# Patient Record
Sex: MALE | Race: WHITE | HISPANIC OR LATINO | Employment: FULL TIME | URBAN - METROPOLITAN AREA
[De-identification: names, ages, dates, MRNs, and addresses within clinical notes are randomized per-mention and may not be internally consistent; named-entity substitution may affect disease eponyms.]

---

## 2017-05-16 ENCOUNTER — HOSPITAL ENCOUNTER (OUTPATIENT)
Dept: LAB | Facility: MEDICAL CENTER | Age: 27
End: 2017-05-16
Attending: PHYSICIAN ASSISTANT
Payer: COMMERCIAL

## 2017-05-16 ENCOUNTER — NON-PROVIDER VISIT (OUTPATIENT)
Dept: CARDIOLOGY | Facility: MEDICAL CENTER | Age: 27
End: 2017-05-16
Payer: COMMERCIAL

## 2017-05-16 ENCOUNTER — OFFICE VISIT (OUTPATIENT)
Dept: MEDICAL GROUP | Facility: MEDICAL CENTER | Age: 27
End: 2017-05-16
Payer: COMMERCIAL

## 2017-05-16 VITALS
TEMPERATURE: 97.9 F | BODY MASS INDEX: 28.08 KG/M2 | SYSTOLIC BLOOD PRESSURE: 156 MMHG | WEIGHT: 189.6 LBS | DIASTOLIC BLOOD PRESSURE: 98 MMHG | HEART RATE: 78 BPM | RESPIRATION RATE: 16 BRPM | OXYGEN SATURATION: 98 % | HEIGHT: 69 IN

## 2017-05-16 DIAGNOSIS — R00.1 SINUS BRADYCARDIA: ICD-10-CM

## 2017-05-16 DIAGNOSIS — I10 ESSENTIAL HYPERTENSION: ICD-10-CM

## 2017-05-16 DIAGNOSIS — I49.3 PVC (PREMATURE VENTRICULAR CONTRACTION): ICD-10-CM

## 2017-05-16 DIAGNOSIS — Z13.6 SCREENING FOR CARDIOVASCULAR CONDITION: ICD-10-CM

## 2017-05-16 DIAGNOSIS — R00.2 PALPITATIONS: ICD-10-CM

## 2017-05-16 DIAGNOSIS — R00.0 SINUS TACHYCARDIA: ICD-10-CM

## 2017-05-16 DIAGNOSIS — F41.9 ANXIETY: Chronic | ICD-10-CM

## 2017-05-16 DIAGNOSIS — R00.2 PALPITATIONS: Primary | ICD-10-CM

## 2017-05-16 DIAGNOSIS — I49.1 PREMATURE ATRIAL COMPLEX: ICD-10-CM

## 2017-05-16 LAB
ALBUMIN SERPL BCP-MCNC: 4.8 G/DL (ref 3.2–4.9)
ALBUMIN/GLOB SERPL: 1.3 G/DL
ALP SERPL-CCNC: 85 U/L (ref 30–99)
ALT SERPL-CCNC: 53 U/L (ref 2–50)
AMORPH CRY #/AREA URNS HPF: PRESENT /HPF
ANION GAP SERPL CALC-SCNC: 8 MMOL/L (ref 0–11.9)
APPEARANCE UR: ABNORMAL
AST SERPL-CCNC: 28 U/L (ref 12–45)
BACTERIA #/AREA URNS HPF: ABNORMAL /HPF
BILIRUB SERPL-MCNC: 0.8 MG/DL (ref 0.1–1.5)
BILIRUB UR QL STRIP.AUTO: NEGATIVE
BUN SERPL-MCNC: 14 MG/DL (ref 8–22)
CALCIUM SERPL-MCNC: 10 MG/DL (ref 8.5–10.5)
CHLORIDE SERPL-SCNC: 101 MMOL/L (ref 96–112)
CHOLEST SERPL-MCNC: 229 MG/DL (ref 100–199)
CO2 SERPL-SCNC: 26 MMOL/L (ref 20–33)
COLOR UR: YELLOW
CREAT SERPL-MCNC: 0.88 MG/DL (ref 0.5–1.4)
CULTURE IF INDICATED INDCX: YES UA CULTURE
GFR SERPL CREATININE-BSD FRML MDRD: >60 ML/MIN/1.73 M 2
GLOBULIN SER CALC-MCNC: 3.7 G/DL (ref 1.9–3.5)
GLUCOSE SERPL-MCNC: 88 MG/DL (ref 65–99)
GLUCOSE UR STRIP.AUTO-MCNC: NEGATIVE MG/DL
HDLC SERPL-MCNC: 42 MG/DL
KETONES UR STRIP.AUTO-MCNC: NEGATIVE MG/DL
LDLC SERPL CALC-MCNC: ABNORMAL MG/DL
LEUKOCYTE ESTERASE UR QL STRIP.AUTO: NEGATIVE
MICRO URNS: ABNORMAL
MUCOUS THREADS #/AREA URNS HPF: ABNORMAL /HPF
NITRITE UR QL STRIP.AUTO: NEGATIVE
PH UR STRIP.AUTO: 6 [PH]
POTASSIUM SERPL-SCNC: 4 MMOL/L (ref 3.6–5.5)
PROT SERPL-MCNC: 8.5 G/DL (ref 6–8.2)
PROT UR QL STRIP: NEGATIVE MG/DL
RBC # URNS HPF: ABNORMAL /HPF
RBC UR QL AUTO: NEGATIVE
SODIUM SERPL-SCNC: 135 MMOL/L (ref 135–145)
SP GR UR STRIP.AUTO: 1.02
TRIGL SERPL-MCNC: 410 MG/DL (ref 0–149)
TSH SERPL DL<=0.005 MIU/L-ACNC: 2.29 UIU/ML (ref 0.3–3.7)

## 2017-05-16 PROCEDURE — 93000 ELECTROCARDIOGRAM COMPLETE: CPT | Performed by: PHYSICIAN ASSISTANT

## 2017-05-16 PROCEDURE — 99214 OFFICE O/P EST MOD 30 MIN: CPT | Performed by: PHYSICIAN ASSISTANT

## 2017-05-16 PROCEDURE — 84443 ASSAY THYROID STIM HORMONE: CPT

## 2017-05-16 PROCEDURE — 80061 LIPID PANEL: CPT

## 2017-05-16 PROCEDURE — 80053 COMPREHEN METABOLIC PANEL: CPT

## 2017-05-16 PROCEDURE — 36415 COLL VENOUS BLD VENIPUNCTURE: CPT

## 2017-05-16 PROCEDURE — 87086 URINE CULTURE/COLONY COUNT: CPT

## 2017-05-16 PROCEDURE — 81001 URINALYSIS AUTO W/SCOPE: CPT

## 2017-05-16 ASSESSMENT — PATIENT HEALTH QUESTIONNAIRE - PHQ9: CLINICAL INTERPRETATION OF PHQ2 SCORE: 0

## 2017-05-16 NOTE — ASSESSMENT & PLAN NOTE
Chronic in nature. Patient has been told over the years positive hypertension elevated blood pressure. Currently asymptomatic. Patient admits that he is not taking the meds as he was supposed to.

## 2017-05-16 NOTE — PROGRESS NOTES
Chief Complaint   Patient presents with   • New Patient       HISTORY OF PRESENT [id number]: Patient is a 26 y.o. male established patient who presents today to establish care and discuss palpitation    Palpitations  Chronic in nature. Patient states that he's had episodes over the years. Patient states that he recently had an episode of palpitations patient states that 2 days ago while trying to go to sleep patient states he exhibited a symptom and a run of palpitations. Patient states it lasted approximately 30 seconds to one minute in duration. Patient states that he is very worried about this. Patient states that he did call 911. EMS did arrive on scene. Patient states he was feeling better and therefore declined transport to the emergency room. Patient presents to clinic today for further evaluation. Patient has had issues in the past. He was given propanolol 80 mg once a day but admits he has not taken that medication currently. Patient states that he did have one episode of vomiting during this occurrence as well as sensation of passing out but admits no loss of consciousness.    Essential hypertension  Chronic in nature. Patient has been told over the years positive hypertension elevated blood pressure. Currently asymptomatic. Patient admits that he is not taking the meds as he was supposed to.    Anxiety  Chronic in nature. Patient has been told in the past positive anxiety. Patient feels that he is on edge on the time. He has been given meds in the past for this, but admits noncompliance       Patient Active Problem List    Diagnosis Date Noted   • Inappropriate sinus tachycardia (HCC) 08/08/2014     Priority: Medium   • Anxiety 08/08/2014     Priority: Low   • Palpitations 05/16/2017   • Diffuse abdominal pain 08/10/2016   • Diarrhea 08/10/2016   • Essential hypertension 08/05/2016   • Dyspepsia 08/05/2016       Allergies:Review of patient's allergies indicates no known allergies.    Current Outpatient  "Prescriptions   Medication Sig Dispense Refill   • omeprazole (PRILOSEC) 20 MG delayed-release capsule Take 1 Cap by mouth every day. 30 Cap 2     No current facility-administered medications for this visit.       Social History   Substance Use Topics   • Smoking status: Never Smoker    • Smokeless tobacco: Never Used   • Alcohol Use: 6.0 oz/week     10 Cans of beer per week       Family Status   Relation Status Death Age   • Mother Alive    • Father Alive      Family History   Problem Relation Age of Onset   • Hypertension Mother        Review of Systems:   Constitutional: Negative for fever, chills, weight loss and malaise/fatigue.   HENT: Negative for ear pain, nosebleeds, congestion, sore throat and neck pain.    Eyes: Negative for blurred vision.   Respiratory: Negative for cough, sputum production, shortness of breath and wheezing.    Cardiovascular: Negative for chest pain. Positive palpitations. Negative orthopnea and leg swelling.   Gastrointestinal: Negative for heartburn, nausea, vomiting and abdominal pain.   Genitourinary: Negative for dysuria, urgency and frequency.   Musculoskeletal: Negative for myalgias, back pain and joint pain.   Skin: Negative for rash and itching.   Neurological: Negative for dizziness, tingling, tremors, sensory change, focal weakness and headaches.   Endo/Heme/Allergies: Does not bruise/bleed easily.   Psychiatric/Behavioral: Negative for depression, suicidal ideas and memory loss.  The patient is nervous/anxious and does not have insomnia.    All other systems reviewed and are negative except as in HPI.    Exam:  Blood pressure 156/98, pulse 78, temperature 36.6 °C (97.9 °F), resp. rate 16, height 1.753 m (5' 9.02\"), weight 86 kg (189 lb 9.5 oz), SpO2 98 %.  General:  Well nourished, well developed male in NAD  Head: is grossly normal.  Neck: Supple without JVD or bruit. Thyroid is not enlarged.  Pulmonary: Clear to ausculation. Normal effort. No rales, ronchi, or " wheezing.  Cardiovascular: Regular rate and rhythm without murmur. Carotid and radial pulses are intact and equal bilaterally.  Extremities: no clubbing, cyanosis, or edema.  Psychiatric/behavioral: Positive anxiety. No signs of homicidal or suicidal ideations. No auditory, visual or tactile disease.    EKG Interpretation   Interpreted by me   Rhythm: normal sinus   Rate: normal 82 bpm  Conduction: normal   ST Segments: no acute change   T Waves: no acute change   Q Waves: none   Clinical Impression: no acute changes and normal EKG    Medical decision-making and discussion: 26-year-old male presents to clinic to establish care with us his primary care. Patient states positive history of palpitations with having a pretty severe episode recently. Did not go to ER. Labs haven't ordered to check overall status including thyroid, etc. EKG performed in office today please see above. Holter monitor also ordered to check long-term if patient is having any runs of cardiac abnormalities that may be causing patient's palpitation-like symptoms. Also discussed with patient possibility of anxiety as the underlying cause. Discussed also the patient does have hypertension. I have evaluated October 16, 2015, August 5, 2016 and today May 16, 2017 office visits in which blood pressure is 142/76, 178/110 and 156/98 mmHg respectively. Labs have been ordered to evaluate electrolyte imbalances, thyroid, etc. Reevaluation 2 weeks in which we will go over labs, and initiate hypertensive medication for patient. Also discussed possibility of starting anxiety medication which may secondarily decrease patient's high blood pressure and may decrease patient's symptomology of palpitations once therapeutic.    Please note that this dictation was created using voice recognition software. I have made every reasonable attempt to correct obvious errors, but I expect that there are errors of grammar and possibly content that I did not discover before  finalizing the note.    Assessment/Plan:  1. Palpitations  EKG    HOLTER MONITOR STUDY   2. Essential hypertension  COMP METABOLIC PANEL    TSH    URINALYSIS,CULTURE IF INDICATED    HOLTER MONITOR STUDY    Reevaluation 2 weeks, and medication to be written   3. Anxiety  COMP METABOLIC PANEL    TSH    URINALYSIS,CULTURE IF INDICATED   4. Screening for cardiovascular condition  LIPID PROFILE

## 2017-05-16 NOTE — ASSESSMENT & PLAN NOTE
Chronic in nature. Patient states that he's had episodes over the years. Patient states that he recently had an episode of palpitations patient states that 2 days ago while trying to go to sleep patient states he exhibited a symptom and a run of palpitations. Patient states it lasted approximately 30 seconds to one minute in duration. Patient states that he is very worried about this. Patient states that he did call 911. EMS did arrive on scene. Patient states he was feeling better and therefore declined transport to the emergency room. Patient presents to clinic today for further evaluation. Patient has had issues in the past. He was given propanolol 80 mg once a day but admits he has not taken that medication currently. Patient states that he did have one episode of vomiting during this occurrence as well as sensation of passing out but admits no loss of consciousness.

## 2017-05-16 NOTE — ASSESSMENT & PLAN NOTE
Chronic in nature. Patient has been told in the past positive anxiety. Patient feels that he is on edge on the time. He has been given meds in the past for this, but admits noncompliance

## 2017-05-18 ENCOUNTER — TELEPHONE (OUTPATIENT)
Dept: MEDICAL GROUP | Facility: MEDICAL CENTER | Age: 27
End: 2017-05-18

## 2017-05-18 LAB
BACTERIA UR CULT: NORMAL
SIGNIFICANT IND 70042: NORMAL
SOURCE SOURCE: NORMAL

## 2017-05-18 NOTE — TELEPHONE ENCOUNTER
Phone Number Called: [phone] (home)     Message: Left msg for patient that he needs to make f/v to discuss labs.    Left Message for patient to call back: N\A

## 2017-05-18 NOTE — TELEPHONE ENCOUNTER
----- Message from Genaro Fernández PA-C sent at 5/18/2017 11:15 AM PDT -----  Review of labs: Please have patient schedule follow-up to discuss

## 2017-05-19 NOTE — TELEPHONE ENCOUNTER
Phone Number Called: [phone] (home)     Message: left msg for pt to call back.     Left Message for patient to call back: N\A

## 2017-05-22 DIAGNOSIS — R00.2 PALPITATIONS: ICD-10-CM

## 2017-05-23 ENCOUNTER — OFFICE VISIT (OUTPATIENT)
Dept: MEDICAL GROUP | Facility: MEDICAL CENTER | Age: 27
End: 2017-05-23
Payer: COMMERCIAL

## 2017-05-23 VITALS
WEIGHT: 189 LBS | TEMPERATURE: 98.2 F | HEART RATE: 85 BPM | BODY MASS INDEX: 27.99 KG/M2 | HEIGHT: 69 IN | DIASTOLIC BLOOD PRESSURE: 90 MMHG | OXYGEN SATURATION: 96 % | RESPIRATION RATE: 16 BRPM | SYSTOLIC BLOOD PRESSURE: 144 MMHG

## 2017-05-23 DIAGNOSIS — E78.3 HYPERCHYLOMICRONEMIA: ICD-10-CM

## 2017-05-23 PROCEDURE — 99214 OFFICE O/P EST MOD 30 MIN: CPT | Performed by: PHYSICIAN ASSISTANT

## 2017-05-23 RX ORDER — FENOFIBRATE 145 MG/1
145 TABLET, COATED ORAL DAILY
Qty: 90 TAB | Refills: 1 | Status: SHIPPED | OUTPATIENT
Start: 2017-05-23 | End: 2017-06-12

## 2017-05-23 NOTE — PATIENT INSTRUCTIONS
"Food Choices to Lower Your Triglycerides  Triglycerides are a type of fat in your blood. High levels of triglycerides can increase the risk of heart disease and stroke. If your triglyceride levels are high, the foods you eat and your eating habits are very important. Choosing the right foods can help lower your triglycerides.   WHAT GENERAL GUIDELINES DO I NEED TO FOLLOW?  · Lose weight if you are overweight.    · Limit or avoid alcohol.    · Fill one half of your plate with vegetables and green salads.    · Limit fruit to two servings a day. Choose fruit instead of juice.    · Make one fourth of your plate whole grains. Look for the word \"whole\" as the first word in the ingredient list.  · Fill one fourth of your plate with lean protein foods.  · Enjoy fatty fish (such as salmon, mackerel, sardines, and tuna) three times a week.    · Choose healthy fats.    · Limit foods high in starch and sugar.  · Eat more home-cooked food and less restaurant, buffet, and fast food.  · Limit fried foods.  · Cook foods using methods other than frying.  · Limit saturated fats.  · Check ingredient lists to avoid foods with partially hydrogenated oils (trans fats) in them.  WHAT FOODS CAN I EAT?   Grains  Whole grains, such as whole wheat or whole grain breads, crackers, cereals, and pasta. Unsweetened oatmeal, bulgur, barley, quinoa, or brown rice. Corn or whole wheat flour tortillas.   Vegetables  Fresh or frozen vegetables (raw, steamed, roasted, or grilled). Green salads.  Fruits  All fresh, canned (in natural juice), or frozen fruits.  Meat and Other Protein Products  Ground beef (85% or leaner), grass-fed beef, or beef trimmed of fat. Skinless chicken or turkey. Ground chicken or turkey. Pork trimmed of fat. All fish and seafood. Eggs. Dried beans, peas, or lentils. Unsalted nuts or seeds. Unsalted canned or dry beans.  Dairy  Low-fat dairy products, such as skim or 1% milk, 2% or reduced-fat cheeses, low-fat ricotta or cottage " cheese, or plain low-fat yogurt.  Fats and Oils  Tub margarines without trans fats. Light or reduced-fat mayonnaise and salad dressings. Avocado. Safflower, olive, or canola oils. Natural peanut or almond butter.  The items listed above may not be a complete list of recommended foods or beverages. Contact your dietitian for more options.  WHAT FOODS ARE NOT RECOMMENDED?   Grains  White bread. White pasta. White rice. Cornbread. Bagels, pastries, and croissants. Crackers that contain trans fat.  Vegetables  White potatoes. Corn. Creamed or fried vegetables. Vegetables in a cheese sauce.  Fruits  Dried fruits. Canned fruit in light or heavy syrup. Fruit juice.  Meat and Other Protein Products  Fatty cuts of meat. Ribs, chicken wings, agosto, sausage, bologna, salami, chitterlings, fatback, hot dogs, bratwurst, and packaged luncheon meats.  Dairy  Whole or 2% milk, cream, half-and-half, and cream cheese. Whole-fat or sweetened yogurt. Full-fat cheeses. Nondairy creamers and whipped toppings. Processed cheese, cheese spreads, or cheese curds.  Sweets and Desserts  Corn syrup, sugars, honey, and molasses. Candy. Jam and jelly. Syrup. Sweetened cereals. Cookies, pies, cakes, donuts, muffins, and ice cream.  Fats and Oils  Butter, stick margarine, lard, shortening, ghee, or agosto fat. Coconut, palm kernel, or palm oils.  Beverages  Alcohol. Sweetened drinks (such as sodas, lemonade, and fruit drinks or punches).  The items listed above may not be a complete list of foods and beverages to avoid. Contact your dietitian for more information.     This information is not intended to replace advice given to you by your health care provider. Make sure you discuss any questions you have with your health care provider.     Document Released: 10/05/2005 Document Revised: 01/08/2016 Document Reviewed: 10/22/2014  [employer] Interactive Patient Education ©2016 [employer]

## 2017-05-23 NOTE — MR AVS SNAPSHOT
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]         Vital Signs     Blood Pressure Pulse Temperature Respirations Height Weight    144/90 mmHg 85 36.8 °C (98.2 °F) 16 1.753 m (5' 9.02\") 85.73 kg (189 lb)    Body Mass Index Oxygen Saturation Smoking Status             27.90 kg/m2 96% Never Smoker          Basic Information     Date Of Birth Sex Race Ethnicity Preferred Language    1990 Male White  Origin (Burmese,Portuguese,Cymraes,Malian, etc) English      Problem List              ICD-10-CM Priority Class Noted - Resolved    Inappropriate sinus tachycardia (HCC) (Chronic) R00.0 Medium  2014 - Present    Anxiety (Chronic) F41.9 Low  2014 - Present    Essential hypertension I10   2016 - Present    Dyspepsia R10.13   2016 - Present    Diffuse abdominal pain R10.84   8/10/2016 - Present    Diarrhea R19.7   8/10/2016 - Present    Palpitations R00.2   2017 - Present      Health Maintenance        Date Due Completion Dates    IMM DTaP/Tdap/Td Vaccine (5 - Tdap) 2004 2/3/2004, 1996, 1992, 1992, 1992    IMM HPV VACCINE (2 of 3 - Male 3 Dose Series) 2015 3/30/2015 (Declined)    Override on 3/30/2015: Patient Declined            Current Immunizations     Dtap Vaccine 1996, 1992, 1992, 1992    Hepatitis A Vaccine, Ped/Adol 2/3/2004, 2003    Hepatitis B Vaccine Non-Recombivax (Ped/Adol) 2003, 2003, 2002    TD Vaccine 2/3/2004    Varicella Vaccine Live 2007, 2003      Below and/or attached are the medications your provider expects you to take. Review all of your home medications and " newly ordered medications with your provider and/or pharmacist. Follow medication instructions as directed by your provider and/or pharmacist. Please keep your medication list with you and share with your provider. Update the information when medications are discontinued, doses are changed, or new medications (including over-the-counter products) are added; and carry medication information at all times in the event of emergency situations     Allergies:  No Known Allergies          Medications  Valid as of: May 23, 2017 -  9:55 AM    Generic Name Brand Name Tablet Size Instructions for use    Fenofibrate (Tab) TRICOR 145 MG Take 1 Tab by mouth every day.        Omeprazole (CAPSULE DELAYED RELEASE) PRILOSEC 20 MG Take 1 Cap by mouth every day.        .                 Medicines prescribed today were sent to:     [id number] - [place], [place] - [address]    [facility] [place] [address]    Phone: [phone] Fax: [phone]    Open 24 Hours?: No      Medication refill instructions:       If your prescription bottle indicates you have medication refills left, it is not necessary to call your provider’s office. Please contact your pharmacy and they will refill your medication.    If your prescription bottle indicates you do not have any refills left, you may request refills at any time through one of the following ways: The online [employer] system (except Urgent Care), by calling your provider’s office, or by asking your pharmacy to contact your provider’s office with a refill request. Medication refills are processed only during regular business hours and may not be available until the next business day. Your provider may request additional information or to have a follow-up visit with you prior to refilling your medication.   *Please Note: Medication refills are assigned a new Rx number when refilled electronically. Your pharmacy may indicate that no refills were authorized even though a new prescription for the  "same medication is available at the pharmacy. Please request the medicine by name with the pharmacy before contacting your provider for a refill.        Instructions    Food Choices to Lower Your Triglycerides  Triglycerides are a type of fat in your blood. High levels of triglycerides can increase the risk of heart disease and stroke. If your triglyceride levels are high, the foods you eat and your eating habits are very important. Choosing the right foods can help lower your triglycerides.   WHAT GENERAL GUIDELINES DO I NEED TO FOLLOW?  · Lose weight if you are overweight.    · Limit or avoid alcohol.    · Fill one half of your plate with vegetables and green salads.    · Limit fruit to two servings a day. Choose fruit instead of juice.    · Make one fourth of your plate whole grains. Look for the word \"whole\" as the first word in the ingredient list.  · Fill one fourth of your plate with lean protein foods.  · Enjoy fatty fish (such as salmon, mackerel, sardines, and tuna) three times a week.    · Choose healthy fats.    · Limit foods high in starch and sugar.  · Eat more home-cooked food and less restaurant, buffet, and fast food.  · Limit fried foods.  · Cook foods using methods other than frying.  · Limit saturated fats.  · Check ingredient lists to avoid foods with partially hydrogenated oils (trans fats) in them.  WHAT FOODS CAN I EAT?   Grains  Whole grains, such as whole wheat or whole grain breads, crackers, cereals, and pasta. Unsweetened oatmeal, bulgur, barley, quinoa, or brown rice. Corn or whole wheat flour tortillas.   Vegetables  Fresh or frozen vegetables (raw, steamed, roasted, or grilled). Green salads.  Fruits  All fresh, canned (in natural juice), or frozen fruits.  Meat and Other Protein Products  Ground beef (85% or leaner), grass-fed beef, or beef trimmed of fat. Skinless chicken or turkey. Ground chicken or turkey. Pork trimmed of fat. All fish and seafood. Eggs. Dried beans, peas, or " lentils. Unsalted nuts or seeds. Unsalted canned or dry beans.  Dairy  Low-fat dairy products, such as skim or 1% milk, 2% or reduced-fat cheeses, low-fat ricotta or cottage cheese, or plain low-fat yogurt.  Fats and Oils  Tub margarines without trans fats. Light or reduced-fat mayonnaise and salad dressings. Avocado. Safflower, olive, or canola oils. Natural peanut or almond butter.  The items listed above may not be a complete list of recommended foods or beverages. Contact your dietitian for more options.  WHAT FOODS ARE NOT RECOMMENDED?   Grains  White bread. White pasta. White rice. Cornbread. Bagels, pastries, and croissants. Crackers that contain trans fat.  Vegetables  White potatoes. Corn. Creamed or fried vegetables. Vegetables in a cheese sauce.  Fruits  Dried fruits. Canned fruit in light or heavy syrup. Fruit juice.  Meat and Other Protein Products  Fatty cuts of meat. Ribs, chicken wings, agosto, sausage, bologna, salami, chitterlings, fatback, hot dogs, bratwurst, and packaged luncheon meats.  Dairy  Whole or 2% milk, cream, half-and-half, and cream cheese. Whole-fat or sweetened yogurt. Full-fat cheeses. Nondairy creamers and whipped toppings. Processed cheese, cheese spreads, or cheese curds.  Sweets and Desserts  Corn syrup, sugars, honey, and molasses. Candy. Jam and jelly. Syrup. Sweetened cereals. Cookies, pies, cakes, donuts, muffins, and ice cream.  Fats and Oils  Butter, stick margarine, lard, shortening, ghee, or agosto fat. Coconut, palm kernel, or palm oils.  Beverages  Alcohol. Sweetened drinks (such as sodas, lemonade, and fruit drinks or punches).  The items listed above may not be a complete list of foods and beverages to avoid. Contact your dietitian for more information.     This information is not intended to replace advice given to you by your health care provider. Make sure you discuss any questions you have with your health care provider.     Document Released: 10/05/2005 Document  Revised: 01/08/2016 Document Reviewed: 10/22/2014  [employer] Interactive Patient Education ©2016 [employer]            MyChart Access Code: Activation code not generated  Current MyChart Status: Active

## 2017-05-23 NOTE — PROGRESS NOTES
Chief Complaint   Patient presents with   • Follow-Up       HISTORY OF PRESENT ILLNESS: Patient is a 26 y.o. male established patient who presents today to follow up.    Patient presented to clinic as a new patient on May 16, 2017. Labs were ordered and performed on that same day. Patient presents to clinic today to follow up on those labs. Patient is orally reviewed labs through MyChart is aware of the abnormalities. In discussion with patient he does state that diet could be better, lifestyle could be better. Patient states there are a lot of cultural barriers that prevent patient from eating that healthiest diet.    No problem-specific assessment & plan notes found for this encounter.     Patient Active Problem List    Diagnosis Date Noted   • Inappropriate sinus tachycardia (HCC) 08/08/2014     Priority: Medium   • Anxiety 08/08/2014     Priority: Low   • Palpitations 05/16/2017   • Diffuse abdominal pain 08/10/2016   • Diarrhea 08/10/2016   • Essential hypertension 08/05/2016   • Dyspepsia 08/05/2016     Allergies:Review of patient's allergies indicates no known allergies.    Current Outpatient Prescriptions   Medication Sig Dispense Refill   • omeprazole (PRILOSEC) 20 MG delayed-release capsule Take 1 Cap by mouth every day. 30 Cap 2     No current facility-administered medications for this visit.       Social History   Substance Use Topics   • Smoking status: Never Smoker    • Smokeless tobacco: Never Used   • Alcohol Use: 6.0 oz/week     10 Cans of beer per week     Family Status   Relation Status Death Age   • Mother Alive    • Father Alive      Family History   Problem Relation Age of Onset   • Hypertension Mother      Review of Systems:   Constitutional: Negative for fever, chills, weight loss and malaise/fatigue.   HENT: Negative for ear pain, nosebleeds, congestion, sore throat and neck pain.    Eyes: Negative for blurred vision.   Respiratory: Negative for cough, sputum production, shortness of breath  "and wheezing.    Cardiovascular: Negative for chest pain, palpitations, orthopnea and leg swelling.   Gastrointestinal: Negative for heartburn, nausea, vomiting and abdominal pain.   Genitourinary: Negative for dysuria, urgency and frequency.   Musculoskeletal: Negative for myalgias, back pain and joint pain.   Skin: Negative for rash and itching.   Neurological: Negative for dizziness, tingling, tremors, sensory change, focal weakness and headaches.   Endo/Heme/Allergies: Does not bruise/bleed easily.   Psychiatric/Behavioral: Negative for depression, suicidal ideas and memory loss.  The patient is not nervous/anxious and does not have insomnia.    All other systems reviewed and are negative except as in HPI.    Exam:  Blood pressure 144/90, pulse 85, temperature 36.8 °C (98.2 °F), resp. rate 16, height 1.753 m (5' 9.02\"), weight 85.73 kg (189 lb), SpO2 96 %.  General:  Well nourished, well developed male in NAD  Head: is grossly normal.  Neck: Supple without JVD or bruit. Thyroid is not enlarged.  Pulmonary: Clear to ausculation. Normal effort. No rales, ronchi, or wheezing.  Cardiovascular: Regular rate and rhythm without murmur. Carotid and radial pulses are intact and equal bilaterally.  Extremities: no clubbing, cyanosis, or edema.    LABS: 05/16/17: Results reviewed and discussed with the patient, questions answered., Negative blood. GFR greater than 60. TSH 2.290. Lipid panel: Total cholesterol 229, triglycerides 410, HDL 42    Medical decision-making and discussion: A 26-year-old male presents to clinic today to follow up on labs. Abnormalities include significantly elevated triglycerides with elevated total cholesterol. Discussed with patient various dietary choices, lysed all modification. Also discussed alcohol cessation as it may be contributing, all of them may be contributing to elevated triglycerides and subsequently elevated total cholesterol. In the interim I'll patient is attempting to go through " lifestyle changes, alcohol stopping, etc. we are going to place patient on triglyceride lowering medication. Recheck cholesterol in 3 months.    Please note that this dictation was created using voice recognition software. I have made every reasonable attempt to correct obvious errors, but I expect that there are errors of grammar and possibly content that I did not discover before finalizing the note.    Assessment/Plan:  No diagnosis found.

## 2017-05-25 DIAGNOSIS — R00.2 PALPITATIONS: ICD-10-CM

## 2017-05-25 DIAGNOSIS — I10 ESSENTIAL HYPERTENSION: ICD-10-CM

## 2017-05-31 LAB — EKG IMPRESSION: NORMAL

## 2017-05-31 PROCEDURE — 93224 XTRNL ECG REC UP TO 48 HRS: CPT | Performed by: INTERNAL MEDICINE

## 2017-06-01 ENCOUNTER — TELEPHONE (OUTPATIENT)
Dept: MEDICAL GROUP | Facility: MEDICAL CENTER | Age: 27
End: 2017-06-01

## 2017-06-01 NOTE — TELEPHONE ENCOUNTER
----- Message from Genaro Fernández PA-C sent at 6/1/2017  9:32 AM PDT -----  Review of Holter monitor shows no heart block or any abnormalities. On rare occasion there is an abnormal heart beat, nothing that cardiology seems to be concerned about

## 2017-06-01 NOTE — TELEPHONE ENCOUNTER
Phone Number Called: [phone] (home)     Message: Left message for patient to call back.    Left Message for patient to call back: Yes

## 2017-06-02 NOTE — TELEPHONE ENCOUNTER
Phone Number Called: [phone] (home)     Message: Patient informed of results.     Left Message for patient to call back: N\A

## 2017-06-12 ENCOUNTER — OFFICE VISIT (OUTPATIENT)
Dept: MEDICAL GROUP | Facility: MEDICAL CENTER | Age: 27
End: 2017-06-12
Payer: COMMERCIAL

## 2017-06-12 VITALS
HEIGHT: 69 IN | DIASTOLIC BLOOD PRESSURE: 88 MMHG | TEMPERATURE: 98.8 F | BODY MASS INDEX: 27.1 KG/M2 | HEART RATE: 76 BPM | RESPIRATION RATE: 16 BRPM | SYSTOLIC BLOOD PRESSURE: 120 MMHG | WEIGHT: 182.98 LBS | OXYGEN SATURATION: 96 %

## 2017-06-12 DIAGNOSIS — F41.9 ANXIETY: Chronic | ICD-10-CM

## 2017-06-12 DIAGNOSIS — R00.2 PALPITATIONS: ICD-10-CM

## 2017-06-12 DIAGNOSIS — E78.2 MIXED HYPERLIPIDEMIA: ICD-10-CM

## 2017-06-12 PROCEDURE — 99214 OFFICE O/P EST MOD 30 MIN: CPT | Performed by: PHYSICIAN ASSISTANT

## 2017-06-12 RX ORDER — ESCITALOPRAM OXALATE 5 MG/1
5 TABLET ORAL DAILY
Qty: 30 TAB | Refills: 1 | Status: SHIPPED | OUTPATIENT
Start: 2017-06-12 | End: 2017-06-19 | Stop reason: SINTOL

## 2017-06-12 NOTE — ASSESSMENT & PLAN NOTE
Noted on previous labs positive elevated triglyceride and total cholesterol. Patient was started on fenofibrate 145 mg. Due to cost and insurance we had discussed with pharmacy placing patient on a lower dose at their request. Patient admits that he has been contacting pharmacy and is having difficulty getting this medication. Patient states since being seen in less than one month ago he has tried diet and lifestyle modification. Patient states he is losing weight. In discussion with patient we are going to hold off medicinal management and taking pills for the next 3-6 months while patient continues to eat healthier, exercise, lose weight. We will recheck labs in 3-6 months, and it still elevated we will then consider low-dose medication to help with cholesterol.

## 2017-06-12 NOTE — MR AVS SNAPSHOT
"        Geremias Dg   2017 9:00 AM   Office Visit   MRN: [id number]    Department:  [place] [address]   Dept Phone:  [phone]    Description:  Male : 1990   Provider:  Genaro Fernández PA-C           Reason for Visit     Follow-Up           Allergies as of 2017     No Known Allergies      You were diagnosed with     Mixed hyperlipidemia   [272.2.ICD-9-CM]       Palpitations   [785.1.ICD-9-CM]       Anxiety   [703145]         Vital Signs     Blood Pressure Pulse Temperature Respirations Height Weight    120/88 mmHg 76 37.1 °C (98.8 °F) 16 1.753 m (5' 9.02\") 83 kg (182 lb 15.7 oz)    Body Mass Index Oxygen Saturation Smoking Status             27.01 kg/m2 96% Never Smoker          Basic Information     Date Of Birth Sex Race Ethnicity Preferred Language    1990 Male White  Origin (French,[place]ayan,[place]ian,[place]ymra[place], etc) English      Your appointments     2017  9:00 AM   Established Patient with Genaro Fernández PA-C   [facility]    [address]  [address]  [place] [id number] [phone]           You will be receiving a confirmation call a few days before your appointment from our automated call confirmation system.              Problem List              ICD-10-CM Priority Class Noted - Resolved    Inappropriate sinus tachycardia (HCC) (Chronic) R00.0 Medium  2014 - Present    Anxiety (Chronic) F41.9 Low  2014 - Present    Essential hypertension I10   2016 - Present    Dyspepsia R10.13   2016 - Present    Diffuse abdominal pain R10.84   8/10/2016 - Present    Diarrhea R19.7   8/10/2016 - Present    Palpitations R00.2   2017 - Present    Mixed hyperlipidemia E78.2   2017 - Present      Health Maintenance        Date Due Completion Dates    IMM DTaP/Tdap/Td Vaccine (5 - Tdap) 2004 2/3/2004, 1996, 1992, 1992, 1992    IMM HPV VACCINE (2 of 3 - Male 3 Dose Series) " 5/25/2015 3/30/2015 (Declined)    Override on 3/30/2015: Patient Declined            Current Immunizations     Dtap Vaccine 2/20/1996, 6/17/1992, 4/17/1992, 2/19/1992    Hepatitis A Vaccine, Ped/Adol 2/3/2004, 8/1/2003    Hepatitis B Vaccine Non-Recombivax (Ped/Adol) 8/1/2003, 5/29/2003, 1/26/2002    TD Vaccine 2/3/2004    Varicella Vaccine Live 1/29/2007, 5/29/2003      Below and/or attached are the medications your provider expects you to take. Review all of your home medications and newly ordered medications with your provider and/or pharmacist. Follow medication instructions as directed by your provider and/or pharmacist. Please keep your medication list with you and share with your provider. Update the information when medications are discontinued, doses are changed, or new medications (including over-the-counter products) are added; and carry medication information at all times in the event of emergency situations     Allergies:  No Known Allergies          Medications  Valid as of: June 12, 2017 -  9:59 AM    Generic Name Brand Name Tablet Size Instructions for use    Escitalopram Oxalate (Tab) LEXAPRO 5 MG Take 1 Tab by mouth every day.        Omeprazole (CAPSULE DELAYED RELEASE) PRILOSEC 20 MG Take 1 Cap by mouth every day.        .                 Medicines prescribed today were sent to:     [facility] [address] [facility], [place] - [address] [address]    Phone: [phone] Fax: [phone]    Open 24 Hours?: No      Medication refill instructions:       If your prescription bottle indicates you have medication refills left, it is not necessary to call your provider’s office. Please contact your pharmacy and they will refill your medication.    If your prescription bottle indicates you do not have any refills left, you may request refills at any time through one of the following ways: The online PharmAssistant system (except Urgent Care), by calling your provider’s office, or by asking your  pharmacy to contact your provider’s office with a refill request. Medication refills are processed only during regular business hours and may not be available until the next business day. Your provider may request additional information or to have a follow-up visit with you prior to refilling your medication.   *Please Note: Medication refills are assigned a new Rx number when refilled electronically. Your pharmacy may indicate that no refills were authorized even though a new prescription for the same medication is available at the pharmacy. Please request the medicine by name with the pharmacy before contacting your provider for a refill.           ImageTaghart Access Code: Activation code not generated  Current [employer] Status: Active

## 2017-06-12 NOTE — PROGRESS NOTES
Chief Complaint   Patient presents with   • Follow-Up       HISTORY OF PRESENT ILLNESS: Patient is a 26 y.o. male established patient who presents today to discuss anxiety and cholesterol    Mixed hyperlipidemia  Noted on previous labs positive elevated triglyceride and total cholesterol. Patient was started on fenofibrate 145 mg. Due to cost and insurance we had discussed with pharmacy placing patient on a lower dose at their request. Patient admits that he has been contacting pharmacy and is having difficulty getting this medication. Patient states since being seen in less than one month ago he has tried diet and lifestyle modification. Patient states he is losing weight. In discussion with patient we are going to hold off medicinal management and taking pills for the next 3-6 months while patient continues to eat healthier, exercise, lose weight. We will recheck labs in 3-6 months, and it still elevated we will then consider low-dose medication to help with cholesterol.    Palpitations  Chronic in nature. Patient has had episodes for years. Patient states that he's had palpitations. We did order echocardiogram for patient. He recently had performed did not show any abnormalities that was concerning to cardiology. Patient states he still has issues. Patient states this possibly may be anxiety derived. Patient has been worked up for this in the past. He was also given prescription for propranolol 80 mg. In discussion with patient previously had discussed possible medicinal intervention. After long consideration patient states he is now amendable to medicinal management and would like to be placed on medication for anxiety       Patient Active Problem List    Diagnosis Date Noted   • Inappropriate sinus tachycardia (HCC) 08/08/2014     Priority: Medium   • Anxiety 08/08/2014     Priority: Low   • Mixed hyperlipidemia 06/12/2017   • Palpitations 05/16/2017   • Diffuse abdominal pain 08/10/2016   • Diarrhea 08/10/2016  "  • Essential hypertension 08/05/2016   • Dyspepsia 08/05/2016       Allergies:Review of patient's allergies indicates no known allergies.    Current Outpatient Prescriptions   Medication Sig Dispense Refill   • escitalopram (LEXAPRO) 5 MG tablet Take 1 Tab by mouth every day. 30 Tab 1   • omeprazole (PRILOSEC) 20 MG delayed-release capsule Take 1 Cap by mouth every day. 30 Cap 2     No current facility-administered medications for this visit.       Social History   Substance Use Topics   • Smoking status: Never Smoker    • Smokeless tobacco: Never Used   • Alcohol Use: 6.0 oz/week     10 Cans of beer per week      Comment: weekends       Family Status   Relation Status Death Age   • Mother Alive    • Father Alive      Family History   Problem Relation Age of Onset   • Hypertension Mother        Review of Systems:   Constitutional: Negative for fever, chills, weight loss and malaise/fatigue.   HENT: Negative for ear pain, nosebleeds, congestion, sore throat and neck pain.    Eyes: Negative for blurred vision.   Respiratory: Negative for cough, sputum production, shortness of breath and wheezing.    Cardiovascular: Negative for chest pain, palpitations, orthopnea and leg swelling.   Gastrointestinal: Negative for heartburn, nausea, vomiting and abdominal pain.   Genitourinary: Negative for dysuria, urgency and frequency.   Musculoskeletal: Negative for myalgias, back pain and joint pain.   Skin: Negative for rash and itching.   Neurological: Negative for dizziness, tingling, tremors, sensory change, focal weakness and headaches.   Endo/Heme/Allergies: Does not bruise/bleed easily.   Psychiatric/Behavioral: Negative for depression, suicidal ideas and memory loss.  The patient is nervous/anxious and does not have insomnia.    All other systems reviewed and are negative except as in HPI.    Exam:  Blood pressure 120/88, pulse 76, temperature 37.1 °C (98.8 °F), resp. rate 16, height 1.753 m (5' 9.02\"), weight 83 kg (182 " lb 15.7 oz), SpO2 96 %.  General:  Well nourished, well developed male in NAD  Head: is grossly normal.  Neck: Supple without JVD or bruit. Thyroid is not enlarged.  Pulmonary: Clear to ausculation. Normal effort. No rales, ronchi, or wheezing.  Cardiovascular: Regular rate and rhythm without murmur. Carotid and radial pulses are intact and equal bilaterally.  Extremities: no clubbing, cyanosis, or edema.    Medical decision-making and discussion: 26-year-old male presents to clinic today to follow-up. We have discussed cholesterol patient will try eating healthier, exercising, losing weight. We also did discuss reducing the amount of alcohol patient is consuming as it may be contributed to patient's elevated triglyceride and total cholesterol levels. With regards to palpitations and anxiety we are going to try Lexapro 5 mg once a day for the next 2 weeks with reevaluation at such time. ER precautions have been given to patient. Discussed risks and benefits, side effects, etc. about medication.    Please note that this dictation was created using voice recognition software. I have made every reasonable attempt to correct obvious errors, but I expect that there are errors of grammar and possibly content that I did not discover before finalizing the note.    Assessment/Plan:  1. Mixed hyperlipidemia     2. Palpitations  escitalopram (LEXAPRO) 5 MG tablet   3. Anxiety  escitalopram (LEXAPRO) 5 MG tablet

## 2017-06-12 NOTE — ASSESSMENT & PLAN NOTE
Chronic in nature. Patient has had episodes for years. Patient states that he's had palpitations. We did order echocardiogram for patient. He recently had performed did not show any abnormalities that was concerning to cardiology. Patient states he still has issues. Patient states this possibly may be anxiety derived. Patient has been worked up for this in the past. He was also given prescription for propranolol 80 mg. In discussion with patient previously had discussed possible medicinal intervention. After long consideration patient states he is now amendable to medicinal management and would like to be placed on medication for anxiety

## 2017-06-19 ENCOUNTER — HOSPITAL ENCOUNTER (EMERGENCY)
Facility: MEDICAL CENTER | Age: 27
End: 2017-06-19
Attending: EMERGENCY MEDICINE
Payer: COMMERCIAL

## 2017-06-19 ENCOUNTER — OFFICE VISIT (OUTPATIENT)
Dept: MEDICAL GROUP | Facility: MEDICAL CENTER | Age: 27
End: 2017-06-19
Payer: COMMERCIAL

## 2017-06-19 ENCOUNTER — APPOINTMENT (OUTPATIENT)
Dept: RADIOLOGY | Facility: MEDICAL CENTER | Age: 27
End: 2017-06-19
Attending: EMERGENCY MEDICINE
Payer: COMMERCIAL

## 2017-06-19 VITALS
HEART RATE: 88 BPM | BODY MASS INDEX: 26.63 KG/M2 | TEMPERATURE: 99 F | RESPIRATION RATE: 16 BRPM | WEIGHT: 179.8 LBS | HEIGHT: 69 IN | OXYGEN SATURATION: 98 % | DIASTOLIC BLOOD PRESSURE: 88 MMHG | SYSTOLIC BLOOD PRESSURE: 122 MMHG

## 2017-06-19 VITALS
WEIGHT: 180.78 LBS | OXYGEN SATURATION: 98 % | HEART RATE: 78 BPM | DIASTOLIC BLOOD PRESSURE: 99 MMHG | SYSTOLIC BLOOD PRESSURE: 151 MMHG | RESPIRATION RATE: 14 BRPM | TEMPERATURE: 97.5 F | BODY MASS INDEX: 26.68 KG/M2

## 2017-06-19 DIAGNOSIS — F41.9 ANXIETY: ICD-10-CM

## 2017-06-19 DIAGNOSIS — R00.2 PALPITATIONS: ICD-10-CM

## 2017-06-19 DIAGNOSIS — F41.9 ANXIETY: Chronic | ICD-10-CM

## 2017-06-19 LAB
ALBUMIN SERPL BCP-MCNC: 4.6 G/DL (ref 3.2–4.9)
ALBUMIN/GLOB SERPL: 1.4 G/DL
ALP SERPL-CCNC: 83 U/L (ref 30–99)
ALT SERPL-CCNC: 26 U/L (ref 2–50)
ANION GAP SERPL CALC-SCNC: 9 MMOL/L (ref 0–11.9)
AST SERPL-CCNC: 17 U/L (ref 12–45)
BASOPHILS # BLD AUTO: 0.5 % (ref 0–1.8)
BASOPHILS # BLD: 0.04 K/UL (ref 0–0.12)
BILIRUB SERPL-MCNC: 0.6 MG/DL (ref 0.1–1.5)
BUN SERPL-MCNC: 17 MG/DL (ref 8–22)
CALCIUM SERPL-MCNC: 9.7 MG/DL (ref 8.5–10.5)
CHLORIDE SERPL-SCNC: 108 MMOL/L (ref 96–112)
CO2 SERPL-SCNC: 21 MMOL/L (ref 20–33)
CREAT SERPL-MCNC: 0.88 MG/DL (ref 0.5–1.4)
EKG IMPRESSION: NORMAL
EOSINOPHIL # BLD AUTO: 0.19 K/UL (ref 0–0.51)
EOSINOPHIL NFR BLD: 2.5 % (ref 0–6.9)
ERYTHROCYTE [DISTWIDTH] IN BLOOD BY AUTOMATED COUNT: 37.2 FL (ref 35.9–50)
GFR SERPL CREATININE-BSD FRML MDRD: >60 ML/MIN/1.73 M 2
GLOBULIN SER CALC-MCNC: 3.4 G/DL (ref 1.9–3.5)
GLUCOSE SERPL-MCNC: 109 MG/DL (ref 65–99)
HCT VFR BLD AUTO: 45.5 % (ref 42–52)
HGB BLD-MCNC: 16.2 G/DL (ref 14–18)
IMM GRANULOCYTES # BLD AUTO: 0.03 K/UL (ref 0–0.11)
IMM GRANULOCYTES NFR BLD AUTO: 0.4 % (ref 0–0.9)
LIPASE SERPL-CCNC: 18 U/L (ref 11–82)
LYMPHOCYTES # BLD AUTO: 1.74 K/UL (ref 1–4.8)
LYMPHOCYTES NFR BLD: 23.2 % (ref 22–41)
MCH RBC QN AUTO: 31.2 PG (ref 27–33)
MCHC RBC AUTO-ENTMCNC: 35.6 G/DL (ref 33.7–35.3)
MCV RBC AUTO: 87.7 FL (ref 81.4–97.8)
MONOCYTES # BLD AUTO: 0.72 K/UL (ref 0–0.85)
MONOCYTES NFR BLD AUTO: 9.6 % (ref 0–13.4)
NEUTROPHILS # BLD AUTO: 4.78 K/UL (ref 1.82–7.42)
NEUTROPHILS NFR BLD: 63.8 % (ref 44–72)
NRBC # BLD AUTO: 0 K/UL
NRBC BLD AUTO-RTO: 0 /100 WBC
PLATELET # BLD AUTO: 247 K/UL (ref 164–446)
PMV BLD AUTO: 10.3 FL (ref 9–12.9)
POTASSIUM SERPL-SCNC: 3.6 MMOL/L (ref 3.6–5.5)
PROT SERPL-MCNC: 8 G/DL (ref 6–8.2)
RBC # BLD AUTO: 5.19 M/UL (ref 4.7–6.1)
SODIUM SERPL-SCNC: 138 MMOL/L (ref 135–145)
TROPONIN I SERPL-MCNC: <0.01 NG/ML (ref 0–0.04)
WBC # BLD AUTO: 7.5 K/UL (ref 4.8–10.8)

## 2017-06-19 PROCEDURE — 84484 ASSAY OF TROPONIN QUANT: CPT

## 2017-06-19 PROCEDURE — 80053 COMPREHEN METABOLIC PANEL: CPT

## 2017-06-19 PROCEDURE — 700111 HCHG RX REV CODE 636 W/ 250 OVERRIDE (IP): Performed by: EMERGENCY MEDICINE

## 2017-06-19 PROCEDURE — 71010 DX-CHEST-PORTABLE (1 VIEW): CPT

## 2017-06-19 PROCEDURE — 96374 THER/PROPH/DIAG INJ IV PUSH: CPT

## 2017-06-19 PROCEDURE — 99285 EMERGENCY DEPT VISIT HI MDM: CPT

## 2017-06-19 PROCEDURE — 93005 ELECTROCARDIOGRAM TRACING: CPT | Performed by: EMERGENCY MEDICINE

## 2017-06-19 PROCEDURE — 83690 ASSAY OF LIPASE: CPT

## 2017-06-19 PROCEDURE — 85025 COMPLETE CBC W/AUTO DIFF WBC: CPT

## 2017-06-19 PROCEDURE — 99214 OFFICE O/P EST MOD 30 MIN: CPT | Performed by: PHYSICIAN ASSISTANT

## 2017-06-19 RX ORDER — LORAZEPAM 0.5 MG/1
0.5 TABLET ORAL EVERY 4 HOURS PRN
Qty: 10 TAB | Refills: 0 | Status: SHIPPED | OUTPATIENT
Start: 2017-06-19 | End: 2017-06-23

## 2017-06-19 RX ORDER — LORAZEPAM 2 MG/ML
0.5 INJECTION INTRAMUSCULAR ONCE
Status: COMPLETED | OUTPATIENT
Start: 2017-06-19 | End: 2017-06-19

## 2017-06-19 RX ORDER — CITALOPRAM HYDROBROMIDE 10 MG/1
10 TABLET ORAL DAILY
Qty: 30 TAB | Refills: 1 | Status: SHIPPED | OUTPATIENT
Start: 2017-06-19 | End: 2017-07-31

## 2017-06-19 RX ADMIN — LORAZEPAM 0.5 MG: 2 INJECTION INTRAMUSCULAR; INTRAVENOUS at 04:42

## 2017-06-19 ASSESSMENT — PAIN SCALES - GENERAL: PAINLEVEL_OUTOF10: 5

## 2017-06-19 NOTE — ED AVS SNAPSHOT
Home Care Instructions                                                                                                                Geremias Conte   MRN: [id number]    Department:  [facility], Emergency Dept   Date of Visit:  6/19/2017            [facility], Emergency Dept    [address]    [facility] [address]    Phone:  [phone]      You were seen by     Marky Medeiros M.D.      Your Diagnosis Was     Anxiety     F41.9       Follow-up Information     1. Schedule an appointment as soon as possible for a visit with Genaro Fernández PA-C.    Specialty:  Family Medicine    Contact information    [address]  [address]  [facility] [address] [phone]        Medication Information     Review all of your home medications and newly ordered medications with your primary doctor and/or pharmacist as soon as possible. Follow medication instructions as directed by your doctor and/or pharmacist.     Please keep your complete medication list with you and share with your physician. Update the information when medications are discontinued, doses are changed, or new medications (including over-the-counter products) are added; and carry medication information at all times in the event of emergency situations.               Medication List      START taking these medications        Instructions    Morning Afternoon Evening Bedtime    lorazepam 0.5 MG Tabs   Commonly known as:  ATIVAN        Take 1 Tab by mouth every four hours as needed for Anxiety.   Dose:  0.5 mg                          ASK your doctor about these medications        Instructions    Morning Afternoon Evening Bedtime    escitalopram 5 MG tablet   Commonly known as:  LEXAPRO        Take 1 Tab by mouth every day.   Dose:  5 mg                        omeprazole 20 MG delayed-release capsule   Commonly known as:  PRILOSEC        Take 1 Cap by mouth every day.   Dose:  20 mg                             Where to Get  Your Medications      You can get these medications from any pharmacy     Bring a paper prescription for each of these medications    - lorazepam 0.5 MG Tabs            Procedures and tests performed during your visit     CBC w/ Differential    Cardiac Monitoring    Complete Metabolic Panel (CMP)    DX-CHEST-PORTABLE (1 VIEW)    EKG (NOW)    ESTIMATED GFR    IV Saline Lock    Lipase    Pulse Ox    Troponin STAT        Discharge Instructions       Panic Attacks  Panic attacks are sudden, short feelings of great fear or discomfort. You may have them for no reason when you are relaxed, when you are uneasy (anxious), or when you are sleeping.   HOME CARE  · Take all your medicines as told.  · Check with your doctor before starting new medicines.  · Keep all doctor visits.  GET HELP IF:  · You are not able to take your medicines as told.  · Your symptoms do not get better.  · Your symptoms get worse.  GET HELP RIGHT AWAY IF:  · Your attacks seem different than your normal attacks.  · You have thoughts about hurting yourself or others.  · You take panic attack medicine and you have a side effect.  MAKE SURE YOU:  · Understand these instructions.  · Will watch your condition.  · Will get help right away if you are not doing well or get worse.     This information is not intended to replace advice given to you by your health care provider. Make sure you discuss any questions you have with your health care provider.     Document Released: 01/20/2012 Document Revised: 10/08/2014 Document Reviewed: 08/01/2014  Elsevier Interactive Patient Education ©2016 [employer]            Patient Information     Patient Information    Following emergency treatment: all patient requiring follow-up care must return either to a private physician or a clinic if your condition worsens before you are able to obtain further medical attention, please return to the emergency room.     Billing Information    At [facility], we work to make the  billing process streamlined for our patients.  Our Representatives are here to answer any questions you may have regarding your hospital bill.  If you have insurance coverage and have supplied your insurance information to us, we will submit a claim to your insurer on your behalf.  Should you have any questions regarding your bill, we can be reached online or by phone as follows:  Online: You are able pay your bills online or live chat with our representatives about any billing questions you may have. We are here to help Monday - Friday from 8:00am to 7:30pm and 9:00am - 12:00pm on Saturdays.  Please visit [email or web address]  for more information.   Phone:  [phone] or [phone]    Please note that your emergency physician, surgeon, pathologist, radiologist, anesthesiologist, and other specialists are not employed by [facility] and will therefore bill separately for their services.  Please contact them directly for any questions concerning their bills at the numbers below:     Emergency Physician Services:  [phone]  [facility]:  [phone]  [facility]:  [phone]  [facility]:  [phone]    1. Your final bill may vary from the amount quoted upon discharge if all procedures are not complete at that time, or if your doctor has additional procedures of which we are not aware. You will receive an additional bill if you return to the Emergency Department at [facility] for suture removal regardless of the facility of which the sutures were placed.     2. Please arrange for settlement of this account at the emergency registration.    3. All self-pay accounts are due in full at the time of treatment.  If you are unable to meet this obligation then payment is expected within 4-5 days.     4. If you have had radiology studies (CT, X-ray, Ultrasound, MRI), you have received a preliminary result during your emergency  department visit. Please contact the radiology department [phone] to receive a copy of your final result. Please discuss the Final result with your primary physician or with the follow up physician provided.     Crisis Hotline:  [place] Crisis Hotline:  5-539-FMSNNFV or 1-531.100.5350  [place] Crisis Hotline:    1-853.797.5803 or 078-560-9121         ED Discharge Follow Up Questions    1. In order to provide you with very good care, we would like to follow up with a phone call in the next few days.  May we have your permission to contact you?     YES /  NO    2. What is the best phone number to call you? (       )_____-__________    3. What is the best time to call you?      Morning  /  Afternoon  /  Evening                   Patient Signature:  ____________________________________________________________    Date:  ____________________________________________________________      Your appointments     Jun 19, 2017 10:20 AM   Established Patient with Genaro Fernández PA-C   [facility]    [address] [facility]  [address]  [facility] [address] [phone]           You will be receiving a confirmation call a few days before your appointment from our automated call confirmation system.   Please bring to your appointment; a photo ID, copies of your insurance card, all medication bottles and any co-pay that you are responsible for.  Please allow 45-60 minutes to complete your scheduled appointment.            Jun 26, 2017  9:00 AM   Established Patient with Genaro Fernández PA-C   [facility])    [address]  [address]  [facility] [address] [phone]           You will be receiving a confirmation call a few days before your appointment from our automated call confirmation system.

## 2017-06-19 NOTE — ED NOTES
"Pt states starting new anxiety medication on Tuesday, last dose yesterday d/t \"chest pain.\"  Pt states continued chest pain today with hx of tachycardia and anxiety.    "

## 2017-06-19 NOTE — ED NOTES
Pt ride verified in room at time of DC.  Pt provided discharge instructions.  In such instructions, the patient made aware of medical diagnosis, treatment team, follow up recommendations for continuity of care, how to access [facility] health information online, information on medical prescriptions (how to take, when to take/not to take, side effects and adverse effects), and other health information pertinent to patient's diagnosis.  Patient verbalized understanding of discharge information.

## 2017-06-19 NOTE — ASSESSMENT & PLAN NOTE
Patient with past medical history of anxiety. Upon evaluation with primary care provider we have done Holter monitor as well as initiated patient on SSRI. Patient states he had been taking the Lexapro for less than a week. Patient states he was noticing on the Lexapro increased anxiety. Patient states that he was noticing some centralized chest pain yesterday and therefore went to the emergency department. Patient's EKG, labs were ordered. Patient states discharge stable condition. Patient was given prescription for Ativan. States he has not picked it up yet. Presents to clinic today for further evaluation. Patient has not taken today's dose of Lexapro. Patient states he is feeling better. Patient states he does have baseline anxiety on no medicine. Patient states he is open to trying a different medication as he would like to get underlying anxiety control.

## 2017-06-19 NOTE — PROGRESS NOTES
Chief Complaint   Patient presents with   • Medication Reaction     since starting medication he is having more anxitey        HISTORY OF PRESENT ILLNESS: Patient is a 26 y.o. male established patient who presents today to discuss anxiety    Anxiety  Patient with past medical history of anxiety. Upon evaluation with primary care provider we have done Holter monitor as well as initiated patient on SSRI. Patient states he had been taking the Lexapro for less than a week. Patient states he was noticing on the Lexapro increased anxiety. Patient states that he was noticing some centralized chest pain yesterday and therefore went to the emergency department. Patient's EKG, labs were ordered. Patient states discharge stable condition. Patient was given prescription for Ativan. States he has not picked it up yet. Presents to clinic today for further evaluation. Patient has not taken today's dose of Lexapro. Patient states he is feeling better. Patient states he does have baseline anxiety on no medicine. Patient states he is open to trying a different medication as he would like to get underlying anxiety control.       Patient Active Problem List    Diagnosis Date Noted   • Inappropriate sinus tachycardia (HCC) 08/08/2014     Priority: Medium   • Anxiety 08/08/2014     Priority: Low   • Mixed hyperlipidemia 06/12/2017   • Palpitations 05/16/2017   • Diffuse abdominal pain 08/10/2016   • Diarrhea 08/10/2016   • Essential hypertension 08/05/2016   • Dyspepsia 08/05/2016       Allergies:Lexapro    Current Outpatient Prescriptions   Medication Sig Dispense Refill   • citalopram (CELEXA) 10 MG tablet Take 1 Tab by mouth every day. 30 Tab 01   • lorazepam (ATIVAN) 0.5 MG Tab Take 1 Tab by mouth every four hours as needed for Anxiety. 10 Tab 0   • omeprazole (PRILOSEC) 20 MG delayed-release capsule Take 1 Cap by mouth every day. 30 Cap 2     No current facility-administered medications for this visit.       Social History  "  Substance Use Topics   • Smoking status: Never Smoker    • Smokeless tobacco: Never Used   • Alcohol Use: 6.0 oz/week     10 Cans of beer per week      Comment: weekends       Family Status   Relation Status Death Age   • Mother Alive    • Father Alive      Family History   Problem Relation Age of Onset   • Hypertension Mother        Review of Systems:   Constitutional: Negative for fever, chills, weight loss and malaise/fatigue.   HENT: Negative for ear pain, nosebleeds, congestion, sore throat and neck pain.    Eyes: Negative for blurred vision.   Respiratory: Negative for cough, sputum production, shortness of breath and wheezing.    Cardiovascular: Negative for chest pain, palpitations, orthopnea and leg swelling.   Gastrointestinal: Negative for heartburn, nausea, vomiting and abdominal pain.   Genitourinary: Negative for dysuria, urgency and frequency.   Musculoskeletal: Negative for myalgias, back pain and joint pain.   Skin: Negative for rash and itching.   Neurological: Negative for dizziness, tingling, tremors, sensory change, focal weakness and headaches.   Endo/Heme/Allergies: Does not bruise/bleed easily.   Psychiatric/Behavioral: Negative for depression, suicidal ideas and memory loss.  The patient is nervous/anxious and does not have insomnia.    All other systems reviewed and are negative except as in HPI.    Exam:  Blood pressure 122/88, pulse 88, temperature 37.2 °C (99 °F), resp. rate 16, height 1.753 m (5' 9.02\"), weight 81.557 kg (179 lb 12.8 oz), SpO2 98 %.  General:  Well nourished, well developed male in NAD  Head: is grossly normal.  Neck: Supple   Pulmonary: Respiratory distress.  Extremities: no clubbing, cyanosis, or edema.    Medical decision-making and discussion: In discussion with patient Lexapro has caused increased symptoms. We discontinue medication due to side effects. We will start patient on citalopram 10 mg. Discussed with patient given allergy panel with Lexapro I would like " him to start on one half tab by mouth daily for one week, then increase to one full tablet by mouth daily for one week. Patient will then return to clinic in 2 weeks on 10 mg to see how he is doing. If no increasing, worsening of symptoms we will increase to 20 mg as indicated or necessary. Patient is advised to continue taking lorazepam was given him by the emergency room this morning    Please note that this dictation was created using voice recognition software. I have made every reasonable attempt to correct obvious errors, but I expect that there are errors of grammar and possibly content that I did not discover before finalizing the note.    Assessment/Plan:  1. Anxiety  citalopram (CELEXA) 10 MG tablet

## 2017-06-19 NOTE — ED PROVIDER NOTES
ED Provider Note    CHIEF COMPLAINT  Chief Complaint   Patient presents with   • Chest Pain     pt states started taking new anxiety medication on thursday and since then started feeling bad, woke up this am with chest pains and n/v, resp are even and unlabored, denies sob or dizziness       HPI  Geremias Conte is a 26 y.o. male who presents to the emergency department with palpitations and anxiety. Patient has a history of anxiety and panic attack. He has had to take sedatives in the past. He was seen by primary provider and started on Lexapro 3 days ago. He states that since taking this medication he's just felt off. He can't really describe what is going on he just feels that something is wrong. Says this morning he woke up felt nauseated and had some indigestion chest discomfort and palpitations. He denies associated shortness of breath, syncope, dizziness or lightheadedness. He didn't actually vomit. He has not had diarrhea and denies abdominal pain. He never had any pleuritic pain, cough or hemoptysis. No leg swelling. No tearing pain or radiation of the back. No exertional symptoms or diaphoresis.    REVIEW OF SYSTEMS  As per HPI, otherwise a 10 point review of systems is negative    PAST MEDICAL HISTORY  Past Medical History   Diagnosis Date   • Inappropriate sinus tachycardia (HCC) 8/8/2014   • Anxiety 8/8/2014       SOCIAL HISTORY  Social History   Substance Use Topics   • Smoking status: Never Smoker    • Smokeless tobacco: Never Used   • Alcohol Use: 6.0 oz/week     10 Cans of beer per week      Comment: weekends       SURGICAL HISTORY  No past surgical history on file.    CURRENT MEDICATIONS  Home Medications     **Home medications have not yet been reviewed for this encounter**          ALLERGIES  Allergies   Allergen Reactions   • Lexapro Unspecified     Made patient more anxious       PHYSICAL EXAM  VITAL SIGNS: /99 mmHg  Pulse 78  Temp(Src) 36.4 °C (97.5 °F)  Resp 14  Wt 82 kg (180 lb 12.4  oz)  SpO2 98%   Constitutional: Awake and alert  HENT:  Atraumatic, Normocephalic.Oropharynx moist mucus membranes, Nose normal inspection.   Eyes: Normal inspection  Neck: Supple  Cardiovascular: Normal heart rate, Normal rhythm.  Symmetric peripheral pulses.   Thorax & Lungs: No respiratory distress, No wheezing, No rales, No rhonchi, No chest tenderness.   Abdomen: Bowel sounds normal, soft, non-distended, nontender, no mass  Skin: Warm, Dry, No rash.   Back: No tenderness, No CVA tenderness.   Extremities: No clubbing, cyanosis, edema, no Homans or cords   Neurologic: Grossly normal   Psychiatric: Anxious appearing, no SI or HI. No hallucinations.    RADIOLOGY/PROCEDURES  DX-CHEST-PORTABLE (1 VIEW)   Final Result      No acute cardiopulmonary disease evident.         Imaging is interpreted by radiologist    Labs:  Results for orders placed or performed during the hospital encounter of 06/19/17   CBC w/ Differential   Result Value Ref Range    WBC 7.5 4.8 - 10.8 K/uL    RBC 5.19 4.70 - 6.10 M/uL    Hemoglobin 16.2 14.0 - 18.0 g/dL    Hematocrit 45.5 42.0 - 52.0 %    MCV 87.7 81.4 - 97.8 fL    MCH 31.2 27.0 - 33.0 pg    MCHC 35.6 (H) 33.7 - 35.3 g/dL    RDW 37.2 35.9 - 50.0 fL    Platelet Count 247 164 - 446 K/uL    MPV 10.3 9.0 - 12.9 fL    Neutrophils-Polys 63.80 44.00 - 72.00 %    Lymphocytes 23.20 22.00 - 41.00 %    Monocytes 9.60 0.00 - 13.40 %    Eosinophils 2.50 0.00 - 6.90 %    Basophils 0.50 0.00 - 1.80 %    Immature Granulocytes 0.40 0.00 - 0.90 %    Nucleated RBC 0.00 /100 WBC    Neutrophils (Absolute) 4.78 1.82 - 7.42 K/uL    Lymphs (Absolute) 1.74 1.00 - 4.80 K/uL    Monos (Absolute) 0.72 0.00 - 0.85 K/uL    Eos (Absolute) 0.19 0.00 - 0.51 K/uL    Baso (Absolute) 0.04 0.00 - 0.12 K/uL    Immature Granulocytes (abs) 0.03 0.00 - 0.11 K/uL    NRBC (Absolute) 0.00 K/uL   Complete Metabolic Panel (CMP)   Result Value Ref Range    Sodium 138 135 - 145 mmol/L    Potassium 3.6 3.6 - 5.5 mmol/L    Chloride  108 96 - 112 mmol/L    Co2 21 20 - 33 mmol/L    Anion Gap 9.0 0.0 - 11.9    Glucose 109 (H) 65 - 99 mg/dL    Bun 17 8 - 22 mg/dL    Creatinine 0.88 0.50 - 1.40 mg/dL    Calcium 9.7 8.5 - 10.5 mg/dL    AST(SGOT) 17 12 - 45 U/L    ALT(SGPT) 26 2 - 50 U/L    Alkaline Phosphatase 83 30 - 99 U/L    Total Bilirubin 0.6 0.1 - 1.5 mg/dL    Albumin 4.6 3.2 - 4.9 g/dL    Total Protein 8.0 6.0 - 8.2 g/dL    Globulin 3.4 1.9 - 3.5 g/dL    A-G Ratio 1.4 g/dL   Troponin STAT   Result Value Ref Range    Troponin I <0.01 0.00 - 0.04 ng/mL   Lipase   Result Value Ref Range    Lipase 18 11 - 82 U/L   ESTIMATED GFR   Result Value Ref Range    GFR If African American >60 >60 mL/min/1.73 m 2    GFR If Non African American >60 >60 mL/min/1.73 m 2   EKG (NOW)   Result Value Ref Range    Report       [facility] Emergency Dept.    Test Date:  2017  Pt Name:    TOMASZ ELI                  Department: ER  MRN:        [id number]                      Room:       [facility]  Gender:     M                            Technician: [id number]  :        1990                   Requested By:SANA DONALDSON  Order #:    [id number]                    Reading MD: AUSTIN FRAZIER MD    Measurements  Intervals                                Axis  Rate:       84                           P:          44  FL:         152                          QRS:        85  QRSD:       86                           T:          16  QT:         332  QTc:        393    Interpretive Statements  SINUS RHYTHM  Compared to ECG 2014 11:11:28  T-wave abnormality no longer present    Electronically Signed On 2017 4:27:52 PDT by AUSTIN FRAZIER MD         COURSE & MEDICAL DECISION MAKING  Patient presents describing palpitations and not feeling well. Likely this is an adjustment period related to initiation of Lexapro. He did complain of some chest discomfort. His EKG does not show any ischemia. He doesn't have any evidence of acute  abnormalities. Screening data was reassuring. His chest x-ray was negative. I do not see suggestion of dissection or pulmonary embolism. No suggestion of acute coronary syndrome. At this point I have given him a dose of Ativan in the ER. He actually has a appointment with his primary doctor in about 5 hours. I have encouraged him to keep this appointment where he can discuss plan of action. He likely simply requires additional time to adjust to his new medication. He may benefit from a short acting antianxiety medication on an outpatient basis. I precautioned the patient to return to the ER for difficulty breathing, recurrent chest pain or concerning symptoms.    FINAL IMPRESSION  1. Chest pain  2. Palpitations  3. Anxiety      This dictation was created using voice recognition software. The accuracy of the dictation is limited to the abilities of the software.  The nursing notes were reviewed and certain aspects of this information were incorporated into this note.      Electronically signed by: Marky Medeiros, 6/19/2017 11:54 AM

## 2017-06-19 NOTE — ED AVS SNAPSHOT
Rithmio Access Code: Activation code not generated  Current Rithmio Status: Active    [employer]  A secure, online tool to manage your health information     [employer]’s Rithmio® is a secure, online tool that connects you to your personalized health information from the privacy of your home -- day or night - making it very easy for you to manage your healthcare. Once the activation process is completed, you can even access your medical information using the Rithmio sukhjinder, which is available for free in the Apple Sukhjinder store or Google Play store.     Rithmio provides the following levels of access (as shown below):   My Chart Features   [facility] Primary Care Doctor [facility]  Specialists [facility]  Urgent  Care Non-[facility]  Primary Care  Doctor   Email your healthcare team securely and privately 24/7 X X X X   Manage appointments: schedule your next appointment; view details of past/upcoming appointments X      Request prescription refills. X      View recent personal medical records, including lab and immunizations X X X X   View health record, including health history, allergies, medications X X X X   Read reports about your outpatient visits, procedures, consult and ER notes X X X X   See your discharge summary, which is a recap of your hospital and/or ER visit that includes your diagnosis, lab results, and care plan. X X       How to register for Rithmio:  1. Go to  [email or web address].  2. Click on the Sign Up Now box, which takes you to the New Member Sign Up page. You will need to provide the following information:  a. Enter your Rithmio Access Code exactly as it appears at the top of this page. (You will not need to use this code after you’ve completed the sign-up process. If you do not sign up before the expiration date, you must request a new code.)   b. Enter your date of birth.   c. Enter your home email address.   d. Click Submit, and follow the next screen’s instructions.  3. Create a Rithmio ID. This will  be your [employer] login ID and cannot be changed, so think of one that is secure and easy to remember.  4. Create a [employer] password. You can change your password at any time.  5. Enter your Password Reset Question and Answer. This can be used at a later time if you forget your password.   6. Enter your e-mail address. This allows you to receive e-mail notifications when new information is available in [employer].  7. Click Sign Up. You can now view your health information.    For assistance activating your [employer] account, call [phone]

## 2017-06-19 NOTE — ED AVS SNAPSHOT
6/19/2017    Geremias Conte  [address] [place] [address]    Dear Geremias:    [facility] wants to ensure your discharge home is safe and you or your loved ones have had all of your questions answered regarding your care after you leave the hospital.    Below is a list of resources and contact information should you have any questions regarding your hospital stay, follow-up instructions, or active medical symptoms.    Questions or Concerns Regarding… Contact   Medical Questions Related to Your Discharge  (7 days a week, 8am-5pm) Contact a Nurse Care Coordinator   [phone]   Medical Questions Not Related to Your Discharge  (24 hours a day / 7 days a week)  Contact the Nurse Health Line   [phone]    Medications or Discharge Instructions Refer to your discharge packet   or contact your [facility] Primary Care Provider   [phone]   Follow-up Appointment(s) Schedule your appointment via [employer]   or contact Scheduling [phone]   Billing Review your statement via [employer]  or contact Billing [phone]   Medical Records Review your records via [employer]   or contact Medical Records [phone]     You may receive a telephone call within two days of discharge. This call is to make certain you understand your discharge instructions and have the opportunity to have any questions answered. You can also easily access your medical information, test results and upcoming appointments via the [employer] free online health management tool. You can learn more and sign up at [employer]. For assistance setting up your [employer] account, please call [phone].    Once again, we want to ensure your discharge home is safe and that you have a clear understanding of any next steps in your care. If you have any questions or concerns, please do not hesitate to contact us, we are here for you. Thank you for choosing [facility] for your healthcare needs.    Sincerely,    Your [facility] Healthcare Team

## 2017-06-19 NOTE — ED NOTES
.  Chief Complaint   Patient presents with   • Chest Pain     pt states started taking new anxiety medication on thursday and since then started feeling bad, woke up this am with chest pains and n/v, resp are even and unlabored, denies sob or dizziness     .Pt Informed regarding triage process and verbalized understanding to inform triage tech or RN for any changes in condition.  Placed in lobby.

## 2017-06-23 ENCOUNTER — OFFICE VISIT (OUTPATIENT)
Dept: MEDICAL GROUP | Facility: MEDICAL CENTER | Age: 27
End: 2017-06-23
Payer: COMMERCIAL

## 2017-06-23 VITALS
OXYGEN SATURATION: 98 % | BODY MASS INDEX: 25.92 KG/M2 | SYSTOLIC BLOOD PRESSURE: 132 MMHG | HEART RATE: 105 BPM | DIASTOLIC BLOOD PRESSURE: 90 MMHG | TEMPERATURE: 98.8 F | WEIGHT: 175 LBS | HEIGHT: 69 IN

## 2017-06-23 DIAGNOSIS — F41.1 GAD (GENERALIZED ANXIETY DISORDER): ICD-10-CM

## 2017-06-23 PROCEDURE — 99214 OFFICE O/P EST MOD 30 MIN: CPT | Performed by: FAMILY MEDICINE

## 2017-06-23 RX ORDER — ALPRAZOLAM 0.5 MG/1
0.5 TABLET ORAL 3 TIMES DAILY PRN
Qty: 20 TAB | Refills: 0 | Status: SHIPPED | OUTPATIENT
Start: 2017-06-23 | End: 2017-07-07 | Stop reason: SDUPTHER

## 2017-06-23 NOTE — MR AVS SNAPSHOT
"        Geremias Dg   2017 2:40 PM   Office Visit   MRN: [id number]    Department:  [facility]   Dept Phone:  [phone]    Description:  Male : 1990   Provider:  Candace Miranda M.D.           Reason for Visit     Anxiety           Allergies as of 2017     Allergen Noted Reactions    Lexapro 2017   Unspecified    Made patient more anxious      You were diagnosed with     OPAL (generalized anxiety disorder)   [037676]         Vital Signs     Blood Pressure Pulse Temperature Height Weight Body Mass Index    132/90 mmHg 105 37.1 °C (98.8 °F) 1.753 m (5' 9\") 79.379 kg (175 lb) 25.83 kg/m2    Oxygen Saturation Smoking Status                98% Never Smoker           Basic Information     Date Of Birth Sex Race Ethnicity Preferred Language    1990 Male White  Origin (Cymraes,Malawian,Malawian,Italian, etc) English      Problem List              ICD-10-CM Priority Class Noted - Resolved    Inappropriate sinus tachycardia (HCC) (Chronic) R00.0 Medium  2014 - Present    Anxiety (Chronic) F41.9 Low  2014 - Present    Essential hypertension I10   2016 - Present    Dyspepsia R10.13   2016 - Present    Diffuse abdominal pain R10.84   8/10/2016 - Present    Diarrhea R19.7   8/10/2016 - Present    Palpitations R00.2   2017 - Present    Mixed hyperlipidemia E78.2   2017 - Present    OPAL (generalized anxiety disorder) F41.1   2017 - Present      Health Maintenance        Date Due Completion Dates    IMM DTaP/Tdap/Td Vaccine (5 - Tdap) 2004 2/3/2004, 1996, 1992, 1992, 1992    IMM HPV VACCINE (2 of 3 - Male 3 Dose Series) 2015 3/30/2015 (Declined)    Override on 3/30/2015: Patient Declined            Current Immunizations     Dtap Vaccine 1996, 1992, 1992, 1992    Hepatitis A Vaccine, Ped/Adol 2/3/2004, 2003    Hepatitis B Vaccine Non-Recombivax (Ped/Adol) 2003, 2003, 2002    TD Vaccine " 2/3/2004    Varicella Vaccine Live 1/29/2007, 5/29/2003      Below and/or attached are the medications your provider expects you to take. Review all of your home medications and newly ordered medications with your provider and/or pharmacist. Follow medication instructions as directed by your provider and/or pharmacist. Please keep your medication list with you and share with your provider. Update the information when medications are discontinued, doses are changed, or new medications (including over-the-counter products) are added; and carry medication information at all times in the event of emergency situations     Allergies:  LEXAPRO - Unspecified               Medications  Valid as of: June 23, 2017 -  3:26 PM    Generic Name Brand Name Tablet Size Instructions for use    ALPRAZolam (Tab) XANAX 0.5 MG Take 1 Tab by mouth 3 times a day as needed for Sleep or Anxiety.        Citalopram Hydrobromide (Tab) CELEXA 10 MG Take 1 Tab by mouth every day.        Omeprazole (CAPSULE DELAYED RELEASE) PRILOSEC 20 MG Take 1 Cap by mouth every day.        .                 Medicines prescribed today were sent to:     [facility]/PHARMACY #[id number] - [place], [place] - [address]    [address] [address]    Phone: [phone] Fax: [phone]    Open 24 Hours?: No      Medication refill instructions:       If your prescription bottle indicates you have medication refills left, it is not necessary to call your provider’s office. Please contact your pharmacy and they will refill your medication.    If your prescription bottle indicates you do not have any refills left, you may request refills at any time through one of the following ways: The online [employer] system (except Urgent Care), by calling your provider’s office, or by asking your pharmacy to contact your provider’s office with a refill request. Medication refills are processed only during regular business hours and may not be available until the next business day. Your  provider may request additional information or to have a follow-up visit with you prior to refilling your medication.   *Please Note: Medication refills are assigned a new Rx number when refilled electronically. Your pharmacy may indicate that no refills were authorized even though a new prescription for the same medication is available at the pharmacy. Please request the medicine by name with the pharmacy before contacting your provider for a refill.        Referral     A referral request has been sent to our patient care coordination department. Please allow 3-5 business days for us to process this request and contact you either by phone or mail. If you do not hear from us by the 5th business day, please call us at [phone].           [employer] Access Code: Activation code not generated  Current [employer] Status: Active

## 2017-06-23 NOTE — Clinical Note
June 23of , 2017         Patient: Geremias Cotne   YOB: 1990   Date of Visit: 6/23/2017           To Whom it May Concern:    Geremias Conte was seen in my clinic on 6/23/2017. He may return to work on 6/25/17.    If you have any questions or concerns, please don't hesitate to call.        Sincerely,           Candace Miranda M.D.  Electronically Signed

## 2017-06-23 NOTE — ASSESSMENT & PLAN NOTE
Patient is here today for follow-up of his anxiety. He normally sees Genaro Fernández.  Genaro had prescribed Celexa for him to start over a week ago but patient never started it. He tried Prozac about 3 weeks ago and didn't like how he felt on it so was afraid to start this medication. He's been taking the lorazepam that was prescribed but that doesn't seem to be helping. He was seen in the emergency room earlier today for this. He also had another emergency room visit this week where he had to be transported by ambulance. He is afraid something's wrong with his heart. He has had multiple negative EKGs and cardiac lab testing. He had a normal Holter monitor. He has had anxiety since he was a child. He reports that he has been on Xanax in the past and that worked better as well as diazepam. He denies any suicidal thoughts or plans. He reports that the anxiety is causing him to miss work. Patient had normal thyroid lab testing in May.

## 2017-07-07 DIAGNOSIS — F41.1 GAD (GENERALIZED ANXIETY DISORDER): ICD-10-CM

## 2017-07-07 RX ORDER — ALPRAZOLAM 0.5 MG/1
0.5 TABLET ORAL 3 TIMES DAILY PRN
Qty: 20 TAB | Refills: 0 | OUTPATIENT
Start: 2017-07-07

## 2017-07-07 RX ORDER — ALPRAZOLAM 0.5 MG/1
0.5 TABLET ORAL 3 TIMES DAILY PRN
Qty: 20 TAB | Refills: 0 | Status: CANCELLED | OUTPATIENT
Start: 2017-07-07

## 2017-07-07 NOTE — TELEPHONE ENCOUNTER
Was the patient seen in the last year in this department? Yes     Does patient have an active prescription for medications requested? No     Received Request Via: Pharmacy     Last seen: 06/23/2017 [place]

## 2017-07-07 NOTE — TELEPHONE ENCOUNTER
From: Geremias Conte  To: Candace Miranda M.D.  Sent: 7/7/2017 12:53 PM PDT  Subject: Medication Renewal Request    Original authorizing provider: TITI Reich would like a refill of the following medications:  alprazolam (XANAX) 0.5 MG Tab [Candace Miranda M.D.]    Preferred pharmacy: [facility]/PHARMACY #[id number] - [place], [place] - [id number]    Comment:

## 2017-07-07 NOTE — TELEPHONE ENCOUNTER
From: Geremias Conte  To: Candace Miranda M.D.  Sent: 7/7/2017 4:03 PM PDT  Subject: Medication Renewal Request    Original authorizing provider: TITI Reich would like a refill of the following medications:  alprazolam (XANAX) 0.5 MG Tab [Candace Miranda M.D.]    Preferred pharmacy: [facility]/PHARMACY #[id number] - [place], [place] - [id number]    Comment:

## 2017-07-31 ENCOUNTER — OFFICE VISIT (OUTPATIENT)
Dept: MEDICAL GROUP | Facility: MEDICAL CENTER | Age: 27
End: 2017-07-31
Payer: COMMERCIAL

## 2017-07-31 VITALS
DIASTOLIC BLOOD PRESSURE: 80 MMHG | BODY MASS INDEX: 27.92 KG/M2 | TEMPERATURE: 98.2 F | SYSTOLIC BLOOD PRESSURE: 110 MMHG | HEART RATE: 91 BPM | HEIGHT: 69 IN | OXYGEN SATURATION: 96 % | RESPIRATION RATE: 16 BRPM | WEIGHT: 188.49 LBS

## 2017-07-31 DIAGNOSIS — F41.9 ANXIETY: Chronic | ICD-10-CM

## 2017-07-31 PROCEDURE — 99214 OFFICE O/P EST MOD 30 MIN: CPT | Performed by: PHYSICIAN ASSISTANT

## 2017-07-31 RX ORDER — ALPRAZOLAM 0.5 MG/1
TABLET ORAL
Qty: 90 TAB | Refills: 0 | Status: SHIPPED | OUTPATIENT
Start: 2017-07-31 | End: 2017-08-21 | Stop reason: SDUPTHER

## 2017-07-31 RX ORDER — CITALOPRAM 20 MG/1
20 TABLET ORAL DAILY
Qty: 30 TAB | Refills: 2 | Status: SHIPPED | OUTPATIENT
Start: 2017-07-31 | End: 2017-09-05

## 2017-07-31 NOTE — PROGRESS NOTES
Chief Complaint   Patient presents with   • Follow-Up       HISTORY OF PRESENT ILLNESS: Patient is a 26 y.o. male established patient who presents today to follow-up on anxiety.    Patient presents to clinic today to follow up on anxiety. During previous encounters we did start patient on citalopram 10 mg. Patient admits that he doing significantly better. Patient's he feels,. Patient states that his heart feels like its calming down. Patient states that the chest pain sensations the patient has had in the past seems to be doing better. Patient states no homicidal or suicidal ideations. Denies auditory, visual or tactile hallucinations. Patient states after discussion feels icon occasion he still has episodes of palpitations or anxiety on occasion. After discussing with patient patient wishes increased dose.    No problem-specific assessment & plan notes found for this encounter.       Patient Active Problem List    Diagnosis Date Noted   • Inappropriate sinus tachycardia (HCC) 08/08/2014     Priority: Medium   • Anxiety 08/08/2014     Priority: Low   • OPAL (generalized anxiety disorder) 06/23/2017   • Mixed hyperlipidemia 06/12/2017   • Palpitations 05/16/2017   • Diffuse abdominal pain 08/10/2016   • Diarrhea 08/10/2016   • Essential hypertension 08/05/2016   • Dyspepsia 08/05/2016       Allergies:Lexapro    Current Outpatient Prescriptions   Medication Sig Dispense Refill   • alprazolam (XANAX) 0.5 MG Tab TAKE 1 TABLET BY MOUTH 3 TIMES DAILY AS NEEDED FOR SLEEP OR ANXIETY 90 Tab 0   • citalopram (CELEXA) 20 MG Tab Take 1 Tab by mouth every day. 30 Tab 2   • omeprazole (PRILOSEC) 20 MG delayed-release capsule Take 1 Cap by mouth every day. 30 Cap 2     No current facility-administered medications for this visit.       Social History   Substance Use Topics   • Smoking status: Never Smoker    • Smokeless tobacco: Never Used   • Alcohol Use: 6.0 oz/week     10 Cans of beer per week      Comment: weekends       Family  "Status   Relation Status Death Age   • Mother Alive    • Father Alive      Family History   Problem Relation Age of Onset   • Hypertension Mother        Review of Systems:   Constitutional: Negative for fever, chills, weight loss and malaise/fatigue.   HENT: Negative for ear pain, nosebleeds, congestion, sore throat and neck pain.    Eyes: Negative for blurred vision.   Respiratory: Negative for cough, sputum production, shortness of breath and wheezing.    Cardiovascular: Negative for chest pain, palpitations, orthopnea and leg swelling.   Gastrointestinal: Negative for heartburn, nausea, vomiting and abdominal pain.   Genitourinary: Negative for dysuria, urgency and frequency.   Musculoskeletal: Negative for myalgias, back pain and joint pain.   Skin: Negative for rash and itching.   Neurological: Negative for dizziness, tingling, tremors, sensory change, focal weakness and headaches.   Endo/Heme/Allergies: Does not bruise/bleed easily.   Psychiatric/Behavioral: Negative for depression, suicidal ideas and memory loss.  The patient is not nervous/anxious and does not have insomnia.    All other systems reviewed and are negative except as in HPI.    Exam:  Blood pressure 110/80, pulse 91, temperature 36.8 °C (98.2 °F), resp. rate 16, height 1.753 m (5' 9.02\"), weight 85.5 kg (188 lb 7.9 oz), SpO2 96 %.  General:  Well nourished, well developed male in NAD  Head: is grossly normal.  Neck: Supple without JVD or bruit. Thyroid is not enlarged.  Pulmonary: Clear to ausculation. Normal effort. No rales, ronchi, or wheezing.  Cardiovascular: Regular rate and rhythm without murmur. Carotid and radial pulses are intact and equal bilaterally.  Extremities: no clubbing, cyanosis, or edema.    Medical decision-making and discussion: Increased dose to 20 mg. Refilled Celexa. Reevaluation 30 days. ER precautions given to patient    Please note that this dictation was created using voice recognition software. I have made every " reasonable attempt to correct obvious errors, but I expect that there are errors of grammar and possibly content that I did not discover before finalizing the note.    Assessment/Plan:  1. Anxiety  alprazolam (XANAX) 0.5 MG Tab    citalopram (CELEXA) 20 MG Tab

## 2017-08-16 DIAGNOSIS — F41.9 ANXIETY: Chronic | ICD-10-CM

## 2017-08-16 NOTE — TELEPHONE ENCOUNTER
Was the patient seen in the last year in this department? Yes     Does patient have an active prescription for medications requested? Yes     Received Request Via: Pharmacy   Last office visit: 07/31/2017  Last labs: 06/19/2017

## 2017-08-17 RX ORDER — ALPRAZOLAM 0.5 MG/1
TABLET ORAL
Qty: 90 TAB | Refills: 0 | OUTPATIENT
Start: 2017-08-17

## 2017-08-21 ENCOUNTER — OFFICE VISIT (OUTPATIENT)
Dept: MEDICAL GROUP | Facility: MEDICAL CENTER | Age: 27
End: 2017-08-21
Payer: COMMERCIAL

## 2017-08-21 ENCOUNTER — HOSPITAL ENCOUNTER (OUTPATIENT)
Dept: LAB | Facility: MEDICAL CENTER | Age: 27
End: 2017-08-21
Attending: FAMILY MEDICINE
Payer: COMMERCIAL

## 2017-08-21 VITALS
HEIGHT: 69 IN | OXYGEN SATURATION: 97 % | TEMPERATURE: 98.6 F | SYSTOLIC BLOOD PRESSURE: 134 MMHG | WEIGHT: 183 LBS | BODY MASS INDEX: 27.11 KG/M2 | DIASTOLIC BLOOD PRESSURE: 82 MMHG | HEART RATE: 67 BPM

## 2017-08-21 DIAGNOSIS — E78.2 MIXED HYPERLIPIDEMIA: ICD-10-CM

## 2017-08-21 DIAGNOSIS — F41.1 GAD (GENERALIZED ANXIETY DISORDER): ICD-10-CM

## 2017-08-21 LAB
CHOLEST SERPL-MCNC: 192 MG/DL (ref 100–199)
HDLC SERPL-MCNC: 41 MG/DL
LDLC SERPL CALC-MCNC: 118 MG/DL
TRIGL SERPL-MCNC: 165 MG/DL (ref 0–149)

## 2017-08-21 PROCEDURE — 80061 LIPID PANEL: CPT

## 2017-08-21 PROCEDURE — 99214 OFFICE O/P EST MOD 30 MIN: CPT | Performed by: FAMILY MEDICINE

## 2017-08-21 PROCEDURE — 36415 COLL VENOUS BLD VENIPUNCTURE: CPT

## 2017-08-21 RX ORDER — ALPRAZOLAM 0.5 MG/1
TABLET ORAL
Qty: 90 TAB | Refills: 0 | Status: SHIPPED | OUTPATIENT
Start: 2017-08-21 | End: 2017-09-05

## 2017-08-21 NOTE — PROGRESS NOTES
Subjective:     Chief Complaint   Patient presents with   • Establish Care   • Medication Refill     xanax       History of Present Illness:  Geremias Conte is a 26 y.o. male established patient who presents today to establish primary care with me, and to discuss management of generalized anxiety disorder as well as mixed hyperlipidemia:    OPAL (generalized anxiety disorder)  Patient is concerned for palpitations that he is experiencing on a daily basis. Patient states that this is only well-controlled taking Xanax 0.5 mg by mouth 3 times a day. Patient states that, overall, symptoms have improved after starting Celexa.     Patient has been on Celexa at 10 mg for approximately 2 months. Patient states that he previously tried to increase his dose of Celexa 20 mg. However, he believes that his anxiety increase that day. Patient is unsure of if increase in anxiety was due to the medication dosage change or another factor.    Patient has had problems with anxiety with similar presentations to ER for palpitations versus chest pain since approximately 15 years old.  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    ROS is POSITIVE for palpitations, dyspnea, tachypnea, is otherwise NEGATIVE for generalized weakness/fatigue, dizziness, vision/hearing changes, chest pain/pressure, intense feelings of dread, insomnia, decreased appetite, persistent nausea.    Mixed hyperlipidemia  Patient states he was formally diagnosed hypertriglyceridemia along with high total cholesterol. Patient had discussed starting medications with his former PCP, Genaro Fernández, however they decided that he should first pursue dietary changes. Patient has made some changes to his diet over the last 3 months, believes he lost a  "significant amount of weight, however strength regained a few pounds. Patient would like to know if he has made significant interval improvement.    Apart from anxiety related chest pain/palpitations, patient does not have chest pain and pressure on exertion or at rest.    ROS is NEGATIVE for dizziness, generalized weakness/fatigue, vision/hearing changes, jaw pain/paresthesias, BUE pain/paresthesias/numbness/weakness, chest pain/pressure, palpitations, dyspnea, RUQ abdominal pain, oliguria/anuria, BLE edema.      Patient Active Problem List    Diagnosis Date Noted   • Inappropriate sinus tachycardia (HCC) 08/08/2014     Priority: Medium   • OPAL (generalized anxiety disorder) 06/23/2017   • Mixed hyperlipidemia 06/12/2017   • Palpitations 05/16/2017   • Essential hypertension 08/05/2016   • Dyspepsia 08/05/2016       Additional History:   Allergies:    Lexapro     Current Medications:     Current Outpatient Prescriptions   Medication Sig Dispense Refill   • alprazolam (XANAX) 0.5 MG Tab TAKE 1 TABLET BY MOUTH 3 TIMES DAILY AS NEEDED FOR SLEEP OR ANXIETY 90 Tab 0   • citalopram (CELEXA) 20 MG Tab Take 1 Tab by mouth every day. 30 Tab 2   • omeprazole (PRILOSEC) 20 MG delayed-release capsule Take 1 Cap by mouth every day. 30 Cap 2     No current facility-administered medications for this visit.        Social History:     Social History   Substance Use Topics   • Smoking status: Never Smoker    • Smokeless tobacco: Never Used   • Alcohol Use: 6.0 oz/week     10 Cans of beer per week      Comment: weekends        ROS:     - NOTE: All other systems reviewed and are negative, except as in HPI.     Objective:   Physical Exam:    Vitals: Blood pressure 134/82, pulse 67, temperature 37 °C (98.6 °F), height 1.753 m (5' 9\"), weight 83.008 kg (183 lb), SpO2 97 %.   BMI: Body mass index is 27.01 kg/(m^2).   General/Constitutional: Vitals as above, Well nourished, well developed male in no acute distress   Head/Eyes: Head is " grossly normal & atraumatic, bilateral conjunctivae clear and not injected, bilateral EOMI, bilateral PERRL   ENT: Bilateral external ears grossly normal in appearance, Hearing grossly intact, External nares normal in appearance and without discharge/bleeding   Respiratory: No respiratory distress, bilateral lungs are clear to ausculation in all lung fields (anterior/lateral/posterior), no wheezing/rhonchi/rales   Cardiovascular: Regular rate and rhythm without murmur/gallops/rubs, distal pulses are intact and equal bilaterally (radial, posterior tibial), no bilateral lower extremity edema   MSK: Gait grossly normal & not antalgic   Integumentary: No apparent rashes   Psych: Judgment grossly appropriate, apparent mild anxiety    Imaging/Labs:      - Review of previous labs as ordered by Genaro Fernández show the patient has hypertriglyceridemia to the 400s    Assessment and Plan:   1. OPAL (generalized anxiety disorder)  Uncontrolled. Patient instructed to increase Celexa 20 mg by mouth daily. Patient to notify me by my chart if he is having increase of symptoms so (particularly nausea, emesis, loose stools, erectile dysfunction, but also including anxiety as he previously believed to be secondary to Celexa). Additionally, patient can take Xanax, although was cautioned to try to decrease this over time.  Patient verbalized understanding.   - alprazolam (XANAX) 0.5 MG Tab; TAKE 1 TABLET BY MOUTH 3 TIMES DAILY AS NEEDED FOR SLEEP OR ANXIETY  Dispense: 90 Tab; Refill: 0    2. Mixed hyperlipidemia  Unknown control. Lipid profile is below to evaluate.   - LIPID PROFILE; Future      RTC: in 2 weeks for labs review + anxiety f/v.    PLEASE NOTE: This dictation was created using voice recognition software. I have made every reasonable attempt to correct obvious errors, but I expect that there are errors of grammar and possibly content that I did not discover before finalizing the note.

## 2017-08-21 NOTE — ASSESSMENT & PLAN NOTE
Patient is concerned for palpitations that he is experiencing on a daily basis. Patient states that this is only well-controlled taking Xanax 0.5 mg by mouth 3 times a day. Patient states that, overall, symptoms have improved after starting Celexa.     Patient has been on Celexa at 10 mg for approximately 2 months. Patient states that he previously tried to increase his dose of Celexa 20 mg. However, he believes that his anxiety increase that day. Patient is unsure of if increase in anxiety was due to the medication dosage change or another factor.    Patient has had problems with anxiety with similar presentations to ER for palpitations versus chest pain since approximately 15 years old.  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    ROS is POSITIVE for palpitations, dyspnea, tachypnea, is otherwise NEGATIVE for generalized weakness/fatigue, dizziness, vision/hearing changes, chest pain/pressure, intense feelings of dread, insomnia, decreased appetite, persistent nausea.

## 2017-08-21 NOTE — ASSESSMENT & PLAN NOTE
Patient states he was formally diagnosed hypertriglyceridemia along with high total cholesterol. Patient had discussed starting medications with his former PCP, Genaro Fernández, however they decided that he should first pursue dietary changes. Patient has made some changes to his diet over the last 3 months, believes he lost a significant amount of weight, however strength regained a few pounds. Patient would like to know if he has made significant interval improvement.    Apart from anxiety related chest pain/palpitations, patient does not have chest pain and pressure on exertion or at rest.    ROS is NEGATIVE for dizziness, generalized weakness/fatigue, vision/hearing changes, jaw pain/paresthesias, BUE pain/paresthesias/numbness/weakness, chest pain/pressure, palpitations, dyspnea, RUQ abdominal pain, oliguria/anuria, BLE edema.

## 2017-08-21 NOTE — MR AVS SNAPSHOT
"        Geremias Dg   2017 8:00 AM   Office Visit   MRN: [id number]    Department:  [place] [address]   Dept Phone:  [phone]    Description:  Male : 1990   Provider:  Bro Mcdaniel M.D.           Reason for Visit     Establish Care     Medication Refill xanax      Allergies as of 2017     Allergen Noted Reactions    Lexapro 2017   Unspecified    Made patient more anxious      You were diagnosed with     OPAL (generalized anxiety disorder)   [405612]       Mixed hyperlipidemia   [272.2.ICD-9-CM]         Vital Signs     Blood Pressure Pulse Temperature Height Weight Body Mass Index    134/82 mmHg 67 37 °C (98.6 °F) 1.753 m (5' 9\") 83.008 kg (183 lb) 27.01 kg/m2    Oxygen Saturation Smoking Status                97% Never Smoker           Basic Information     Date Of Birth Sex Race Ethnicity Preferred Language    1990 Male White  Origin (Macedonian,Danish,Micronesian,Shmuel, etc) English      Your appointments     Sep 05, 2017  9:00 AM   Established Patient with Bro Mcdaniel M.D.   [facility]    [address]  [address]  [place] [id number] [phone]           You will be receiving a confirmation call a few days before your appointment from our automated call confirmation system.              Problem List              ICD-10-CM Priority Class Noted - Resolved    Inappropriate sinus tachycardia (HCC) (Chronic) R00.0 Medium  2014 - Present    Essential hypertension I10   2016 - Present    Dyspepsia R10.13   2016 - Present    Palpitations R00.2   2017 - Present    Mixed hyperlipidemia E78.2   2017 - Present    OPAL (generalized anxiety disorder) F41.1   2017 - Present      Health Maintenance        Date Due Completion Dates    IMM DTaP/Tdap/Td Vaccine (5 - Tdap) 2004 2/3/2004, 1996, 1992, 1992, 1992    IMM HPV VACCINE (2 of 3 - Male 3 Dose Series) 2015 3/30/2015 " (Declined)    Override on 3/30/2015: Patient Declined    IMM INFLUENZA (1) 9/1/2017 ---            Current Immunizations     Dtap Vaccine 2/20/1996, 6/17/1992, 4/17/1992, 2/19/1992    Hepatitis A Vaccine, Ped/Adol 2/3/2004, 8/1/2003    Hepatitis B Vaccine Non-Recombivax (Ped/Adol) 8/1/2003, 5/29/2003, 1/26/2002    TD Vaccine 2/3/2004    Varicella Vaccine Live 1/29/2007, 5/29/2003      Below and/or attached are the medications your provider expects you to take. Review all of your home medications and newly ordered medications with your provider and/or pharmacist. Follow medication instructions as directed by your provider and/or pharmacist. Please keep your medication list with you and share with your provider. Update the information when medications are discontinued, doses are changed, or new medications (including over-the-counter products) are added; and carry medication information at all times in the event of emergency situations     Allergies:  LEXAPRO - Unspecified               Medications  Valid as of: August 21, 2017 -  8:36 AM    Generic Name Brand Name Tablet Size Instructions for use    ALPRAZolam (Tab) XANAX 0.5 MG TAKE 1 TABLET BY MOUTH 3 TIMES DAILY AS NEEDED FOR SLEEP OR ANXIETY        Citalopram Hydrobromide (Tab) CELEXA 20 MG Take 1 Tab by mouth every day.        Omeprazole (CAPSULE DELAYED RELEASE) PRILOSEC 20 MG Take 1 Cap by mouth every day.        .                 Medicines prescribed today were sent to:     [facility] #[id number] - [place] - [address] [facility]    [address] [place] [address]    Phone: [phone] Fax: [phone]    Open 24 Hours?: No      Medication refill instructions:       If your prescription bottle indicates you have medication refills left, it is not necessary to call your provider’s office. Please contact your pharmacy and they will refill your medication.    If your prescription bottle indicates you do not have any refills left, you may request refills at any time  through one of the following ways: The online PT PAL system (except Urgent Care), by calling your provider’s office, or by asking your pharmacy to contact your provider’s office with a refill request. Medication refills are processed only during regular business hours and may not be available until the next business day. Your provider may request additional information or to have a follow-up visit with you prior to refilling your medication.   *Please Note: Medication refills are assigned a new Rx number when refilled electronically. Your pharmacy may indicate that no refills were authorized even though a new prescription for the same medication is available at the pharmacy. Please request the medicine by name with the pharmacy before contacting your provider for a refill.        Your To Do List     Future Labs/Procedures Complete By Expires    LIPID PROFILE  As directed 8/21/2018    Comments:    Fasting at least 8hours         PT PAL Access Code: Activation code not generated  Current PT PAL Status: Active

## 2017-09-05 ENCOUNTER — OFFICE VISIT (OUTPATIENT)
Dept: MEDICAL GROUP | Facility: MEDICAL CENTER | Age: 27
End: 2017-09-05
Payer: COMMERCIAL

## 2017-09-05 VITALS
HEART RATE: 80 BPM | SYSTOLIC BLOOD PRESSURE: 120 MMHG | BODY MASS INDEX: 27.3 KG/M2 | RESPIRATION RATE: 16 BRPM | TEMPERATURE: 99 F | OXYGEN SATURATION: 96 % | WEIGHT: 184.3 LBS | HEIGHT: 69 IN | DIASTOLIC BLOOD PRESSURE: 90 MMHG

## 2017-09-05 DIAGNOSIS — F41.1 GAD (GENERALIZED ANXIETY DISORDER): ICD-10-CM

## 2017-09-05 DIAGNOSIS — Z23 NEED FOR INFLUENZA VACCINATION: ICD-10-CM

## 2017-09-05 DIAGNOSIS — E78.2 MIXED HYPERLIPIDEMIA: ICD-10-CM

## 2017-09-05 PROCEDURE — 99214 OFFICE O/P EST MOD 30 MIN: CPT | Performed by: FAMILY MEDICINE

## 2017-09-05 RX ORDER — CITALOPRAM 40 MG/1
40 TABLET ORAL DAILY
Qty: 90 TAB | Refills: 1 | Status: SHIPPED | OUTPATIENT
Start: 2017-09-05 | End: 2017-10-24 | Stop reason: SDUPTHER

## 2017-09-05 RX ORDER — DIAZEPAM 2 MG/1
2 TABLET ORAL EVERY 12 HOURS PRN
Qty: 60 TAB | Refills: 0 | Status: SHIPPED | OUTPATIENT
Start: 2017-09-05 | End: 2017-10-03 | Stop reason: SDUPTHER

## 2017-09-05 NOTE — ASSESSMENT & PLAN NOTE
Uncontrolled, patient is taking his Celexa at 20 mg by mouth daily. Patient states he has been on this dose for possibly 2 weeks. However, patient states his anxiety is still uncontrolled, having to take Xanax 0.5 mg by mouth 4 times a day when necessary, some days taking only 2-3 times.    Patient admits to increasing his alcohol intake to approximately one 12 pack per weekend, can drink up to 6-7 beers within a few hours time.    Patient states that he may have intermittent palpitations and mild intensities of chest pain/pressure. However, these typically resolve within a minute.    Patient I also reviewed how his ASCVD risk could be increased, please see notes from same date of service 09/05/2017 Re: Mixed hyperlipidemia.    ROS is NEGATIVE for dizziness, generalized weakness/fatigue, vision/hearing changes, jaw pain/paresthesias, BUE pain/paresthesias/numbness/weakness, dyspnea, RUQ abdominal pain, oliguria/anuria, BLE edema.

## 2017-09-05 NOTE — ASSESSMENT & PLAN NOTE
Patient and I discussed his recent labs, that his temperature is 30 minutes improved greatly from a level CDX previously to watch his physical 5 currently. Patient has mixed dyslipidemia with also an LDL elevation. Patient I then discussed dietary recommendations which could help address these cholesterol imbalances.    ROS is NEGATIVE for dizziness, generalized weakness/fatigue, vision/hearing changes, jaw pain/paresthesias, BUE pain/paresthesias/numbness/weakness, dyspnea, RUQ abdominal pain, oliguria/anuria, BLE edema

## 2017-09-12 ENCOUNTER — PATIENT MESSAGE (OUTPATIENT)
Dept: MEDICAL GROUP | Facility: MEDICAL CENTER | Age: 27
End: 2017-09-12

## 2017-09-12 NOTE — TELEPHONE ENCOUNTER
From: Geremias Conte  To: Bro Mcdaniel M.D.  Sent: 9/12/2017 1:42 PM PDT  Subject: Prescription Question    Dr. Mcdaniel,    I have been taking the diazepam and it is not working it is giving me more anxiety and I'm having trouble sleeping. I'm almost out of the alprazolam can you please refill it? Or have any other suggestions.    Thank you.

## 2017-10-04 DIAGNOSIS — F41.1 GAD (GENERALIZED ANXIETY DISORDER): ICD-10-CM

## 2017-10-05 DIAGNOSIS — F41.1 GAD (GENERALIZED ANXIETY DISORDER): ICD-10-CM

## 2017-10-05 RX ORDER — DIAZEPAM 2 MG/1
2 TABLET ORAL EVERY 12 HOURS PRN
Qty: 60 TAB | Refills: 0 | Status: SHIPPED | OUTPATIENT
Start: 2017-10-05 | End: 2017-10-24 | Stop reason: SDUPTHER

## 2017-10-06 NOTE — TELEPHONE ENCOUNTER
Patient needs to schedule a clinic visit with me ASAP to follow-up on his Anxiety.  This is the last refill until he sees me in clinic.

## 2017-10-06 NOTE — TELEPHONE ENCOUNTER
Was the patient seen in the last year in this department? Yes     Does patient have an active prescription for medications requested? Yes     Received Request Via: Patient     Last Visit: 9/5/17    Last Labs: 8/21/17

## 2017-10-12 RX ORDER — DIAZEPAM 2 MG/1
2 TABLET ORAL EVERY 12 HOURS PRN
Qty: 60 TAB | Refills: 0
Start: 2017-10-12

## 2017-10-24 ENCOUNTER — OFFICE VISIT (OUTPATIENT)
Dept: MEDICAL GROUP | Facility: MEDICAL CENTER | Age: 27
End: 2017-10-24
Payer: COMMERCIAL

## 2017-10-24 VITALS
OXYGEN SATURATION: 95 % | SYSTOLIC BLOOD PRESSURE: 130 MMHG | HEIGHT: 69 IN | HEART RATE: 78 BPM | TEMPERATURE: 98.2 F | BODY MASS INDEX: 27.25 KG/M2 | DIASTOLIC BLOOD PRESSURE: 99 MMHG | RESPIRATION RATE: 14 BRPM | WEIGHT: 184 LBS

## 2017-10-24 DIAGNOSIS — Z23 NEED FOR INFLUENZA VACCINATION: ICD-10-CM

## 2017-10-24 DIAGNOSIS — R00.2 PALPITATIONS: ICD-10-CM

## 2017-10-24 DIAGNOSIS — F41.1 GAD (GENERALIZED ANXIETY DISORDER): ICD-10-CM

## 2017-10-24 PROCEDURE — 99214 OFFICE O/P EST MOD 30 MIN: CPT | Performed by: FAMILY MEDICINE

## 2017-10-24 RX ORDER — DIAZEPAM 2 MG/1
2 TABLET ORAL EVERY 12 HOURS PRN
Qty: 60 TAB | Refills: 0 | Status: SHIPPED | OUTPATIENT
Start: 2018-01-04 | End: 2018-01-24

## 2017-10-24 RX ORDER — DIAZEPAM 2 MG/1
2 TABLET ORAL EVERY 12 HOURS PRN
Qty: 60 TAB | Refills: 0 | Status: SHIPPED | OUTPATIENT
Start: 2017-12-05 | End: 2017-10-24 | Stop reason: SDUPTHER

## 2017-10-24 RX ORDER — DIAZEPAM 2 MG/1
2 TABLET ORAL EVERY 12 HOURS PRN
Qty: 60 TAB | Refills: 0 | Status: SHIPPED | OUTPATIENT
Start: 2017-11-05 | End: 2017-10-24 | Stop reason: SDUPTHER

## 2017-10-24 RX ORDER — CITALOPRAM 40 MG/1
40 TABLET ORAL DAILY
Qty: 90 TAB | Refills: 3 | Status: SHIPPED | OUTPATIENT
Start: 2017-10-24 | End: 2018-02-27

## 2017-10-24 NOTE — PATIENT INSTRUCTIONS
[facility]al [facility] -- [place] --> CALL FOR APPOINTMENT FOR PSYCHOLOGY/THERAPY  Address: [address] [place] [address] [address], [place] [address]  Phone: [phone]

## 2017-10-24 NOTE — ASSESSMENT & PLAN NOTE
Patient states that symptoms are generally well controlled on the combination of Celexa 40 mg by mouth daily as well as Valium 2 milligrams by mouth twice a day. Patient is taking this Valium twice daily everyday, states that he generally is experiencing improved control over his anxiety, having less frequent anxiety attacks. However, he still continues to have palpitations on a daily basis.     Of note, review of records reveals a patient both had an echocardiogram in 2014 which was within normal limits, as well as an EKG in the office and the three-day Holter monitor within the last calendar year (both in the May to June timeline). In office EKG as well as three-day Holter monitor did not reveal anything significant, although ultra monitor did reveal one episode of PVC, and 3 episodes of PACs.    Patient and I discussed that solitary episodes of PACs or PVCs are on concerning, however if he has multiple episodes in a row as he has in the past, and if this happens more regularly, then he should present for reevaluation for possible ventricular tachycardia or atrial fibrillation.    ROS is NEGATIVE for dizziness, generalized weakness/fatigue, cold sweats, dizziness,  vision/hearing changes, jaw pain/paresthesias, BUE pain/paresthesias/numbness/weakness, chest pain/pressure, palpitations, dyspnea, nausea, RUQ abdominal pain, oliguria/anuria, BLE edema.

## 2017-11-15 NOTE — PROGRESS NOTES
Subjective:   Chief Complaint/History of Present Illness:  Geremias Conte is a 26 y.o. male established patient who presents today to discuss management of the following medical conditions:    OPAL (generalized anxiety disorder)  Patient states that symptoms are generally well controlled on the combination of Celexa 40 mg by mouth daily as well as Valium 2 milligrams by mouth twice a day. Patient is taking this Valium twice daily everyday, states that he generally is experiencing improved control over his anxiety, having less frequent anxiety attacks. However, he still continues to have palpitations on a daily basis.     Of note, review of records reveals a patient both had an echocardiogram in 2014 which was within normal limits, as well as an EKG in the office and the three-day Holter monitor within the last calendar year (both in the May to June timeline). In office EKG as well as three-day Holter monitor did not reveal anything significant, although ultra monitor did reveal one episode of PVC, and 3 episodes of PACs.    Patient and I discussed that solitary episodes of PACs or PVCs are on concerning, however if he has multiple episodes in a row as he has in the past, and if this happens more regularly, then he should present for reevaluation for possible ventricular tachycardia or atrial fibrillation.    ROS is NEGATIVE for dizziness, generalized weakness/fatigue, cold sweats, dizziness,  vision/hearing changes, jaw pain/paresthesias, BUE pain/paresthesias/numbness/weakness, chest pain/pressure, palpitations, dyspnea, nausea, RUQ abdominal pain, oliguria/anuria, BLE edema.    Palpitations  Please see notes from same date of service 10/24/2017 Re: Generalized anxiety disorder.      Patient Active Problem List    Diagnosis Date Noted   • Inappropriate sinus tachycardia 08/08/2014     Priority: Medium   • OPAL (generalized anxiety disorder) 06/23/2017   • Mixed hyperlipidemia 06/12/2017   • Palpitations 05/16/2017   •  "Dyspepsia 08/05/2016       Additional History:   Allergies:    Lexapro     Current Medications:     Current Outpatient Prescriptions   Medication Sig Dispense Refill   • citalopram (CELEXA) 40 MG Tab Take 1 Tab by mouth every day. 90 Tab 3   • [START ON 1/4/2018] diazepam (VALIUM) 2 MG Tab Take 1 Tab by mouth every 12 hours as needed for up to 30 days. FOR ANXIETY 60 Tab 0   • omeprazole (PRILOSEC) 20 MG delayed-release capsule Take 1 Cap by mouth every day. 30 Cap 2     No current facility-administered medications for this visit.         Social History:     Social History   Substance Use Topics   • Smoking status: Never Smoker   • Smokeless tobacco: Never Used   • Alcohol use 7.2 oz/week     12 Cans of beer per week      Comment: weekends        ROS:     - NOTE: All other systems reviewed and are negative, except as in HPI.     Objective:   Physical Exam:    Vitals: Blood pressure 130/99, pulse 78, temperature 36.8 °C (98.2 °F), resp. rate 14, height 1.753 m (5' 9\"), weight 83.5 kg (184 lb), SpO2 95 %.   BMI: Body mass index is 27.17 kg/m².   General/Constitutional: Vitals as above, Well nourished, well developed male in no acute distress   Head/Eyes: Head is grossly normal & atraumatic, bilateral conjunctivae clear and not injected, bilateral EOMI, bilateral PERRL   ENT: Bilateral external ears grossly normal in appearance, Hearing grossly intact, External nares normal in appearance and without discharge/bleeding   Respiratory: No respiratory distress, bilateral lungs are clear to ausculation in all lung fields (anterior/lateral/posterior), no wheezing/rhonchi/rales   Cardiovascular: Regular rate and rhythm without murmur/gallops/rubs, distal pulses are intact and equal bilaterally (radial, posterior tibial), no bilateral lower extremity edema   MSK: Gait grossly normal & not antalgic   Integumentary: No apparent rashes   Psych: Judgment grossly appropriate, mild apparent anxiety    Health Maintenance:     - " Declines flu vaccination    Assessment and Plan:   1. OPAL (generalized anxiety disorder)  2. Palpitations  Stable, well-controlled.  Continue Celexa with instructions to decrease usage of Valium over time. Patient to schedule appointment with [facility].   - citalopram (CELEXA) 40 MG Tab; Take 1 Tab by mouth every day.  Dispense: 90 Tab; Refill: 3   - diazepam (VALIUM) 2 MG Tab; Take 1 Tab by mouth every 12 hours as needed for up to 30 days. FOR ANXIETY  Dispense: 60 Tab; Refill: 0    3. Need for influenza vaccination  Uncontrolled. However patient declines vaccination present time. We discussed good hand hygiene practices as well as general infection control practices that patient can perform to decrease risk of influenza infection.    RTC: in 3months for anxiety & palpitations follow-up.    PLEASE NOTE: This dictation was created using voice recognition software. I have made every reasonable attempt to correct obvious errors, but I expect that there are errors of grammar and possibly content that I did not discover before finalizing the note.

## 2018-01-24 ENCOUNTER — OFFICE VISIT (OUTPATIENT)
Dept: MEDICAL GROUP | Facility: MEDICAL CENTER | Age: 28
End: 2018-01-24
Payer: COMMERCIAL

## 2018-01-24 VITALS
HEART RATE: 68 BPM | OXYGEN SATURATION: 98 % | DIASTOLIC BLOOD PRESSURE: 98 MMHG | WEIGHT: 200.2 LBS | HEIGHT: 69 IN | RESPIRATION RATE: 14 BRPM | SYSTOLIC BLOOD PRESSURE: 130 MMHG | BODY MASS INDEX: 29.65 KG/M2 | TEMPERATURE: 98.2 F

## 2018-01-24 DIAGNOSIS — I47.11 INAPPROPRIATE SINUS TACHYCARDIA: Chronic | ICD-10-CM

## 2018-01-24 DIAGNOSIS — F41.1 GAD (GENERALIZED ANXIETY DISORDER): ICD-10-CM

## 2018-01-24 PROCEDURE — 99214 OFFICE O/P EST MOD 30 MIN: CPT | Performed by: FAMILY MEDICINE

## 2018-01-24 RX ORDER — ALPRAZOLAM 0.5 MG/1
0.5 TABLET ORAL NIGHTLY PRN
Qty: 30 TAB | Refills: 0 | Status: SHIPPED | OUTPATIENT
Start: 2018-01-24 | End: 2018-02-22 | Stop reason: SDUPTHER

## 2018-01-24 NOTE — ASSESSMENT & PLAN NOTE
Chronic, uncontrolled, patient states that overall he is taking less volume, however feels that he sleeps to volume approximately once per day, however 4 times a week. He states typically occur on weekends, when he is at work, he does not have to take volume of his days off on the weekends.    Of note, patient did formerly have palpitations, with evaluation with Holter monitor, Holter monitor evaluation did not reveal any persistent abnormal arrhythmia. However, patient did have sinus tachycardia for approximately 9 hours, as well as sinus bradycardia for approximately 4 hours.    Panic attacks described as tachycardia    ROS is NEGATIVE for generalized weakness/fatigue, dizziness, vision/hearing changes, chest pain/pressure, palpitations, dyspnea, tachypnea, intense feelings of dread, insomnia, decreased appetite, persistent nausea.

## 2018-01-25 ENCOUNTER — OFFICE VISIT (OUTPATIENT)
Dept: URGENT CARE | Facility: CLINIC | Age: 28
End: 2018-01-25

## 2018-01-25 ENCOUNTER — NON-PROVIDER VISIT (OUTPATIENT)
Dept: URGENT CARE | Facility: CLINIC | Age: 28
End: 2018-01-25

## 2018-01-25 DIAGNOSIS — Z01.89 ENCOUNTER FOR RESPIRATORY CLEARANCE EXAMINATION: ICD-10-CM

## 2018-01-25 DIAGNOSIS — Z01.89 RESPIRATORY CLEARANCE EXAMINATION, ENCOUNTER FOR: Primary | ICD-10-CM

## 2018-01-25 PROCEDURE — 94375 RESPIRATORY FLOW VOLUME LOOP: CPT | Performed by: PHYSICIAN ASSISTANT

## 2018-01-25 PROCEDURE — 8916 PR CLEARANCE ONLY: Performed by: PHYSICIAN ASSISTANT

## 2018-01-25 NOTE — ASSESSMENT & PLAN NOTE
Acute exacerbation of chronic condition, uncontrolled.  This may be triggered by his anxiety.  Patient states that this isn't being controlled by current medications, occurs more often when he is acutely stressed, that although valium helps a little bit, it makes him feel very drowsy afterwards and he still has the palpitations.    ROS is NEGATIVE for dizziness, generalized weakness/fatigue, cold sweats, dizziness,  vision/hearing changes, jaw pain/paresthesias, BUE pain/paresthesias/numbness/weakness, chest pain/pressure, palpitations, dyspnea, nausea, RUQ abdominal pain, oliguria/anuria, BLE edema.

## 2018-01-25 NOTE — PROGRESS NOTES
Subjective:   Chief Complaint/History of Present Illness:  Geremias Conte is a 27 y.o. male established patient who presents today to discuss the following medical conditions:    OPAL (generalized anxiety disorder)  Chronic, uncontrolled, patient states that overall he is taking less volume, however feels that he sleeps to volume approximately once per day, however 4 times a week. He states typically occur on weekends, when he is at work, he does not have to take volume of his days off on the weekends.    Of note, patient did formerly have palpitations, with evaluation with Holter monitor, Holter monitor evaluation did not reveal any persistent abnormal arrhythmia. However, patient did have sinus tachycardia for approximately 9 hours, as well as sinus bradycardia for approximately 4 hours.    Panic attacks described as tachycardia    ROS is NEGATIVE for generalized weakness/fatigue, dizziness, vision/hearing changes, chest pain/pressure, palpitations, dyspnea, tachypnea, intense feelings of dread, insomnia, decreased appetite, persistent nausea.    Inappropriate sinus tachycardia  Acute exacerbation of chronic condition, uncontrolled.  This may be triggered by his anxiety.  Patient states that this isn't being controlled by current medications, occurs more often when he is acutely stressed, that although valium helps a little bit, it makes him feel very drowsy afterwards and he still has the palpitations.    ROS is NEGATIVE for dizziness, generalized weakness/fatigue, cold sweats, dizziness,  vision/hearing changes, jaw pain/paresthesias, BUE pain/paresthesias/numbness/weakness, chest pain/pressure, palpitations, dyspnea, nausea, RUQ abdominal pain, oliguria/anuria, BLE edema.      Patient Active Problem List    Diagnosis Date Noted   • Inappropriate sinus tachycardia 08/08/2014     Priority: Medium   • OPAL (generalized anxiety disorder) 06/23/2017   • Mixed hyperlipidemia 06/12/2017   • Palpitations 05/16/2017   •  "Dyspepsia 08/05/2016       Additional History:   Allergies:    Lexapro     Current Medications:     Current Outpatient Prescriptions   Medication Sig Dispense Refill   • alprazolam (XANAX) 0.5 MG Tab Take 1 Tab by mouth at bedtime as needed for Sleep for up to 30 days. 30 Tab 0   • citalopram (CELEXA) 40 MG Tab Take 1 Tab by mouth every day. 90 Tab 3   • omeprazole (PRILOSEC) 20 MG delayed-release capsule Take 1 Cap by mouth every day. 30 Cap 2     No current facility-administered medications for this visit.         Social History:     Social History   Substance Use Topics   • Smoking status: Never Smoker   • Smokeless tobacco: Never Used   • Alcohol use 7.2 oz/week     12 Cans of beer per week      Comment: weekends        ROS:     - NOTE: All other systems reviewed and are negative, except as in HPI.     Objective:   Physical Exam:    Vitals: Blood pressure 130/98, pulse 68, temperature 36.8 °C (98.2 °F), resp. rate 14, height 1.753 m (5' 9\"), weight 90.8 kg (200 lb 3.2 oz), SpO2 98 %.   BMI: Body mass index is 29.56 kg/m².   General/Constitutional: Vitals as above, Well nourished, well developed male in no acute distress   Head/Eyes: Head is grossly normal & atraumatic, bilateral conjunctivae clear and not injected, bilateral EOMI, bilateral PERRL   ENT: Bilateral external ears grossly normal in appearance, Hearing grossly intact, External nares normal in appearance and without discharge/bleeding   Respiratory: No respiratory distress, bilateral lungs are clear to ausculation in all lung fields (anterior/lateral/posterior), no wheezing/rhonchi/rales   Cardiovascular: Regular rate and rhythm without murmur/gallops/rubs, distal pulses are intact and equal bilaterally (radial, posterior tibial), no bilateral lower extremity edema   MSK: Gait grossly normal & not antalgic   Integumentary: No apparent rashes   Psych: Judgment grossly appropriate, mild apparent anxiety    Health Maintenance:     - Not addressed at this " time    Imaging/Labs:     - Recent holter monitor evaluation reviewed with patient (05/2017), that this was not positive for significant arrhythmias, but did have mild abnormalities.    Assessment and Plan:   1. OPAL (generalized anxiety disorder)  Chronic, uncontrolled but improving.  Continue use of Celexa, but change benzo from Valium to Xanax.  Consider changing Celexa to Effexor or other alternative in the future if Xanax proves beneficial towards controlling exacerbations of stress/anxiety.  Cannot increase Celexa d/t risk of causing QT prolongation and/or other arrhythmias.   - alprazolam (XANAX) 0.5 MG Tab; Take 1 Tab by mouth at bedtime as needed for Sleep for up to 30 days.  Dispense: 30 Tab; Refill: 0    2. Inappropriate sinus tachycardia  Acute exacerbation of chronic condition.  Likely brought on by exacerbations of anxiety.  Change benzo as above.  Review of [employer] shows that patient is filling this appropriately.   - alprazolam (XANAX) 0.5 MG Tab; Take 1 Tab by mouth at bedtime as needed for Sleep for up to 30 days.  Dispense: 30 Tab; Refill: 0        RTC: in 1month for followup .    PLEASE NOTE: This dictation was created using voice recognition software. I have made every reasonable attempt to correct obvious errors, but I expect that there are errors of grammar and possibly content that I did not discover before finalizing the note.

## 2018-02-22 DIAGNOSIS — I47.11 INAPPROPRIATE SINUS TACHYCARDIA: Chronic | ICD-10-CM

## 2018-02-22 DIAGNOSIS — F41.1 GAD (GENERALIZED ANXIETY DISORDER): ICD-10-CM

## 2018-02-22 NOTE — TELEPHONE ENCOUNTER
From: Geremias Conte  Sent: 2/22/2018 1:38 PM PST  Subject: Medication Renewal Request    Geremias Conte would like a refill of the following medications:     alprazolam (XANAX) 0.5 MG Tab [Bro Mcdaniel M.D.]    Preferred pharmacy: [facility] #[id number] - [place] - [address]    Comment:

## 2018-02-23 NOTE — TELEPHONE ENCOUNTER
Radha reveals appropriate filling, patient has clinic visit with me on 02/27.  As I am out of office, I will forward this request to my partners, in clinic, for 1month refill only.

## 2018-02-25 RX ORDER — ALPRAZOLAM 0.5 MG/1
0.5 TABLET ORAL NIGHTLY PRN
Qty: 30 TAB | Refills: 0 | Status: SHIPPED | OUTPATIENT
Start: 2018-02-25 | End: 2018-02-27 | Stop reason: SDUPTHER

## 2018-02-27 ENCOUNTER — OFFICE VISIT (OUTPATIENT)
Dept: MEDICAL GROUP | Facility: MEDICAL CENTER | Age: 28
End: 2018-02-27
Payer: COMMERCIAL

## 2018-02-27 VITALS
TEMPERATURE: 99.1 F | DIASTOLIC BLOOD PRESSURE: 78 MMHG | OXYGEN SATURATION: 98 % | SYSTOLIC BLOOD PRESSURE: 120 MMHG | HEIGHT: 69 IN | HEART RATE: 72 BPM | BODY MASS INDEX: 29.62 KG/M2 | WEIGHT: 200 LBS

## 2018-02-27 DIAGNOSIS — I47.11 INAPPROPRIATE SINUS TACHYCARDIA: Chronic | ICD-10-CM

## 2018-02-27 DIAGNOSIS — E66.3 OVERWEIGHT (BMI 25.0-29.9): ICD-10-CM

## 2018-02-27 DIAGNOSIS — F41.1 GAD (GENERALIZED ANXIETY DISORDER): ICD-10-CM

## 2018-02-27 PROCEDURE — 99214 OFFICE O/P EST MOD 30 MIN: CPT | Performed by: FAMILY MEDICINE

## 2018-02-27 RX ORDER — ALPRAZOLAM 0.5 MG/1
0.5 TABLET ORAL
Qty: 30 TAB | Refills: 0 | Status: SHIPPED | OUTPATIENT
Start: 2018-02-27 | End: 2018-03-26 | Stop reason: SDUPTHER

## 2018-02-27 RX ORDER — VENLAFAXINE HYDROCHLORIDE 75 MG/1
75 CAPSULE, EXTENDED RELEASE ORAL DAILY
Qty: 30 CAP | Refills: 0 | Status: SHIPPED | OUTPATIENT
Start: 2018-02-27 | End: 2018-03-26 | Stop reason: SDUPTHER

## 2018-02-28 NOTE — ASSESSMENT & PLAN NOTE
Chronic, controlled, however improved on citalopram 40 mg by mouth daily as well as Xanax 0.5 mg by mouth daily, which he typically takes during the day, however can take at night as well. Patient never takes the Xanax more than once per day.    While patient has experienced some improvement of his symptoms on citalopram, is not fully controlled by Celexa alone as he continues to take the Xanax.    Overall, patient states that his various stressors have not changed, main stressor being work at [employer]    ROS is NEGATIVE for generalized weakness/fatigue, dizziness, vision/hearing changes, chest pain/pressure, palpitations, dyspnea, tachypnea, intense feelings of dread, insomnia, decreased appetite, persistent nausea.

## 2018-02-28 NOTE — PROGRESS NOTES
Subjective:   Chief Complaint/History of Present Illness:  Geremias Conte is a 27 y.o. male established patient who presents today to discuss management of OPAL & sinus tachycardia, and to discuss overweight:    OPAL (generalized anxiety disorder)  Chronic, controlled, however improved on citalopram 40 mg by mouth daily as well as Xanax 0.5 mg by mouth daily, which he typically takes during the day, however can take at night as well. Patient never takes the Xanax more than once per day.    While patient has experienced some improvement of his symptoms on citalopram, is not fully controlled by Celexa alone as he continues to take the Xanax.    Overall, patient states that his various stressors have not changed, main stressor being work at [employer].    ROS is NEGATIVE for generalized weakness/fatigue, dizziness, vision/hearing changes, chest pain/pressure, palpitations, dyspnea, tachypnea, intense feelings of dread, insomnia, decreased appetite, persistent nausea.    Inappropriate sinus tachycardia  Chronic, uncontrolled, intermittently active and can trigger OPAL at times.  Please see notes from same date of service 2/27/2018 Re: Generalize anxiety disorder.    Overweight (BMI 25.0-29.9)  Chronic, uncontrolled, stable.  Patient is not actively working on losing weight.    ROS is NEGATIVE for: cold or heat intolerance, anxiety/depression, chest pain/pressure, palpitations, hair thickening/coarsening/falling out/thinning, skin changes, diarrhea/constipation, unexpected weight change.    ROS is NEGATIVE for blurred vision, polydipsia, polyuria, diaphoresis, palpitations, fatigue, irritability, flank pain, BLE paresthesias.      Patient Active Problem List    Diagnosis Date Noted   • Inappropriate sinus tachycardia 08/08/2014     Priority: Medium   • Overweight (BMI 25.0-29.9) 02/27/2018   • OPAL (generalized anxiety disorder) 06/23/2017   • Mixed hyperlipidemia 06/12/2017   • Palpitations 05/16/2017   • Dyspepsia 08/05/2016  "      Additional History:   Allergies:    Lexapro     Current Medications:     Current Outpatient Prescriptions   Medication Sig Dispense Refill   • ALPRAZolam (XANAX) 0.5 MG Tab Take 1 Tab by mouth 1 time daily as needed for Anxiety for up to 30 days. 30 Tab 0   • venlafaxine XR (EFFEXOR XR) 75 MG CAPSULE SR 24 HR Take 1 Cap by mouth every day. 30 Cap 0   • omeprazole (PRILOSEC) 20 MG delayed-release capsule Take 1 Cap by mouth every day. 30 Cap 2     No current facility-administered medications for this visit.         Social History:     Social History   Substance Use Topics   • Smoking status: Never Smoker   • Smokeless tobacco: Never Used   • Alcohol use 7.2 oz/week     12 Cans of beer per week      Comment: mostly weekends (5-20beers; 12pack on avg)       ROS:     - NOTE: All other systems reviewed and are negative, except as in HPI.     Objective:   Physical Exam:    Vitals: Blood pressure 120/78, pulse 72, temperature 37.3 °C (99.1 °F), height 1.753 m (5' 9\"), weight 90.7 kg (200 lb), SpO2 98 %.   BMI: Body mass index is 29.53 kg/m².   General/Constitutional: Vitals as above, Well nourished, well developed male in no acute distress   Head/Eyes: Head is grossly normal & atraumatic, bilateral conjunctivae clear and not injected, bilateral EOMI, bilateral PERRL   ENT: Bilateral external ears grossly normal in appearance, Hearing grossly intact, External nares normal in appearance and without discharge/bleeding   Respiratory: No respiratory distress, bilateral lungs are clear to ausculation in all lung fields (anterior/lateral/posterior), no wheezing/rhonchi/rales   Cardiovascular: Regular rate and rhythm without murmur/gallops/rubs, distal pulses are intact and equal bilaterally (radial, posterior tibial), no bilateral lower extremity edema   MSK: Gait grossly normal & not antalgic   Integumentary: No apparent rashes   Psych: Judgment grossly appropriate, mild apparent anxiety    Health Maintenance:     - Not " addressed at this visit    Imaging/Labs:     - Not addressed at this visit    Assessment and Plan:   1. OPAL (generalized anxiety disorder)  Chronic, uncontrolled   A) Medication management: We discussed mechanism of action and pharmacokinetics of venlafaxine, as well as the long-term goal of weaning patient off of Xanax, and potentially getting him off of long-acting agent as well (venlafaxine).     - ALPRAZolam (XANAX) 0.5 MG Tab; Take 1 Tab by mouth 1 time daily as needed for Anxiety for up to 30 days.  Dispense: 30 Tab; Refill: 0    - venlafaxine XR (EFFEXOR XR) 75 MG CAPSULE SR 24 HR; Take 1 Cap by mouth every day.  Dispense: 30 Cap; Refill: 0   B) Alcohol management: Patient to also work on decreasing alcohol intake over time.    2. Inappropriate sinus tachycardia  Chronic, uncontrolled, Xanax for as needed usage.   - ALPRAZolam (XANAX) 0.5 MG Tab; Take 1 Tab by mouth 1 time daily as needed for Anxiety for up to 30 days.  Dispense: 30 Tab; Refill: 0    3. Overweight (BMI 25.0-29.9)  Chronic, uncontrolled, patient to work on lifestyle changes.    RTC: in 4weeks for Anxiety management.    PLEASE NOTE: This dictation was created using voice recognition software. I have made every reasonable attempt to correct obvious errors, but I expect that there are errors of grammar and possibly content that I did not discover before finalizing the note.

## 2018-02-28 NOTE — ASSESSMENT & PLAN NOTE
Chronic, uncontrolled, intermittently active and can trigger OPAL at times.  Please see notes from same date of service 2/27/2018 Re: Generalize anxiety disorder.

## 2018-02-28 NOTE — ASSESSMENT & PLAN NOTE
Chronic, uncontrolled, stable.  Patient is not actively working on losing weight.    ROS is NEGATIVE for: cold or heat intolerance, anxiety/depression, chest pain/pressure, palpitations, hair thickening/coarsening/falling out/thinning, skin changes, diarrhea/constipation, unexpected weight change.    ROS is NEGATIVE for blurred vision, polydipsia, polyuria, diaphoresis, palpitations, fatigue, irritability, flank pain, BLE paresthesias.

## 2018-03-26 DIAGNOSIS — F41.1 GAD (GENERALIZED ANXIETY DISORDER): ICD-10-CM

## 2018-03-26 DIAGNOSIS — I47.11 INAPPROPRIATE SINUS TACHYCARDIA: Chronic | ICD-10-CM

## 2018-03-27 RX ORDER — ALPRAZOLAM 0.5 MG/1
0.5 TABLET ORAL
Qty: 30 TAB | Refills: 0 | Status: SHIPPED | OUTPATIENT
Start: 2018-03-27 | End: 2018-04-02 | Stop reason: SDUPTHER

## 2018-03-27 RX ORDER — VENLAFAXINE HYDROCHLORIDE 75 MG/1
75 CAPSULE, EXTENDED RELEASE ORAL DAILY
Qty: 30 CAP | Refills: 0 | Status: SHIPPED | OUTPATIENT
Start: 2018-03-27 | End: 2018-04-02 | Stop reason: SDUPTHER

## 2018-03-27 NOTE — TELEPHONE ENCOUNTER
From: Geremias Conte  Sent: 3/26/2018 7:04 PM PDT  Subject: Medication Renewal Request    Geremias Conte would like a refill of the following medications:     ALPRAZolam (XANAX) 0.5 MG Tab [Bro Mcdaniel M.D.]     venlafaxine XR (EFFEXOR XR) 75 MG CAPSULE SR 24 HR [Bro Mcdaniel M.D.]    Preferred pharmacy: [id number] - [place], [place] - [address]    Comment:

## 2018-04-02 ENCOUNTER — HOSPITAL ENCOUNTER (OUTPATIENT)
Facility: MEDICAL CENTER | Age: 28
End: 2018-04-02
Attending: FAMILY MEDICINE
Payer: COMMERCIAL

## 2018-04-02 ENCOUNTER — OFFICE VISIT (OUTPATIENT)
Dept: MEDICAL GROUP | Facility: MEDICAL CENTER | Age: 28
End: 2018-04-02
Payer: COMMERCIAL

## 2018-04-02 VITALS
BODY MASS INDEX: 30.07 KG/M2 | DIASTOLIC BLOOD PRESSURE: 88 MMHG | OXYGEN SATURATION: 97 % | HEART RATE: 79 BPM | WEIGHT: 203 LBS | HEIGHT: 69 IN | SYSTOLIC BLOOD PRESSURE: 126 MMHG | TEMPERATURE: 98.8 F

## 2018-04-02 DIAGNOSIS — E66.3 OVERWEIGHT (BMI 25.0-29.9): ICD-10-CM

## 2018-04-02 DIAGNOSIS — R00.2 PALPITATIONS: ICD-10-CM

## 2018-04-02 DIAGNOSIS — F41.1 GAD (GENERALIZED ANXIETY DISORDER): Primary | ICD-10-CM

## 2018-04-02 DIAGNOSIS — F41.1 GAD (GENERALIZED ANXIETY DISORDER): ICD-10-CM

## 2018-04-02 DIAGNOSIS — I47.11 INAPPROPRIATE SINUS TACHYCARDIA: Chronic | ICD-10-CM

## 2018-04-02 LAB
AMBIGUOUS DTTM AMBI4: NORMAL
AMPHET UR QL SCN: NEGATIVE
BARBITURATES UR QL SCN: NEGATIVE
BENZODIAZ UR QL SCN: POSITIVE
BZE UR QL SCN: NEGATIVE
CANNABINOIDS UR QL SCN: NEGATIVE
METHADONE UR QL SCN: NEGATIVE
OPIATES UR QL SCN: NEGATIVE
OXYCODONE UR QL SCN: NEGATIVE
PCP UR QL SCN: NEGATIVE
PROPOXYPH UR QL SCN: NEGATIVE

## 2018-04-02 PROCEDURE — 80307 DRUG TEST PRSMV CHEM ANLYZR: CPT

## 2018-04-02 PROCEDURE — 99214 OFFICE O/P EST MOD 30 MIN: CPT | Performed by: FAMILY MEDICINE

## 2018-04-02 RX ORDER — VENLAFAXINE HYDROCHLORIDE 75 MG/1
150 CAPSULE, EXTENDED RELEASE ORAL DAILY
Qty: 180 CAP | Refills: 0 | Status: SHIPPED | OUTPATIENT
Start: 2018-04-02 | End: 2018-05-30 | Stop reason: SDUPTHER

## 2018-04-02 RX ORDER — ALPRAZOLAM 0.5 MG/1
0.5 TABLET ORAL
Qty: 30 TAB | Refills: 0 | Status: SHIPPED | OUTPATIENT
Start: 2018-05-26 | End: 2018-04-02 | Stop reason: SDUPTHER

## 2018-04-02 RX ORDER — ALPRAZOLAM 0.5 MG/1
0.5 TABLET ORAL
Qty: 30 TAB | Refills: 0 | Status: SHIPPED | OUTPATIENT
Start: 2018-04-26 | End: 2018-04-02 | Stop reason: SDUPTHER

## 2018-04-02 RX ORDER — ALPRAZOLAM 0.5 MG/1
0.5 TABLET ORAL
Qty: 30 TAB | Refills: 0 | Status: SHIPPED | OUTPATIENT
Start: 2018-06-25 | End: 2018-07-25

## 2018-04-02 NOTE — PROGRESS NOTES
Subjective:   Chief Complaint/History of Present Illness:  Geremias Conte is a 27 y.o. male established patient who presents today to discuss medical problems as listed below:    The primary encounter diagnosis was OPAL (generalized anxiety disorder). Diagnoses of Inappropriate sinus tachycardia, Palpitations, and Overweight (BMI 25.0-29.9) were also pertinent to this visit.    OPAL (generalized anxiety disorder)  Chronic, uncontrolled, however improving. Patient is taking Xanax less frequently than previously, currently on average of about once every day or every other day.    Patient is concerned that he is still having palpitations which can trigger his anxiety, at those times, he may be taking Xanax or trying to wait it out. However, there are times when either of these options do not resolve the palpitations.    We reviewed previous cardiac workup, that point of care EKG as well as the 48 hour Holter monitor were both largely negative for pathologic arrhythmias. However, it is still possible that patient may have adventitious arrhythmia that was otherwise undetected on previous examinations.    ROS is NEGATIVE for dizziness, generalized weakness/fatigue, cold sweats, dizziness,  vision/hearing changes, jaw pain/paresthesias, BUE pain/paresthesias/numbness/weakness, chest pain/pressure, palpitations, dyspnea, nausea, RUQ abdominal pain, oliguria/anuria, BLE edema.    Inappropriate sinus tachycardia  Chronic, uncontrolled, this was the diagnosis previously given to patient and discussing her last visit on 2/27/2018. Please see notes from same date of service 04/02/18, as well, regarding anxiety.    Palpitations  Chronic, uncontrolled. Please see notes from same date of service 04/02/18, as well, regarding anxiety.    Overweight (BMI 25.0-29.9)  Chronic, uncontrolled, patient missed that he in this week by any excessive calories, and has not had a regular cardiovascular exercise routine. We discussed how regular  "cardiovascular exercise routine could get his body more attenuated to fluctuations in pulse, and therefore he did get better used to these changes.    ROS is NEGATIVE for: cold or heat intolerance, anxiety/depression, chest pain/pressure, palpitations, hair thickening/coarsening/falling out/thinning, skin changes, diarrhea/constipation, unexpected weight change.     ROS is NEGATIVE for blurred vision, polydipsia, polyuria, diaphoresis, palpitations, fatigue, irritability, flank pain, BLE paresthesias.       Patient Active Problem List    Diagnosis Date Noted   • Inappropriate sinus tachycardia 08/08/2014     Priority: Medium   • Overweight (BMI 25.0-29.9) 02/27/2018   • OPAL (generalized anxiety disorder) 06/23/2017   • Mixed hyperlipidemia 06/12/2017   • Palpitations 05/16/2017   • Dyspepsia 08/05/2016       Additional History:   Allergies:    Lexapro     Current Medications:     Current Outpatient Prescriptions   Medication Sig Dispense Refill   • venlafaxine XR (EFFEXOR XR) 75 MG CAPSULE SR 24 HR Take 2 Caps by mouth every day. 180 Cap 0   • [START ON 6/25/2018] ALPRAZolam (XANAX) 0.5 MG Tab Take 1 Tab by mouth 1 time daily as needed for Anxiety for up to 30 days. 30 Tab 0   • omeprazole (PRILOSEC) 20 MG delayed-release capsule Take 1 Cap by mouth every day. 30 Cap 2     No current facility-administered medications for this visit.         Social History:     Social History   Substance Use Topics   • Smoking status: Never Smoker   • Smokeless tobacco: Never Used   • Alcohol use 7.2 oz/week     12 Cans of beer per week      Comment: mostly weekends (5-20beers; 12pack on avg)       ROS:     - NOTE: All other systems reviewed and are negative, except as in HPI.     Objective:   Physical Exam:    Vitals: Blood pressure 126/88, pulse 79, temperature 37.1 °C (98.8 °F), height 1.753 m (5' 9\"), weight 92.1 kg (203 lb), SpO2 97 %.   BMI: Body mass index is 29.98 kg/m².   General/Constitutional: Vitals as above, Well " nourished, well developed male in no acute distress   Head/Eyes: Head is grossly normal & atraumatic, bilateral conjunctivae clear and not injected, bilateral EOMI, bilateral PERRL   ENT: Bilateral external ears grossly normal in appearance, Hearing grossly intact, External nares normal in appearance and without discharge/bleeding   Respiratory: No respiratory distress, bilateral lungs are clear to ausculation in all lung fields (anterior/lateral/posterior), no wheezing/rhonchi/rales   Cardiovascular: Regular rate and rhythm without murmur/gallops/rubs, distal pulses are intact and equal bilaterally (radial, posterior tibial), no bilateral lower extremity edema   MSK: Gait grossly normal & not antalgic   Integumentary: No apparent rashes   Psych: Judgment grossly appropriate, mild apparent anxiety    Health Maintenance:     - NarxCheck is appropriate    - Urine drug screen will be updated today    Imaging/Labs:     -No new labs to review    Assessment and Plan:   1. OPAL (generalized anxiety disorder)  Chronic, uncontrolled, however improved.    A) Effexor management: Patient to increase his Effexor to 150 mg by mouth daily. Patient informed that should he develop side effects that he thinks may be due to the increased dosage, he can turn back the dosage to 75 mg by mouth daily.    B) Xanax management: Patient to continue Xanax on a when necessary basis. This has been refilled for 3 months.    - venlafaxine XR (EFFEXOR XR) 75 MG CAPSULE SR 24 HR; Take 2 Caps by mouth every day.  Dispense: 180 Cap; Refill: 0    - ALPRAZolam (XANAX) 0.5 MG Tab; Take 1 Tab by mouth 1 time daily as needed for Anxiety for up to 30 days.  Dispense: 30 Tab; Refill: 0   C) Health maintenance: UDS per [facility]'s controlled substance protocols.    - URINE DRUG SCREEN; Future    2. Inappropriate sinus tachycardia  3. Palpitations  Chronic, uncontrolled, had previous workup with point-of-care EKG as well as Holter monitor. Holter monitor did  demonstrate sinus tachycardia. Referral to electrophysiologist for further evaluation and management due to patient's concerns for possible pathologic arrhythmia.   - ALPRAZolam (XANAX) 0.5 MG Tab; Take 1 Tab by mouth 1 time daily as needed for Anxiety for up to 30 days.  Dispense: 30 Tab; Refill: 0   - REFERRAL TO CARDIAC ELECTROPHYSIOLOGY   - URINE DRUG SCREEN; Future    4. Overweight (BMI 25.0-29.9)  Chronic, uncontrolled, Patient and I discussed the importance of lifestyle changes, with particular emphasis on plant-based nutrition (for the purposes of weight loss, general health), as well as cardiovascular exercise, proper sleep, and stress management.  This discussion is briefly summarized in the patient instruction section below.  Patient verbalized understanding.        RTC: in 3 months for anxiety management, review of cardiology management, weight management.    PLEASE NOTE: This dictation was created using voice recognition software. I have made every reasonable attempt to correct obvious errors, but I expect that there are errors of grammar and possibly content that I did not discover before finalizing the note.

## 2018-04-02 NOTE — ASSESSMENT & PLAN NOTE
Chronic, uncontrolled, this was the diagnosis previously given to patient and discussing her last visit on 2/27/2018. Please see notes from same date of service 04/02/18, as well, regarding anxiety.

## 2018-04-02 NOTE — ASSESSMENT & PLAN NOTE
Chronic, uncontrolled, patient missed that he in this week by any excessive calories, and has not had a regular cardiovascular exercise routine. We discussed how regular cardiovascular exercise routine could get his body more attenuated to fluctuations in pulse, and therefore he did get better used to these changes.    ROS is NEGATIVE for: cold or heat intolerance, anxiety/depression, chest pain/pressure, palpitations, hair thickening/coarsening/falling out/thinning, skin changes, diarrhea/constipation, unexpected weight change.     ROS is NEGATIVE for blurred vision, polydipsia, polyuria, diaphoresis, palpitations, fatigue, irritability, flank pain, BLE paresthesias.

## 2018-04-02 NOTE — ASSESSMENT & PLAN NOTE
Chronic, uncontrolled. Please see notes from same date of service 04/02/18, as well, regarding anxiety.

## 2018-04-02 NOTE — ASSESSMENT & PLAN NOTE
Chronic, uncontrolled, however improving. Patient is taking Xanax less frequently than previously, currently on average of about once every day or every other day.    Patient is concerned that he is still having palpitations which can trigger his anxiety, at those times, he may be taking Xanax or trying to wait it out. However, there are times when either of these options do not resolve the palpitations.    We reviewed previous cardiac workup, that point of care EKG as well as the 48 hour Holter monitor were both largely negative for pathologic arrhythmias. However, it is still possible that patient may have adventitious arrhythmia that was otherwise undetected on previous examinations.    ROS is NEGATIVE for dizziness, generalized weakness/fatigue, cold sweats, dizziness,  vision/hearing changes, jaw pain/paresthesias, BUE pain/paresthesias/numbness/weakness, chest pain/pressure, palpitations, dyspnea, nausea, RUQ abdominal pain, oliguria/anuria, BLE edema.

## 2018-04-26 DIAGNOSIS — F41.1 GAD (GENERALIZED ANXIETY DISORDER): ICD-10-CM

## 2018-04-26 RX ORDER — VENLAFAXINE HYDROCHLORIDE 75 MG/1
75 CAPSULE, EXTENDED RELEASE ORAL DAILY
Qty: 30 CAP | Refills: 0 | Status: SHIPPED | OUTPATIENT
Start: 2018-04-26 | End: 2018-08-27

## 2018-05-02 ENCOUNTER — OFFICE VISIT (OUTPATIENT)
Dept: CARDIOLOGY | Facility: MEDICAL CENTER | Age: 28
End: 2018-05-02
Payer: COMMERCIAL

## 2018-05-02 VITALS
OXYGEN SATURATION: 96 % | WEIGHT: 202 LBS | HEART RATE: 88 BPM | DIASTOLIC BLOOD PRESSURE: 90 MMHG | SYSTOLIC BLOOD PRESSURE: 130 MMHG | BODY MASS INDEX: 29.92 KG/M2 | HEIGHT: 69 IN

## 2018-05-02 DIAGNOSIS — F10.10 ALCOHOL ABUSE: ICD-10-CM

## 2018-05-02 DIAGNOSIS — R00.2 PALPITATIONS: ICD-10-CM

## 2018-05-02 DIAGNOSIS — I47.11 INAPPROPRIATE SINUS TACHYCARDIA: Primary | ICD-10-CM

## 2018-05-02 DIAGNOSIS — F41.1 GAD (GENERALIZED ANXIETY DISORDER): ICD-10-CM

## 2018-05-02 LAB — EKG IMPRESSION: NORMAL

## 2018-05-02 PROCEDURE — 99203 OFFICE O/P NEW LOW 30 MIN: CPT | Mod: 25 | Performed by: INTERNAL MEDICINE

## 2018-05-02 PROCEDURE — 93000 ELECTROCARDIOGRAM COMPLETE: CPT | Performed by: INTERNAL MEDICINE

## 2018-05-02 NOTE — PROGRESS NOTES
"Arrhythmia Clinic Note (New patient)     DOS: 5/2/2018    Referring physician: Bro Mcdaniel    Chief complaint/Reason for consult: \"inappropriate sinus tachycardia\"    HPI:  Patient is a 27 male with history of \"inappropriate sinus tachycardia\". He says his main symptoms are palpitations that occur lasting about 1 beat or 2-3 beats, a total of no more than a few seconds. He feels his heart racing and sometimes skipping beats. This he says is a large source of his anxiety. Lately has been happening more often. He has eliminated caffeine. Still binge drinks (usually he says 24-36 beers over a weekend). Otherwise does not exercise or stay active. Symptoms sometimes associated with chest discomfort.    ROS (+ highlighted in red):  Constitutional: Fevers/chills/fatigue/weightloss  HEENT: Blurry vision/eye pain/sore throat/hearing loss  Respiratory: Shortness of breath/cough  Cardiovascular: Chest pain/palpitations/edema/orthopnea/syncope  GI: Nausea/vomitting/diarrhea  MSK: Arthralgias/myagias/muscle weakness  Skin: Rash/sores  Neurological: Numbness/tremors/vertigo  Endocrine: Excessive thirst/polyuria/cold intolerance/heat intolerance  Psych: Depression/anxiety    Past Medical History:   Diagnosis Date   • Anxiety 8/8/2014   • Inappropriate sinus tachycardia 8/8/2014       History reviewed. No pertinent surgical history.    Social History     Social History   • Marital status:      Spouse name: N/A   • Number of children: N/A   • Years of education: N/A     Occupational History   • Not on file.     Social History Main Topics   • Smoking status: Never Smoker   • Smokeless tobacco: Never Used   • Alcohol use 7.2 oz/week     12 Cans of beer per week      Comment: mostly weekends (5-20beers; 12pack on avg)   • Drug use: No   • Sexual activity: Yes     Partners: Female     Other Topics Concern   • Not on file     Social History Narrative   • No narrative on file       Family History   Problem Relation Age of " "Onset   • Hypertension Mother        Allergies   Allergen Reactions   • Lexapro Unspecified     Made patient more anxious       Current Outpatient Prescriptions   Medication Sig Dispense Refill   • metoprolol (LOPRESSOR) 25 MG Tab Take 1 Tab by mouth 2 times a day. 60 Tab 3   • venlafaxine XR (EFFEXOR XR) 75 MG CAPSULE SR 24 HR TAKE 1 CAP BY MOUTH EVERY DAY. 30 Cap 0   • venlafaxine XR (EFFEXOR XR) 75 MG CAPSULE SR 24 HR Take 2 Caps by mouth every day. 180 Cap 0   • [START ON 6/25/2018] ALPRAZolam (XANAX) 0.5 MG Tab Take 1 Tab by mouth 1 time daily as needed for Anxiety for up to 30 days. 30 Tab 0   • omeprazole (PRILOSEC) 20 MG delayed-release capsule Take 1 Cap by mouth every day. (Patient not taking: Reported on 5/2/2018) 30 Cap 2     No current facility-administered medications for this visit.        Physical Exam:  Vitals:    05/02/18 1624   BP: 130/90   Pulse: 88   SpO2: 96%   Weight: 91.6 kg (202 lb)   Height: 1.753 m (5' 9\")     General appearance: NAD, conversant   Eyes: anicteric sclerae, moist conjunctivae; no lid-lag; PERRLA  HENT: Atraumatic; oropharynx clear with moist mucous membranes and no mucosal ulcerations; normal hard and soft palate  Neck: Trachea midline; FROM, supple, no thyromegaly or lymphadenopathy  Lungs: CTA, with normal respiratory effort and no intercostal retractions  CV: RRR, no MRGs, no JVD   Abdomen: Soft, non-tender; no masses or HSM  Extremities: No peripheral edema or extremity lymphadenopathy  Skin: Normal temperature, turgor and texture; no rash, ulcers or subcutaneous nodules  Psych: Appropriate affect, alert and oriented to person, place and time    Data:  Labs reviewed:    Prior echo/stress results reviewed:  Normal stress/echo    EKG interpreted by me:  NSR    Prior echo was largely unremarkable    Impression/Plan:  1. Inappropriate sinus tachycardia  EKG   2. OPAL (generalized anxiety disorder)     3. Palpitations     4. Alcohol abuse       -I do not think judging from his " prior monitoring he has IST, which is persistent sinus tachycardia in absence of physiologic stimulus  -His symptoms are more consistent with symptomatic ectopy, either PACs or PVCs  -I reassured him that these were not dangerous  -We can try a trial of B-blockers for these to see if they might improve his sx  -He is satisfied with this plan  -RTC in 3-4 mo    Mohan Diaz MD

## 2018-05-30 DIAGNOSIS — F41.1 GAD (GENERALIZED ANXIETY DISORDER): ICD-10-CM

## 2018-05-30 RX ORDER — VENLAFAXINE HYDROCHLORIDE 75 MG/1
CAPSULE, EXTENDED RELEASE ORAL
Qty: 30 CAP | Refills: 0 | OUTPATIENT
Start: 2018-05-30

## 2018-05-30 RX ORDER — VENLAFAXINE HYDROCHLORIDE 150 MG/1
150 CAPSULE, EXTENDED RELEASE ORAL DAILY
Qty: 90 CAP | Refills: 0 | Status: SHIPPED | OUTPATIENT
Start: 2018-05-30 | End: 2018-08-26 | Stop reason: SDUPTHER

## 2018-06-04 DIAGNOSIS — I10 ESSENTIAL HYPERTENSION: Primary | ICD-10-CM

## 2018-07-30 ENCOUNTER — OFFICE VISIT (OUTPATIENT)
Dept: CARDIOLOGY | Facility: MEDICAL CENTER | Age: 28
End: 2018-07-30
Payer: COMMERCIAL

## 2018-07-30 VITALS
BODY MASS INDEX: 30.51 KG/M2 | DIASTOLIC BLOOD PRESSURE: 74 MMHG | SYSTOLIC BLOOD PRESSURE: 118 MMHG | OXYGEN SATURATION: 97 % | WEIGHT: 206 LBS | HEIGHT: 69 IN | HEART RATE: 86 BPM

## 2018-07-30 DIAGNOSIS — R00.2 PALPITATIONS: Primary | ICD-10-CM

## 2018-07-30 DIAGNOSIS — F41.1 GAD (GENERALIZED ANXIETY DISORDER): ICD-10-CM

## 2018-07-30 DIAGNOSIS — I10 ESSENTIAL HYPERTENSION: ICD-10-CM

## 2018-07-30 PROCEDURE — 99213 OFFICE O/P EST LOW 20 MIN: CPT | Performed by: INTERNAL MEDICINE

## 2018-07-30 NOTE — PROGRESS NOTES
"Arrhythmia Clinic Note (Established patient)    DOS: 7/30/2018    Chief complaint/Reason for consult: F/u PACs    Interval History:  Patient is a 26 yo M with history of palpitations 2/2 to symptomatic ectopy who presents for follow-up. At last visit we started Metop and he has been taking the short acting version once a day. This has helped quite a bit he says. 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 Symptoms are improved. He stopped the binge drinking.    ROS (+ highlighted in red):  Constitutional: Fevers/chills/fatigue/weightloss  Respiratory: Shortness of breath/cough  Cardiovascular: Chest pain/palpitations/edema/orthopnea/syncope    Past Medical History:   Diagnosis Date   • Anxiety 8/8/2014   • Inappropriate sinus tachycardia 8/8/2014       Allergies   Allergen Reactions   • Lexapro Unspecified     Made patient more anxious       Current Outpatient Prescriptions   Medication Sig Dispense Refill   • metoprolol (LOPRESSOR) 25 MG Tab Take 1 Tab by mouth 2 times a day. 180 Tab 5   • venlafaxine XR (EFFEXOR-XR) 150 MG extended-release capsule Take 1 Cap by mouth every day. 90 Cap 0   • omeprazole (PRILOSEC) 20 MG delayed-release capsule Take 1 Cap by mouth every day. 30 Cap 2   • venlafaxine XR (EFFEXOR XR) 75 MG CAPSULE SR 24 HR TAKE 1 CAP BY MOUTH EVERY DAY. 30 Cap 0     No current facility-administered medications for this visit.        Physical Exam:  Vitals:    07/30/18 1515   BP: 118/74   Pulse: 86   SpO2: 97%   Weight: 93.4 kg (206 lb)   Height: 1.753 m (5' 9.02\")     General appearance: NAD, conversant   Eyes: anicteric sclerae, moist conjunctivae; no lid-lag; PERRLA  HENT: Atraumatic; oropharynx clear with moist mucous membranes and no mucosal ulcerations; normal hard and soft palate  Neck: Trachea midline; FROM, supple, no thyromegaly or lymphadenopathy  Lungs: CTA, with normal " respiratory effort and no intercostal retractions  CV: RRR, no MRGs, no JVD  Abdomen: Soft, non-tender; no masses or HSM  Extremities: No peripheral edema or extremity lymphadenopathy  Skin: Normal temperature, turgor and texture; no rash, ulcers or subcutaneous nodules  Psych: Appropriate affect, alert and oriented to person, place and time    Data:  Labs reviewed    Prior echo/stress reviewed    EKG interpreted by me:    NSR    Impression/Plan:  1. Palpitations     2. OPAL (generalized anxiety disorder)     3. Essential hypertension  metoprolol (LOPRESSOR) 25 MG Tab     -Beta-blockers working to control his symptoms  -He says he only takes one a day, some days worse than others  -I told him he could take two a day on worse days  -I refilled this for him  -He can otherwise f/u w his PCP and w/ me PRN    Mohan Diaz MD

## 2018-09-04 ENCOUNTER — OFFICE VISIT (OUTPATIENT)
Dept: MEDICAL GROUP | Facility: MEDICAL CENTER | Age: 28
End: 2018-09-04
Payer: COMMERCIAL

## 2018-09-04 VITALS
HEART RATE: 83 BPM | DIASTOLIC BLOOD PRESSURE: 80 MMHG | SYSTOLIC BLOOD PRESSURE: 110 MMHG | TEMPERATURE: 98.3 F | HEIGHT: 69 IN | OXYGEN SATURATION: 95 % | BODY MASS INDEX: 29.68 KG/M2 | WEIGHT: 200.4 LBS

## 2018-09-04 DIAGNOSIS — F41.1 GAD (GENERALIZED ANXIETY DISORDER): ICD-10-CM

## 2018-09-04 DIAGNOSIS — R00.2 PALPITATIONS: ICD-10-CM

## 2018-09-04 PROCEDURE — 99214 OFFICE O/P EST MOD 30 MIN: CPT | Performed by: FAMILY MEDICINE

## 2018-09-04 RX ORDER — VENLAFAXINE HYDROCHLORIDE 37.5 MG/1
37.5 CAPSULE, EXTENDED RELEASE ORAL DAILY
Qty: 30 CAP | Refills: 3 | Status: SHIPPED | OUTPATIENT
Start: 2018-09-04 | End: 2018-10-04

## 2018-09-04 RX ORDER — ALPRAZOLAM 0.5 MG/1
0.5 TABLET ORAL 3 TIMES DAILY PRN
Qty: 30 TAB | Refills: 0 | Status: SHIPPED | OUTPATIENT
Start: 2018-09-04 | End: 2018-10-04 | Stop reason: SDUPTHER

## 2018-09-04 ASSESSMENT — PATIENT HEALTH QUESTIONNAIRE - PHQ9: CLINICAL INTERPRETATION OF PHQ2 SCORE: 0

## 2018-09-10 NOTE — ASSESSMENT & PLAN NOTE
Chronic, uncontrolled.  Patient had formally self discontinued his Effexor, however has been having to take his Xanax on a regular basis, usually once daily, however sometimes twice per day.  Frequency of anxiety seems to have decreased with improvement in control over palpitations.    Therefore, we discussed pharmacotherapy, that he should restart his Effexor.    Patient without discrete panic attacks.  ROS is NEGATIVE for generalized weakness/fatigue, dizziness, vision/hearing changes, chest pain/pressure, palpitations, dyspnea, tachypnea, intense feelings of dread, insomnia, decreased appetite, persistent nausea.

## 2018-09-10 NOTE — ASSESSMENT & PLAN NOTE
Chronic, stable, well-controlled on Lopressor.  Patient to continue follow-up with electrophysiologist.

## 2018-09-10 NOTE — PROGRESS NOTES
Subjective:   Chief Complaint/History of Present Illness:  Geremias Conte is a 27 y.o. male established patient who presents today to discuss medical problems as listed below    Diagnoses of OPAL (generalized anxiety disorder) and Palpitations were pertinent to this visit.    OPAL (generalized anxiety disorder)  Chronic, uncontrolled.  Patient had formally self discontinued his Effexor, however has been having to take his Xanax on a regular basis, usually once daily, however sometimes twice per day.  Frequency of anxiety seems to have decreased with improvement in control over palpitations.    Therefore, we discussed pharmacotherapy, that he should restart his Effexor.    Patient without discrete panic attacks.  ROS is NEGATIVE for generalized weakness/fatigue, dizziness, vision/hearing changes, chest pain/pressure, palpitations, dyspnea, tachypnea, intense feelings of dread, insomnia, decreased appetite, persistent nausea.     Palpitations  Chronic, stable, well-controlled on Lopressor.  Patient to continue follow-up with electrophysiologist.      Patient Active Problem List    Diagnosis Date Noted   • Inappropriate sinus tachycardia 08/08/2014     Priority: Medium   • Overweight (BMI 25.0-29.9) 02/27/2018   • OPAL (generalized anxiety disorder) 06/23/2017   • Mixed hyperlipidemia 06/12/2017   • Palpitations 05/16/2017   • Dyspepsia 08/05/2016       Additional History:   Allergies:    Lexapro     Current Medications:     Current Outpatient Prescriptions   Medication Sig Dispense Refill   • venlafaxine XR (EFFEXOR XR) 37.5 MG CAPSULE SR 24 HR Take 1 Cap by mouth every day. 30 Cap 3   • ALPRAZolam (XANAX) 0.5 MG Tab Take 1 Tab by mouth 3 times a day as needed for Sleep or Anxiety for up to 30 days. 30 Tab 0   • metoprolol (LOPRESSOR) 25 MG Tab Take 1 Tab by mouth 2 times a day. 180 Tab 5   • omeprazole (PRILOSEC) 20 MG delayed-release capsule Take 1 Cap by mouth every day. 30 Cap 2     No current facility-administered  "medications for this visit.         Social History:     Social History   Substance Use Topics   • Smoking status: Never Smoker   • Smokeless tobacco: Never Used   • Alcohol use 7.2 oz/week     12 Cans of beer per week      Comment: mostly weekends (5-20beers; 12pack on avg)       ROS:     - NOTE: All other systems reviewed and are negative, except as in HPI.     Objective:   Physical Exam:    Vitals: Blood pressure 110/80, pulse 83, temperature 36.8 °C (98.3 °F), height 1.753 m (5' 9.02\"), weight 90.9 kg (200 lb 6.4 oz), SpO2 95 %.   BMI: Body mass index is 29.58 kg/m².   General/Constitutional: Vitals as above, Well nourished, well developed male in no acute distress   Head/Eyes: Head is grossly normal & atraumatic, bilateral conjunctivae clear and not injected, bilateral EOMI, bilateral PERRL   ENT: Bilateral external ears grossly normal in appearance, Hearing grossly intact, External nares normal in appearance and without discharge/bleeding   Respiratory: No respiratory distress, bilateral lungs are clear to ausculation in all lung fields (anterior/lateral/posterior), no wheezing/rhonchi/rales   Cardiovascular: Regular rate and rhythm without murmur/gallops/rubs, distal pulses are intact and equal bilaterally (radial, posterior tibial), no bilateral lower extremity edema   MSK: Gait grossly normal & not antalgic   Integumentary: No apparent rashes   Psych: Judgment grossly appropriate, no apparent depression/anxiety    Health Maintenance:     - 04/02/18 -- UDS positive for Benzos    Imaging/Labs:     - No new labs for    Assessment and Plan:   1. OPAL (generalized anxiety disorder)  Chronic, uncontrolled, however improving.  Patient to restart venlafaxine, however at lower dosage.  Can continue Xanax on as needed basis.   - venlafaxine XR (EFFEXOR XR) 37.5 MG CAPSULE SR 24 HR; Take 1 Cap by mouth every day.  Dispense: 30 Cap; Refill: 3   - ALPRAZolam (XANAX) 0.5 MG Tab; Take 1 Tab by mouth 3 times a day as needed " for Sleep or Anxiety for up to 30 days.  Dispense: 30 Tab; Refill: 0    2. Palpitations  Chronic, uncontrolled, improving.  Continue propranolol.  Can use Xanax on as-needed basis.   - ALPRAZolam (XANAX) 0.5 MG Tab; Take 1 Tab by mouth 3 times a day as needed for Sleep or Anxiety for up to 30 days.  Dispense: 30 Tab; Refill: 0        RTC: in 1month for Anxiety management.    PLEASE NOTE: This dictation was created using voice recognition software. I have made every reasonable attempt to correct obvious errors, but I expect that there are errors of grammar and possibly content that I did not discover before finalizing the note.

## 2018-10-04 ENCOUNTER — OFFICE VISIT (OUTPATIENT)
Dept: MEDICAL GROUP | Facility: MEDICAL CENTER | Age: 28
End: 2018-10-04
Payer: COMMERCIAL

## 2018-10-04 VITALS
HEART RATE: 94 BPM | BODY MASS INDEX: 30.36 KG/M2 | SYSTOLIC BLOOD PRESSURE: 120 MMHG | TEMPERATURE: 98.8 F | OXYGEN SATURATION: 97 % | DIASTOLIC BLOOD PRESSURE: 64 MMHG | WEIGHT: 205 LBS | HEIGHT: 69 IN

## 2018-10-04 DIAGNOSIS — R00.2 PALPITATIONS: ICD-10-CM

## 2018-10-04 DIAGNOSIS — F41.1 GAD (GENERALIZED ANXIETY DISORDER): ICD-10-CM

## 2018-10-04 DIAGNOSIS — I47.11 INAPPROPRIATE SINUS TACHYCARDIA: Chronic | ICD-10-CM

## 2018-10-04 DIAGNOSIS — E78.2 MIXED HYPERLIPIDEMIA: ICD-10-CM

## 2018-10-04 DIAGNOSIS — G47.19 DAYTIME HYPERSOMNOLENCE: ICD-10-CM

## 2018-10-04 PROCEDURE — 99214 OFFICE O/P EST MOD 30 MIN: CPT | Performed by: FAMILY MEDICINE

## 2018-10-04 RX ORDER — ALPRAZOLAM 0.5 MG/1
0.5 TABLET ORAL 3 TIMES DAILY PRN
Qty: 30 TAB | Refills: 0 | Status: SHIPPED | OUTPATIENT
Start: 2018-10-04 | End: 2019-02-19 | Stop reason: SDUPTHER

## 2018-10-04 NOTE — ASSESSMENT & PLAN NOTE
Chronic, uncontrolled, however still well-addressed with Xanax 0.5mg PO Qday PRN that he takes about 4-5times per week.    Of note, review of chart records reveals that patient has been trialed on Effexor, Lexapro, Celexa, Prozac.  Patient cannot recall taking Zoloft in the past.  Patient's main concern with the longer acting SSRIs, SSRIs, etc., is that many of these medications caused him to feel more down/depressed.  Therefore, he feels that they are defeating the purpose of trying to overcome his anxiety if they are causing him to have other symptoms.    Of note, patient states that he is able to get a good night sleep with the Xanax.  However, he has daily daytime hypersomnolence, and he is still waking up a few times a month with a headache, otherwise has no other signs or symptoms indicative of ANN.    ROS is NEGATIVE for generalized weakness/fatigue, dyspnea, tachypnea, respiratory distress, cyanotic skin changes.

## 2018-10-10 PROBLEM — R00.2 PALPITATIONS: Status: RESOLVED | Noted: 2017-05-16 | Resolved: 2018-10-10

## 2018-10-10 NOTE — PROGRESS NOTES
Subjective:   Chief Complaint/History of Present Illness:  Geremias Conte is a 27 y.o. male established patient who presents today to discuss medical problems as listed below    Diagnoses of OPAL (generalized anxiety disorder), Inappropriate sinus tachycardia, Palpitations, Mixed hyperlipidemia, and Daytime hypersomnolence were pertinent to this visit.    OPAL (generalized anxiety disorder)  Chronic, uncontrolled, however still well-addressed with Xanax 0.5mg PO Qday PRN that he takes about 4-5times per week.    Of note, review of chart records reveals that patient has been trialed on Effexor, Lexapro, Celexa, Prozac.  Patient cannot recall taking Zoloft in the past.  Patient's main concern with the longer acting SSRIs, SSRIs, etc., is that many of these medications caused him to feel more down/depressed.  Therefore, he feels that they are defeating the purpose of trying to overcome his anxiety if they are causing him to have other symptoms.    Of note, patient states that he is able to get a good night sleep with the Xanax.  However, he has daily daytime hypersomnolence, and he is still waking up a few times a month with a headache, otherwise has no other signs or symptoms indicative of ANN.    ROS is NEGATIVE for generalized weakness/fatigue, dyspnea, tachypnea, respiratory distress, cyanotic skin changes.     Mixed hyperlipidemia  Patient and I discussed recent labs (see below; mixed dyslipidemia, uncontrolled) and that ASCVD risk is increased based on most recent lipid panel, current blood pressure (non-hypertensive), diabetes status (non-diabetic), and smoking status (non-smoker).    Patient and I then discussed necessary dietary changes to make to address dyslipidemia.  Patient is NOT currently taking cholesterol lowering medication.  Patient verbalized understanding.    ROS is NEGATIVE for dizziness, generalized weakness/fatigue, vision/hearing changes, jaw pain/paresthesias, BUE  pain/paresthesias/numbness/weakness, chest pain/pressure, palpitations, dyspnea, RUQ abdominal pain, oliguria/anuria, BLE edema.    Lab Results   Component Value Date/Time    CHOLSTRLTOT 192 08/21/2017 08:57 AM     (H) 08/21/2017 08:57 AM    HDL 41 08/21/2017 08:57 AM    TRIGLYCERIDE 165 (H) 08/21/2017 08:57 AM       Inappropriate sinus tachycardia  Chronic, stable, well-controlled, taking medication as directed.     ROS is NEGATIVE for dizziness, generalized weakness/fatigue, cold sweats,  vision/hearing changes, jaw pain/paresthesias, BUE pain/paresthesias/numbness/weakness, chest pain/pressure, palpitations, dyspnea, nausea, RUQ abdominal pain, oliguria/anuria, BLE edema.     Daytime hypersomnolence  Patient with daytime hypersomnolence and intermittent headaches upon awakening.  Patient denies illegal drug use, denies binge alcohol drinking but does admit to drinking more heavily on the weekends.      Palpitations  Chronic, stable, well-controlled.  Please see notes from same date of service 10/4/2018 regarding inappropriate sinus tachycardia.      Patient Active Problem List    Diagnosis Date Noted   • Inappropriate sinus tachycardia 08/08/2014     Priority: Medium   • Daytime hypersomnolence 10/04/2018   • Overweight (BMI 25.0-29.9) 02/27/2018   • OPAL (generalized anxiety disorder) 06/23/2017   • Mixed hyperlipidemia 06/12/2017   • Dyspepsia 08/05/2016       Additional History:   Allergies:    Lexapro     Current Medications:     Current Outpatient Prescriptions   Medication Sig Dispense Refill   • sertraline (ZOLOFT) 50 MG Tab Take 1 Tab by mouth every day. 90 Tab 1   • ALPRAZolam (XANAX) 0.5 MG Tab Take 1 Tab by mouth 3 times a day as needed for Sleep or Anxiety for up to 30 days. 30 Tab 0   • metoprolol (LOPRESSOR) 25 MG Tab Take 1 Tab by mouth 2 times a day. 180 Tab 5   • omeprazole (PRILOSEC) 20 MG delayed-release capsule Take 1 Cap by mouth every day. 30 Cap 2     No current facility-administered  "medications for this visit.         Social History:     Social History   Substance Use Topics   • Smoking status: Never Smoker   • Smokeless tobacco: Never Used   • Alcohol use 7.2 oz/week     12 Cans of beer per week      Comment: mostly weekends (5-20beers; 12pack on avg)       ROS:     - NOTE: All other systems reviewed and are negative, except as in HPI.     Objective:   Physical Exam:    Vitals: Blood pressure 120/64, pulse 94, temperature 37.1 °C (98.8 °F), height 1.753 m (5' 9.02\"), weight 93 kg (205 lb), SpO2 97 %.   BMI: Body mass index is 30.26 kg/m².   General/Constitutional: Vitals as above, Well nourished, well developed male in no acute distress   Head/Eyes: Head is grossly normal & atraumatic, bilateral conjunctivae clear and not injected, bilateral EOMI, bilateral PERR   ENT: Bilateral external ears grossly normal in appearance, Hearing grossly intact, External nares normal in appearance and without discharge/bleeding   Respiratory: No respiratory distress, bilateral lungs are clear to ausculation in all lung fields (anterior/lateral/posterior), no wheezing/rhonchi/rales   Cardiovascular: Regular rate and rhythm without murmur/gallops/rubs, distal pulses are intact and equal bilaterally (radial, posterior tibial), no bilateral lower extremity edema   MSK: Gait grossly normal & not antalgic   Integumentary: No apparent rashes   Psych: Judgment grossly appropriate, mild apparent anxiety    Health Maintenance:     - Patient advised to get flu shot within the next 2-4 weeks.    Imaging/Labs:     - No new labs to review    Assessment and Plan:   1. OPAL (generalized anxiety disorder)  Chronic, uncontrolled, trial of Zoloft while patient continue Xanax on an as-needed basis.  Referral to sleep study for further evaluation management in case he has a sleep disorder that is adversely affecting this condition.   - sertraline (ZOLOFT) 50 MG Tab; Take 1 Tab by mouth every day.  Dispense: 90 Tab; Refill: 1   - " REFERRAL TO SLEEP STUDIES   - ALPRAZolam (XANAX) 0.5 MG Tab; Take 1 Tab by mouth 3 times a day as needed for Sleep or Anxiety for up to 30 days.  Dispense: 30 Tab; Refill: 0    2. Inappropriate sinus tachycardia  3. Palpitations  Chronic, stable, well-controlled.  Continue taking medication as directed.    - REFERRAL TO SLEEP STUDIES   - ALPRAZolam (XANAX) 0.5 MG Tab; Take 1 Tab by mouth 3 times a day as needed for Sleep or Anxiety for up to 30 days.  Dispense: 30 Tab; Refill: 0    4. Mixed hyperlipidemia  Chronic, uncontrolled, patient advised to pursue lifestyle changes   - LIPID PROFILE; Future    5. Daytime hypersomnolence  New problem for medical evaluation, uncontrolled, referral as below.   - REFERRAL TO SLEEP STUDIES        RTC: in 2months for Anxiety management.    PLEASE NOTE: This dictation was created using voice recognition software. I have made every reasonable attempt to correct obvious errors, but I expect that there are errors of grammar and possibly content that I did not discover before finalizing the note.

## 2018-10-10 NOTE — ASSESSMENT & PLAN NOTE
Patient and I discussed recent labs (see below; mixed dyslipidemia, uncontrolled) and that ASCVD risk is increased based on most recent lipid panel, current blood pressure (non-hypertensive), diabetes status (non-diabetic), and smoking status (non-smoker).    Patient and I then discussed necessary dietary changes to make to address dyslipidemia.  Patient is NOT currently taking cholesterol lowering medication.  Patient verbalized understanding.    ROS is NEGATIVE for dizziness, generalized weakness/fatigue, vision/hearing changes, jaw pain/paresthesias, BUE pain/paresthesias/numbness/weakness, chest pain/pressure, palpitations, dyspnea, RUQ abdominal pain, oliguria/anuria, BLE edema.    Lab Results   Component Value Date/Time    CHOLSTRLTOT 192 08/21/2017 08:57 AM     (H) 08/21/2017 08:57 AM    HDL 41 08/21/2017 08:57 AM    TRIGLYCERIDE 165 (H) 08/21/2017 08:57 AM

## 2018-10-10 NOTE — ASSESSMENT & PLAN NOTE
Chronic, stable, well-controlled.  Please see notes from same date of service 10/4/2018 regarding inappropriate sinus tachycardia.

## 2018-10-10 NOTE — ASSESSMENT & PLAN NOTE
Patient with daytime hypersomnolence and intermittent headaches upon awakening.  Patient denies illegal drug use, denies binge alcohol drinking but does admit to drinking more heavily on the weekends.

## 2018-10-10 NOTE — ASSESSMENT & PLAN NOTE
Chronic, stable, well-controlled, taking medication as directed.     ROS is NEGATIVE for dizziness, generalized weakness/fatigue, cold sweats,  vision/hearing changes, jaw pain/paresthesias, BUE pain/paresthesias/numbness/weakness, chest pain/pressure, palpitations, dyspnea, nausea, RUQ abdominal pain, oliguria/anuria, BLE edema.

## 2018-11-14 DIAGNOSIS — F41.1 GAD (GENERALIZED ANXIETY DISORDER): ICD-10-CM

## 2018-11-14 DIAGNOSIS — R00.2 PALPITATIONS: ICD-10-CM

## 2018-11-15 RX ORDER — ALPRAZOLAM 0.5 MG/1
0.5 TABLET ORAL 3 TIMES DAILY PRN
Qty: 30 TAB | Refills: 0 | OUTPATIENT
Start: 2018-11-15 | End: 2018-12-15

## 2018-11-15 NOTE — TELEPHONE ENCOUNTER
Phone Number Called: [phone] (home)      Message: Left message for patient that he needs an appointment.    Left Message for patient to call back: yes

## 2018-11-26 ENCOUNTER — TELEPHONE (OUTPATIENT)
Dept: MEDICAL GROUP | Facility: MEDICAL CENTER | Age: 28
End: 2018-11-26

## 2018-11-27 ENCOUNTER — TELEPHONE (OUTPATIENT)
Dept: MEDICAL GROUP | Facility: MEDICAL CENTER | Age: 28
End: 2018-11-27

## 2018-11-28 NOTE — TELEPHONE ENCOUNTER
Please call patient to confirm which medication is taking for his anxiety, whether venlafaxine (Effexor) or sertraline (Zoloft).

## 2019-01-28 ENCOUNTER — NON-PROVIDER VISIT (OUTPATIENT)
Dept: URGENT CARE | Facility: CLINIC | Age: 29
End: 2019-01-28

## 2019-01-28 ENCOUNTER — OFFICE VISIT (OUTPATIENT)
Dept: URGENT CARE | Facility: CLINIC | Age: 29
End: 2019-01-28

## 2019-01-28 DIAGNOSIS — Z01.89 RESPIRATORY CLEARANCE EXAMINATION, ENCOUNTER FOR: ICD-10-CM

## 2019-01-28 PROCEDURE — 8916 PR CLEARANCE ONLY: Performed by: NURSE PRACTITIONER

## 2019-01-28 PROCEDURE — 94375 RESPIRATORY FLOW VOLUME LOOP: CPT | Performed by: NURSE PRACTITIONER

## 2019-01-28 NOTE — PROGRESS NOTES
Geremias Conte is a 28 y.o. male here for a non-provider visit for mask fit respiratory clearance    If abnormal was an in office provider notified today (if so, indicate provider)? Yes  Routed to PCP? No

## 2019-02-19 ENCOUNTER — OFFICE VISIT (OUTPATIENT)
Dept: MEDICAL GROUP | Facility: MEDICAL CENTER | Age: 29
End: 2019-02-19
Payer: COMMERCIAL

## 2019-02-19 VITALS
SYSTOLIC BLOOD PRESSURE: 128 MMHG | WEIGHT: 198 LBS | TEMPERATURE: 98.3 F | RESPIRATION RATE: 16 BRPM | HEART RATE: 88 BPM | DIASTOLIC BLOOD PRESSURE: 88 MMHG | BODY MASS INDEX: 29.33 KG/M2 | OXYGEN SATURATION: 97 % | HEIGHT: 69 IN

## 2019-02-19 DIAGNOSIS — H61.23 BILATERAL IMPACTED CERUMEN: ICD-10-CM

## 2019-02-19 DIAGNOSIS — H91.93 DECREASED HEARING OF BOTH EARS: ICD-10-CM

## 2019-02-19 DIAGNOSIS — F41.1 GAD (GENERALIZED ANXIETY DISORDER): ICD-10-CM

## 2019-02-19 PROCEDURE — 99214 OFFICE O/P EST MOD 30 MIN: CPT | Performed by: PHYSICIAN ASSISTANT

## 2019-02-19 RX ORDER — ALPRAZOLAM 0.5 MG/1
0.5 TABLET ORAL
Qty: 30 TAB | Refills: 1 | Status: SHIPPED | OUTPATIENT
Start: 2019-02-19 | End: 2019-04-15 | Stop reason: SDUPTHER

## 2019-02-19 NOTE — ASSESSMENT & PLAN NOTE
This is a 28-year-old male who has a history of panic attacks.  Associated anxiety.  He was on Zoloft medication was helpful.  He discontinued the medication because of not having any panic attacks.  The panic attacks have returned.  Requesting a refill of Zoloft as well as Xanax for occasional panic attacks.  Denies any depression.

## 2019-02-19 NOTE — PROGRESS NOTES
"Subjective:   Geremias Conte is a 28 y.o. male here today for anxiety and decreased hearing bilaterally.    OPAL (generalized anxiety disorder)  This is a 28-year-old male who has a history of panic attacks.  Associated anxiety.  He was on Zoloft medication was helpful.  He discontinued the medication because of not having any panic attacks.  The panic attacks have returned.  Requesting a refill of Zoloft as well as Xanax for occasional panic attacks.  Denies any depression.    Decreased hearing of both ears  Complains of a chronic history of intermittent hearing loss of his ears.  Usually symptoms will occur in the morning when he wakes up.  He uses Q-tips regularly.  Denies any ear pain.       Current medicines (including changes today)  Current Outpatient Prescriptions   Medication Sig Dispense Refill   • sertraline (ZOLOFT) 50 MG Tab Take 1 Tab by mouth every day. 1/2 tablet first 5 days. 90 Tab 2   • ALPRAZolam (XANAX) 0.5 MG Tab Take 1 Tab by mouth 1 time daily as needed for Anxiety for up to 30 days. 30 Tab 1   • metoprolol (LOPRESSOR) 25 MG Tab Take 1 Tab by mouth 2 times a day. 180 Tab 5     No current facility-administered medications for this visit.      He  has a past medical history of Anxiety (8/8/2014) and Inappropriate sinus tachycardia (8/8/2014).    Social History and Family History were reviewed and updated.    ROS   No chest pain, no shortness of breath, no abdominal pain and all other systems were reviewed and are negative.       Objective:     Blood pressure 128/88, pulse 88, temperature 36.8 °C (98.3 °F), resp. rate 16, height 1.753 m (5' 9\"), weight 89.8 kg (198 lb), SpO2 97 %. Body mass index is 29.24 kg/m².   Physical Exam:  Constitutional: Alert, no distress.  Skin: Warm, dry, good turgor, no rashes in visible areas.  Eye: Equal, round and reactive, conjunctiva clear, lids normal.  ENMT: Lips without lesions, good dentition, oropharynx clear.  TMs bilaterally with cerumen impaction.  Neck: " Trachea midline, no masses.   Lymph: No cervical or supraclavicular lymphadenopathy  Respiratory: Unlabored respiratory effort, lungs clear to auscultation, no wheezes, no ronchi.  Cardiovascular: Normal S1, S2, no murmur, no edema.  Psych: Alert and oriented x3, normal affect and mood.        Assessment and Plan:   The following treatment plan was discussed    1. OPAL (generalized anxiety disorder)  Chronic condition with recent exacerbation.  Discussed importance of taking medication as directed.  Renewed Zoloft as directed.  Also renewed alprazolam.   reviewed.  Medication appropriate.  Follow-up with his PCP for refills.  - sertraline (ZOLOFT) 50 MG Tab; Take 1 Tab by mouth every day. 1/2 tablet first 5 days.  Dispense: 90 Tab; Refill: 2  - ALPRAZolam (XANAX) 0.5 MG Tab; Take 1 Tab by mouth 1 time daily as needed for Anxiety for up to 30 days.  Dispense: 30 Tab; Refill: 1    2. Decreased hearing of both ears  Acute, new onset condition.  Discussed with avoidance of Q-tips.  Use Debrox.  Follow-up for lavage.    3. Bilateral impacted cerumen  New condition noted but likely chronic.  No Q-tip use.  Follow-up for lavage.  Use Debrox prior.      Followup: Return in about 6 months (around 8/19/2019), or if symptoms worsen or fail to improve, for with PCP.    Please note that this dictation was created using voice recognition software. I have made every reasonable attempt to correct obvious errors, but I expect that there are errors of grammar and possibly content that I did not discover before finalizing the note.

## 2019-02-19 NOTE — ASSESSMENT & PLAN NOTE
Complains of a chronic history of intermittent hearing loss of his ears.  Usually symptoms will occur in the morning when he wakes up.  He uses Q-tips regularly.  Denies any ear pain.

## 2019-04-15 ENCOUNTER — OFFICE VISIT (OUTPATIENT)
Dept: MEDICAL GROUP | Facility: MEDICAL CENTER | Age: 29
End: 2019-04-15
Payer: COMMERCIAL

## 2019-04-15 VITALS
BODY MASS INDEX: 30.07 KG/M2 | TEMPERATURE: 99 F | HEIGHT: 69 IN | DIASTOLIC BLOOD PRESSURE: 88 MMHG | OXYGEN SATURATION: 96 % | HEART RATE: 92 BPM | SYSTOLIC BLOOD PRESSURE: 138 MMHG | WEIGHT: 203 LBS | RESPIRATION RATE: 16 BRPM

## 2019-04-15 DIAGNOSIS — R21 RASH: ICD-10-CM

## 2019-04-15 DIAGNOSIS — F41.1 GAD (GENERALIZED ANXIETY DISORDER): ICD-10-CM

## 2019-04-15 DIAGNOSIS — F10.10 ALCOHOL ABUSE: ICD-10-CM

## 2019-04-15 DIAGNOSIS — I47.11 INAPPROPRIATE SINUS TACHYCARDIA: Chronic | ICD-10-CM

## 2019-04-15 PROCEDURE — 99214 OFFICE O/P EST MOD 30 MIN: CPT | Performed by: PHYSICIAN ASSISTANT

## 2019-04-15 RX ORDER — ALPRAZOLAM 0.5 MG/1
0.5 TABLET ORAL NIGHTLY PRN
COMMUNITY
End: 2019-04-15

## 2019-04-15 RX ORDER — ALPRAZOLAM 0.5 MG/1
0.5 TABLET ORAL
Qty: 30 TAB | Refills: 2 | Status: SHIPPED | OUTPATIENT
Start: 2019-04-15 | End: 2019-08-05 | Stop reason: SDUPTHER

## 2019-04-15 RX ORDER — TRIAMCINOLONE ACETONIDE 5 MG/G
CREAM TOPICAL
Qty: 60 G | Refills: 1 | Status: SHIPPED | OUTPATIENT
Start: 2019-04-15 | End: 2019-06-25

## 2019-04-15 ASSESSMENT — PATIENT HEALTH QUESTIONNAIRE - PHQ9: CLINICAL INTERPRETATION OF PHQ2 SCORE: 0

## 2019-04-15 NOTE — ASSESSMENT & PLAN NOTE
Complains of a 3-day history of a rash on his right arm.  Went out to [place] to a friend's house and came back with a rash.  Denies any sick contacts.  Denies any hiking.  Has tried no medication.  Wife has no problems.  Rash does itch.

## 2019-04-15 NOTE — ASSESSMENT & PLAN NOTE
Consumes alcohol mostly on weekends in large quantities.  Up to 20 and a weekend.  Denies any addiction alcohol.

## 2019-04-15 NOTE — ASSESSMENT & PLAN NOTE
Chronic condition.  Follows with cardiology.  Has had a couple event monitors.  Cardiology believes he has PVCs.  He was told to take metoprolol daily.  Does not take the medication daily.

## 2019-04-15 NOTE — PROGRESS NOTES
Subjective:   Geremias Conte is a 28 y.o. male here today for anxiety, history of PVCs and tachycardia, alcohol abuse and rash on his right arm.    OPAL (generalized anxiety disorder)  This is a 28-year-old male accompanied by his long-term girlfriend.  Here today for anxiety.  Takes Xanax 0.5 mg daily or when needed.  Requesting a refill.  I saw him last month and gave him a one-month supply.  Advised him to to follow-up with his PCP.  Unable to see his PCP.  Takes Zoloft 50 mg daily as well.    Inappropriate sinus tachycardia  Chronic condition.  Follows with cardiology.  Has had a couple event monitors.  Cardiology believes he has PVCs.  He was told to take metoprolol daily.  Does not take the medication daily.    Alcohol abuse  Consumes alcohol mostly on weekends in large quantities.  Up to 20 and a weekend.  Denies any addiction alcohol.    Rash  Complains of a 3-day history of a rash on his right arm.  Went out to [place] to a friend's house and came back with a rash.  Denies any sick contacts.  Denies any hiking.  Has tried no medication.  Wife has no problems.  Rash does itch.       Current medicines (including changes today)  Current Outpatient Prescriptions   Medication Sig Dispense Refill   • ALPRAZolam (XANAX) 0.5 MG Tab Take 1 Tab by mouth 1 time daily as needed for Anxiety for up to 30 days. 30 Tab 2   • triamcinolone acetonide (KENALOG) 0.5 % Cream Apply three times daily to arm. 60 g 1   • sertraline (ZOLOFT) 50 MG Tab Take 1 Tab by mouth every day. 1/2 tablet first 5 days. 90 Tab 2   • metoprolol (LOPRESSOR) 25 MG Tab Take 1 Tab by mouth 2 times a day. 180 Tab 5     No current facility-administered medications for this visit.      He  has a past medical history of Anxiety (8/8/2014) and Inappropriate sinus tachycardia (8/8/2014).    Social History and Family History were reviewed and updated.    ROS   No chest pain, no shortness of breath, no abdominal pain and all other systems were reviewed and are  "negative.       Objective:     /88 (BP Location: Right arm, Patient Position: Sitting, BP Cuff Size: Adult)   Pulse 92   Temp 37.2 °C (99 °F) (Temporal)   Resp 16   Ht 1.753 m (5' 9\")   Wt 92.1 kg (203 lb)   SpO2 96%  Body mass index is 29.98 kg/m².   Physical Exam:  Constitutional: Alert, no distress.  Skin: Warm, dry, good turgor.  Right arm along the forearm and anterior aspect there are several excoriations and scabs.  Some papules noted.  Does not appear to be any herpetic lesions.  Eye: Equal, round and reactive, conjunctiva clear, lids normal.  ENMT: Lips without lesions, good dentition, oropharynx clear.  Neck: Trachea midline, no masses.   Lymph: No cervical or supraclavicular lymphadenopathy  Respiratory: Unlabored respiratory effort, lungs clear to auscultation, no wheezes, no ronchi.  Cardiovascular: Normal S1, S2, no murmur, no edema.  Psych: Alert and oriented x3, normal affect and mood.        Assessment and Plan:   The following treatment plan was discussed    1. OPAL (generalized anxiety disorder)  Chronic condition.  Stable.  Continue Zoloft.  Follow-up with his PCP for refills.  Renewed Xanax 0.5 mg and provided a 90-day supply.  Must follow-up to see his PCP for refills.   reviewed.  Medication appropriate.  - ALPRAZolam (XANAX) 0.5 MG Tab; Take 1 Tab by mouth 1 time daily as needed for Anxiety for up to 30 days.  Dispense: 30 Tab; Refill: 2    2. Rash  Acute, new onset condition.  Unknown etiology.  Does not appear to be secondary to scabies or herpes.  Does not also appear to be secondary to shingles.  Provided a steroid cream to use as directed.  Advised take an over-the-counter antihistamine as directed.  Follow-up with any worsening symptoms.  - triamcinolone acetonide (KENALOG) 0.5 % Cream; Apply three times daily to arm.  Dispense: 60 g; Refill: 1    3. Inappropriate sinus tachycardia  Chronic condition.  Stable.  Advised to take metoprolol 0.5 mg daily.  May take it twice a " day if his symptoms worsen.  If symptoms are not improving must follow-up with cardiology.  Advised as well to exercise routinely.  Lose weight.  Advised to watch his alcohol intake.    4. Alcohol abuse  New condition noted but chronic use.  Advised to cut back on alcohol consumption.  May be affecting his irregular heart rate.  Today blood pressure is slightly elevated.  It is a Monday and he drank alcohol over the weekend.  We will need to monitor that and follow-up with his PCP.      Followup: Return in about 3 months (around 7/15/2019), or if symptoms worsen or fail to improve, for with PCP for refill.    Please note that this dictation was created using voice recognition software. I have made every reasonable attempt to correct obvious errors, but I expect that there are errors of grammar and possibly content that I did not discover before finalizing the note.

## 2019-04-15 NOTE — ASSESSMENT & PLAN NOTE
This is a 28-year-old male accompanied by his long-term girlfriend.  Here today for anxiety.  Takes Xanax 0.5 mg daily or when needed.  Requesting a refill.  I saw him last month and gave him a one-month supply.  Advised him to to follow-up with his PCP.  Unable to see his PCP.  Takes Zoloft 50 mg daily as well.

## 2019-04-17 ENCOUNTER — APPOINTMENT (OUTPATIENT)
Dept: RADIOLOGY | Facility: MEDICAL CENTER | Age: 29
End: 2019-04-17
Attending: EMERGENCY MEDICINE
Payer: COMMERCIAL

## 2019-04-17 ENCOUNTER — HOSPITAL ENCOUNTER (EMERGENCY)
Facility: MEDICAL CENTER | Age: 29
End: 2019-04-17
Attending: EMERGENCY MEDICINE
Payer: COMMERCIAL

## 2019-04-17 VITALS
HEIGHT: 69 IN | RESPIRATION RATE: 18 BRPM | BODY MASS INDEX: 29.58 KG/M2 | HEART RATE: 66 BPM | DIASTOLIC BLOOD PRESSURE: 99 MMHG | SYSTOLIC BLOOD PRESSURE: 158 MMHG | TEMPERATURE: 99.2 F | WEIGHT: 199.74 LBS | OXYGEN SATURATION: 96 %

## 2019-04-17 DIAGNOSIS — R07.9 CHEST PAIN, UNSPECIFIED TYPE: ICD-10-CM

## 2019-04-17 LAB
ALBUMIN SERPL BCP-MCNC: 4.8 G/DL (ref 3.2–4.9)
ALBUMIN/GLOB SERPL: 1.8 G/DL
ALP SERPL-CCNC: 80 U/L (ref 30–99)
ALT SERPL-CCNC: 50 U/L (ref 2–50)
ANION GAP SERPL CALC-SCNC: 11 MMOL/L (ref 0–11.9)
AST SERPL-CCNC: 24 U/L (ref 12–45)
BASOPHILS # BLD AUTO: 0.8 % (ref 0–1.8)
BASOPHILS # BLD: 0.05 K/UL (ref 0–0.12)
BILIRUB SERPL-MCNC: 0.6 MG/DL (ref 0.1–1.5)
BUN SERPL-MCNC: 10 MG/DL (ref 8–22)
CALCIUM SERPL-MCNC: 9.6 MG/DL (ref 8.5–10.5)
CHLORIDE SERPL-SCNC: 102 MMOL/L (ref 96–112)
CO2 SERPL-SCNC: 27 MMOL/L (ref 20–33)
CREAT SERPL-MCNC: 0.83 MG/DL (ref 0.5–1.4)
EKG IMPRESSION: NORMAL
EOSINOPHIL # BLD AUTO: 0.11 K/UL (ref 0–0.51)
EOSINOPHIL NFR BLD: 1.7 % (ref 0–6.9)
ERYTHROCYTE [DISTWIDTH] IN BLOOD BY AUTOMATED COUNT: 39.7 FL (ref 35.9–50)
GLOBULIN SER CALC-MCNC: 2.6 G/DL (ref 1.9–3.5)
GLUCOSE SERPL-MCNC: 86 MG/DL (ref 65–99)
HCT VFR BLD AUTO: 46.3 % (ref 42–52)
HGB BLD-MCNC: 16.3 G/DL (ref 14–18)
IMM GRANULOCYTES # BLD AUTO: 0.04 K/UL (ref 0–0.11)
IMM GRANULOCYTES NFR BLD AUTO: 0.6 % (ref 0–0.9)
LYMPHOCYTES # BLD AUTO: 2.1 K/UL (ref 1–4.8)
LYMPHOCYTES NFR BLD: 31.7 % (ref 22–41)
MCH RBC QN AUTO: 31.6 PG (ref 27–33)
MCHC RBC AUTO-ENTMCNC: 35.2 G/DL (ref 33.7–35.3)
MCV RBC AUTO: 89.7 FL (ref 81.4–97.8)
MONOCYTES # BLD AUTO: 0.62 K/UL (ref 0–0.85)
MONOCYTES NFR BLD AUTO: 9.4 % (ref 0–13.4)
NEUTROPHILS # BLD AUTO: 3.7 K/UL (ref 1.82–7.42)
NEUTROPHILS NFR BLD: 55.8 % (ref 44–72)
NRBC # BLD AUTO: 0 K/UL
NRBC BLD-RTO: 0 /100 WBC
PLATELET # BLD AUTO: 276 K/UL (ref 164–446)
PMV BLD AUTO: 9.8 FL (ref 9–12.9)
POTASSIUM SERPL-SCNC: 3.9 MMOL/L (ref 3.6–5.5)
PROT SERPL-MCNC: 7.4 G/DL (ref 6–8.2)
RBC # BLD AUTO: 5.16 M/UL (ref 4.7–6.1)
SODIUM SERPL-SCNC: 140 MMOL/L (ref 135–145)
TROPONIN I SERPL-MCNC: <0.01 NG/ML (ref 0–0.04)
WBC # BLD AUTO: 6.6 K/UL (ref 4.8–10.8)

## 2019-04-17 PROCEDURE — 80053 COMPREHEN METABOLIC PANEL: CPT

## 2019-04-17 PROCEDURE — 85025 COMPLETE CBC W/AUTO DIFF WBC: CPT

## 2019-04-17 PROCEDURE — 71045 X-RAY EXAM CHEST 1 VIEW: CPT

## 2019-04-17 PROCEDURE — 99284 EMERGENCY DEPT VISIT MOD MDM: CPT

## 2019-04-17 PROCEDURE — 93005 ELECTROCARDIOGRAM TRACING: CPT

## 2019-04-17 PROCEDURE — 84484 ASSAY OF TROPONIN QUANT: CPT

## 2019-04-17 PROCEDURE — 93005 ELECTROCARDIOGRAM TRACING: CPT | Performed by: EMERGENCY MEDICINE

## 2019-04-17 RX ORDER — ESCITALOPRAM OXALATE 10 MG/1
10 TABLET ORAL DAILY
COMMUNITY
End: 2019-06-04

## 2019-04-17 ASSESSMENT — LIFESTYLE VARIABLES: DO YOU DRINK ALCOHOL: NO

## 2019-04-17 NOTE — ED PROVIDER NOTES
ED Provider Note    Chief Complaint:   Palpitations, chest pain    HPI:  Geremias Conte is a 28 y.o. male who presents with chief complaint of palpitations and shortness of breath.  He states he has a long-standing history of inappropriate sinus tachycardia, earlier today he felt as though his heart went into a rapid rhythm.  He did have some associated shortness of breath and chest discomfort at the time, symptoms completely resolved while he was on his way to the emergency department.  When he arrived, he is now asymptomatic, heart rate is well within normal limits.  He is having no shortness of breath, no lightheadedness.  He reports no recent febrile nor flulike illness.  States he has had similar symptoms previously that resolved without intervention, however he became concerned today so he presented to the emergency department.    He denies any leg pain or leg swelling, denies any history of malignancy nor thromboembolic disease, denies any recent travel or immobilization.     He is followed by Dr. Diaz, cardiologist.  Reports that he is prescribed metoprolol which she has been taking daily with no missed medications.  He has had previous Holter monitor test performed, and has been diagnosed with inappropriate tachycardia.    Review of Systems:  See HPI for pertinent positives and negatives. All other systems negative.    Past Medical History:   has a past medical history of Anxiety (8/8/2014) and Inappropriate sinus tachycardia (8/8/2014).    Social History:  Social History     Social History Main Topics   • Smoking status: Never Smoker   • Smokeless tobacco: Never Used   • Alcohol use 7.2 oz/week     12 Cans of beer per week      Comment: mostly weekends (5-20beers; 12pack on avg)   • Drug use: No   • Sexual activity: Yes     Partners: Female      Comment: long-term GF, two children ([employer] fan), Snehal       Surgical History:  patient denies any surgical history    Current Medications:  Home Medications      "Reviewed by Jayleen Carrasco R.N. (Registered Nurse) on 04/17/19 at 1408  Med List Status: Complete   Medication Last Dose Status   ALPRAZolam (XANAX) 0.5 MG Tab prn Active   escitalopram (LEXAPRO) 10 MG Tab taking Active   metoprolol (LOPRESSOR) 25 MG Tab taking Active   sertraline (ZOLOFT) 50 MG Tab not taking Active   triamcinolone acetonide (KENALOG) 0.5 % Cream not taking Active                Allergies:  Allergies   Allergen Reactions   • Lexapro Unspecified     Made patient more anxious       Physical Exam:  Vital Signs: /99   Pulse 66   Temp 37.3 °C (99.2 °F) (Temporal)   Resp 18   Ht 1.753 m (5' 9\")   Wt 90.6 kg (199 lb 11.8 oz)   SpO2 96%   BMI 29.50 kg/m²   Constitutional: Alert, no acute distress  HENT: Moist mucus membranes, normal posterior pharynx, no intraoral lesions  Eyes: Pupils equal and reactive, normal conjunctiva  Neck: Supple, normal range of motion, no stridor  Cardiovascular: Extremities are warm and well perfused, no murmur appreciated, normal cardiac auscultation, no friction rub, regular rhythm  Pulmonary: No respiratory distress, normal work of breathing, no accessory muscule usage, breath sounds clear and equal bilaterally  Abdomen: Soft, non-distended, non-tender to palpation, no peritoneal signs  Skin: Warm, dry, no rashes or lesions  Musculoskeletal: Normal range of motion in all extremities, no swelling or deformity noted  Neurologic: Alert, oriented, normal speech, normal motor function  Psychiatric: Normal and appropriate mood and affect    Medical records reviewed for continuity of care.  Primary care clinic note reviewed from 4/15/19.  Patient was seen with a history of generalized anxiety disorder, takes Xanax as needed. History of inappropriate sinus tachycardia, follows with cardiology.  Has had Holter monitoring studies done.  Noted to have history of PVCs and told to take metoprolol daily.  History of alcohol abuse noted, usually binge drinking on the " weekends.    EKG: Rate 78, normal sinus rhythm, no ST elevation or depression, no T wave inversions, no ectopy    Labs:  Labs Reviewed   CBC WITH DIFFERENTIAL   COMP METABOLIC PANEL   TROPONIN   ESTIMATED GFR       Radiology:  DX-CHEST-PORTABLE (1 VIEW)   Final Result         1. No acute cardiopulmonary abnormalities are identified.           ED Medications Administered:  Medications - No data to display    Differential diagnosis:  Cardiac arrhythmia including SVT or inappropriate tachycardia, myocarditis, pericarditis    MDM:  History and physical exam as documented above.  Patient presents with an episode of chest pain and palpitations, resolved on arrival.  EKG is reassuring, no EKG signs of pericarditis.  Patient has no recent flulike illness, normal troponin, no evidence of myocarditis.  He has no risk factors for pulmonary embolism, PERC score is 0.  Heart score is 0.    On laboratory evaluation troponin is negative, CBC and CMP are entirely within normal limits.  Chest x-ray is negative for acute process.    At this time, I do not believe he requires further emergent diagnostics nor treatment.  He is discharged home in stable condition.  He will call Dr. Diaz's clinic in the morning to schedule a close emergency department follow-up appointment.  Return precautions discussed including recurrent pain, palpitations, fevers, lightheadedness, shortness of breath, or any further concerns.    Blood pressure today is greater than 120/80, patient is instructed to follow up with primary care provider for blood pressure recheck.    Disposition:  Discharge home in stable condition    Final Impression:  1. Chest pain, unspecified type      Electronically signed by: Qian Appiah, 4/17/2019 7:55 PM

## 2019-04-17 NOTE — ED NOTES
Discussed DC instructions with patient, patient verbalizes understanding. Patient has steady gait walking to lobby to DC home.

## 2019-04-17 NOTE — DISCHARGE INSTRUCTIONS
Please call your cardiologist tomorrow morning to schedule close follow-up appointment.  Return to the emergency department if you develop any new or worsening symptoms including recurrent chest pain, shortness of breath, palpitations, lightheadedness, or any further concerns.  Additionally please return if your symptoms recur and you are not able to follow-up with your cardiologist.

## 2019-04-17 NOTE — ED TRIAGE NOTES
Chief Complaint   Patient presents with   • Chest Pain     ambulates to triage c/o chest pain this morning. describes pain as dull in mid chest non-radiating accompanied by n/v. pt arrived AAOX4. skin pwd. denies cardiac history.     Arrived w/pain resolved. Triage process explained. Instructed to notify staff for any worsening symptoms.   Has hx of anxiety.

## 2019-04-23 ENCOUNTER — OFFICE VISIT (OUTPATIENT)
Dept: CARDIOLOGY | Facility: MEDICAL CENTER | Age: 29
End: 2019-04-23
Payer: COMMERCIAL

## 2019-04-23 VITALS
HEART RATE: 66 BPM | WEIGHT: 205 LBS | BODY MASS INDEX: 31.07 KG/M2 | SYSTOLIC BLOOD PRESSURE: 130 MMHG | DIASTOLIC BLOOD PRESSURE: 80 MMHG | OXYGEN SATURATION: 96 % | HEIGHT: 68 IN

## 2019-04-23 DIAGNOSIS — R07.89 OTHER CHEST PAIN: ICD-10-CM

## 2019-04-23 DIAGNOSIS — R00.2 PALPITATIONS: ICD-10-CM

## 2019-04-23 PROCEDURE — 99214 OFFICE O/P EST MOD 30 MIN: CPT | Performed by: NURSE PRACTITIONER

## 2019-04-23 ASSESSMENT — ENCOUNTER SYMPTOMS
SPUTUM PRODUCTION: 0
DIZZINESS: 0
SHORTNESS OF BREATH: 0
ABDOMINAL PAIN: 0
NAUSEA: 0
VOMITING: 0
WHEEZING: 0
FEVER: 0
MUSCULOSKELETAL NEGATIVE: 1
SORE THROAT: 0
FOCAL WEAKNESS: 0
CLAUDICATION: 0
FLANK PAIN: 0
DOUBLE VISION: 0
HEARTBURN: 0
CHILLS: 0
COUGH: 0
PSYCHIATRIC NEGATIVE: 1
ORTHOPNEA: 0
BLURRED VISION: 0
HEADACHES: 0
PALPITATIONS: 0
SEIZURES: 0
PND: 0
SPEECH CHANGE: 0
WEIGHT LOSS: 0
LOSS OF CONSCIOUSNESS: 0

## 2019-04-23 NOTE — PROGRESS NOTES
Chief Complaint   Patient presents with   • Tachycardia     follow up       Subjective:   Geremias Conte is a 28 y.o. male who presents today for follow up in regard to recurrent arrhythmias.  The patient has been diagnosed with isolated APC's and IAST . Dr Diaz had started the patient on Metoprolol short acting version once a day.  The patient states he has had recurrence of arrhythmias and wa recently in the ER for this issue. EKG when he got to the ER was sinus.  He states the episodes tend to occur when he is under stress for example at work.  He drinks too much and states he does drink to excess. I have asked him to stop drinking.  Most recent echo as follows:  CONCLUSIONS  8/21/14  Normal left ventricular systolic function.  Left ventricular ejection fraction is 60% to 65%.  No significant valve abnormalities.     Past Medical History:   Diagnosis Date   • Anxiety 8/8/2014   • Inappropriate sinus tachycardia 8/8/2014     History reviewed. No pertinent surgical history.  Family History   Problem Relation Age of Onset   • Hypertension Mother      Social History     Social History   • Marital status:      Spouse name: N/A   • Number of children: N/A   • Years of education: N/A     Occupational History   • Not on file.     Social History Main Topics   • Smoking status: Never Smoker   • Smokeless tobacco: Never Used   • Alcohol use 7.2 oz/week     12 Cans of beer per week      Comment: mostly weekends (5-20beers; 12pack on avg)   • Drug use: No   • Sexual activity: Yes     Partners: Female      Comment: long-term GF, two children (David fan), Snehal     Other Topics Concern   • Not on file     Social History Narrative   • No narrative on file     Allergies   Allergen Reactions   • Lexapro Unspecified     Made patient more anxious     Outpatient Encounter Prescriptions as of 4/23/2019   Medication Sig Dispense Refill   • escitalopram (LEXAPRO) 10 MG Tab Take 10 mg by mouth every day.     • ALPRAZolam (XANAX) 0.5  "MG Tab Take 1 Tab by mouth 1 time daily as needed for Anxiety for up to 30 days. 30 Tab 2   • triamcinolone acetonide (KENALOG) 0.5 % Cream Apply three times daily to arm. 60 g 1   • sertraline (ZOLOFT) 50 MG Tab Take 1 Tab by mouth every day. 1/2 tablet first 5 days. 90 Tab 2   • metoprolol (LOPRESSOR) 25 MG Tab Take 1 Tab by mouth 2 times a day. 180 Tab 5     No facility-administered encounter medications on file as of 4/23/2019.      Review of Systems   Constitutional: Negative for chills, fever, malaise/fatigue and weight loss.   HENT: Negative for congestion and sore throat.    Eyes: Negative for blurred vision and double vision.   Respiratory: Negative for cough, sputum production, shortness of breath and wheezing.    Cardiovascular: Negative for chest pain, palpitations, orthopnea, claudication, leg swelling and PND.   Gastrointestinal: Negative for abdominal pain, heartburn, nausea and vomiting.   Genitourinary: Negative for dysuria, flank pain and frequency.   Musculoskeletal: Negative.    Skin: Negative.    Neurological: Negative for dizziness, speech change, focal weakness, seizures, loss of consciousness and headaches.   Endo/Heme/Allergies: Negative.    Psychiatric/Behavioral: Negative.         Objective:   /80 (BP Location: Left arm, Patient Position: Sitting, BP Cuff Size: Adult)   Pulse 66   Ht 1.727 m (5' 8\")   Wt 93 kg (205 lb)   SpO2 96%   BMI 31.17 kg/m²     Physical Exam   Constitutional: He is oriented to person, place, and time. He appears well-developed and well-nourished.   HENT:   Head: Normocephalic and atraumatic.   Eyes: Pupils are equal, round, and reactive to light.   Neck: Normal range of motion. Neck supple.   Cardiovascular: Normal rate and regular rhythm.    Pulmonary/Chest: Effort normal and breath sounds normal.   Abdominal: Soft. Bowel sounds are normal.   Musculoskeletal: Normal range of motion.   Neurological: He is alert and oriented to person, place, and time. "   Skin: Skin is warm and dry.   Psychiatric: He has a normal mood and affect.       Assessment:     1. Other chest pain  LIPID PANEL    ZIO PATCH MONITOR   2. Palpitations  THYROID PANEL WITH TSH    ZIO PATCH MONITOR       Medical Decision Making:  Today's Assessment / Status / Plan:     1. Chest pain associated with palpitations isolated APC's.  2. Palpitations etiology unclear arrhythmias in the past have been isolated forms and not hemodynamically significant.  Monitor to determine what patient is feeling.  Labs Thyroid and lipid to assess these areas for baseline.  RTc after Zio results reported.    Collaborating MD  To

## 2019-04-23 NOTE — LETTER
[facility]   [address], [address]  [place] [address]  Phone: [phone]  Fax: [phone]              Geremias Conte  1990    Encounter Date: 4/23/2019    LUIZ Torrez          PROGRESS NOTE:  Chief Complaint   Patient presents with   • Tachycardia     follow up       Subjective:   Geremias Conte is a 28 y.o. male who presents today for follow up in regard to recurrent arrhythmias.  The patient has been diagnosed with isolated APC's and IAST . Dr Diaz had started the patient on Metoprolol short acting version once a day.  The patient states he has had recurrence of arrhythmias and wa recently in the ER for this issue. EKG when he got to the ER was sinus.  He states the episodes tend to occur when he is under stress for example at work.  He drinks too much and states he does drink to excess. I have asked him to stop drinking.  Most recent echo as follows:  CONCLUSIONS  8/21/14  Normal left ventricular systolic function.  Left ventricular ejection fraction is 60% to 65%.  No significant valve abnormalities.     Past Medical History:   Diagnosis Date   • Anxiety 8/8/2014   • Inappropriate sinus tachycardia 8/8/2014     History reviewed. No pertinent surgical history.  Family History   Problem Relation Age of Onset   • Hypertension Mother      Social History     Social History   • Marital status:      Spouse name: N/A   • Number of children: N/A   • Years of education: N/A     Occupational History   • Not on file.     Social History Main Topics   • Smoking status: Never Smoker   • Smokeless tobacco: Never Used   • Alcohol use 7.2 oz/week     12 Cans of beer per week      Comment: mostly weekends (5-20beers; 12pack on avg)   • Drug use: No   • Sexual activity: Yes     Partners: Female      Comment: long-term GF, two children (David fan), Snehal     Other Topics Concern   • Not on file     Social History Narrative   • No narrative on file      "    Allergies   Allergen Reactions   • Lexapro Unspecified     Made patient more anxious     Outpatient Encounter Prescriptions as of 4/23/2019   Medication Sig Dispense Refill   • escitalopram (LEXAPRO) 10 MG Tab Take 10 mg by mouth every day.     • ALPRAZolam (XANAX) 0.5 MG Tab Take 1 Tab by mouth 1 time daily as needed for Anxiety for up to 30 days. 30 Tab 2   • triamcinolone acetonide (KENALOG) 0.5 % Cream Apply three times daily to arm. 60 g 1   • sertraline (ZOLOFT) 50 MG Tab Take 1 Tab by mouth every day. 1/2 tablet first 5 days. 90 Tab 2   • metoprolol (LOPRESSOR) 25 MG Tab Take 1 Tab by mouth 2 times a day. 180 Tab 5     No facility-administered encounter medications on file as of 4/23/2019.      Review of Systems   Constitutional: Negative for chills, fever, malaise/fatigue and weight loss.   HENT: Negative for congestion and sore throat.    Eyes: Negative for blurred vision and double vision.   Respiratory: Negative for cough, sputum production, shortness of breath and wheezing.    Cardiovascular: Negative for chest pain, palpitations, orthopnea, claudication, leg swelling and PND.   Gastrointestinal: Negative for abdominal pain, heartburn, nausea and vomiting.   Genitourinary: Negative for dysuria, flank pain and frequency.   Musculoskeletal: Negative.    Skin: Negative.    Neurological: Negative for dizziness, speech change, focal weakness, seizures, loss of consciousness and headaches.   Endo/Heme/Allergies: Negative.    Psychiatric/Behavioral: Negative.         Objective:   /80 (BP Location: Left arm, Patient Position: Sitting, BP Cuff Size: Adult)   Pulse 66   Ht 1.727 m (5' 8\")   Wt 93 kg (205 lb)   SpO2 96%   BMI 31.17 kg/m²      Physical Exam   Constitutional: He is oriented to person, place, and time. He appears well-developed and well-nourished.   HENT:   Head: Normocephalic and atraumatic.   Eyes: Pupils are equal, round, and reactive to light.   Neck: Normal range of motion. Neck " supple.   Cardiovascular: Normal rate and regular rhythm.    Pulmonary/Chest: Effort normal and breath sounds normal.   Abdominal: Soft. Bowel sounds are normal.   Musculoskeletal: Normal range of motion.   Neurological: He is alert and oriented to person, place, and time.   Skin: Skin is warm and dry.   Psychiatric: He has a normal mood and affect.       Assessment:     1. Other chest pain  LIPID PANEL    ZIO PATCH MONITOR   2. Palpitations  THYROID PANEL WITH TSH    ZIO PATCH MONITOR       Medical Decision Making:  Today's Assessment / Status / Plan:     1. Chest pain associated with palpitations isolated APC's.  2. Palpitations etiology unclear arrhythmias in the past have been isolated forms and not hemodynamically significant.  Monitor to determine what patient is feeling.  Labs Thyroid and lipid to assess these areas for baseline.  RTc after Zio results reported.    Collaborating MD  To      Bro Mcdaniel M.D.  [address]  [address]  [place] [id number]  VIA In Basket

## 2019-05-06 ENCOUNTER — TELEPHONE (OUTPATIENT)
Dept: CARDIOLOGY | Facility: MEDICAL CENTER | Age: 29
End: 2019-05-06

## 2019-05-06 ENCOUNTER — NON-PROVIDER VISIT (OUTPATIENT)
Dept: CARDIOLOGY | Facility: MEDICAL CENTER | Age: 29
End: 2019-05-06
Attending: NURSE PRACTITIONER
Payer: COMMERCIAL

## 2019-05-06 DIAGNOSIS — R00.2 PALPITATIONS: ICD-10-CM

## 2019-05-06 DIAGNOSIS — R07.89 OTHER CHEST PAIN: ICD-10-CM

## 2019-05-07 ENCOUNTER — APPOINTMENT (OUTPATIENT)
Dept: MEDICAL GROUP | Facility: MEDICAL CENTER | Age: 29
End: 2019-05-07
Payer: COMMERCIAL

## 2019-05-30 PROCEDURE — 0296T PR EXT ECG > 48HR TO 21 DAY RCRD W/CONECT INTL RCRD: CPT | Performed by: INTERNAL MEDICINE

## 2019-05-30 PROCEDURE — 0298T PR EXT ECG > 48HR TO 21 DAY REVIEW AND INTERPRETATN: CPT | Performed by: INTERNAL MEDICINE

## 2019-05-31 ENCOUNTER — TELEPHONE (OUTPATIENT)
Dept: CARDIOLOGY | Facility: MEDICAL CENTER | Age: 29
End: 2019-05-31

## 2019-05-31 NOTE — TELEPHONE ENCOUNTER
Predominantly sinus rhythm. Very rare ventricular ectopy. Symptoms associated with normal sinus rhythm. Overall unremarkable monitoring for arrhythmia.    Mohan Diaz MD    -----------------------------------------------------     [employer] message sent to pt.

## 2019-06-03 ENCOUNTER — TELEPHONE (OUTPATIENT)
Dept: CARDIOLOGY | Facility: MEDICAL CENTER | Age: 29
End: 2019-06-03

## 2019-06-03 NOTE — TELEPHONE ENCOUNTER
ZIO PATCH MONITOR   Notes recorded by LUIZ Torrez on 5/31/2019 at 11:48 AM PDT  Please give patient results per Dr Diaz:  Predominantly sinus rhythm. Very rare ventricular ectopy. Symptoms associated with normal sinus rhythm. Overall unremarkable monitoring for arrhythmia.    Mohan Diaz MD       Attempted to call pt, no answer, left vm to call back

## 2019-06-04 ENCOUNTER — OFFICE VISIT (OUTPATIENT)
Dept: MEDICAL GROUP | Facility: MEDICAL CENTER | Age: 29
End: 2019-06-04
Payer: COMMERCIAL

## 2019-06-04 VITALS
TEMPERATURE: 98.2 F | RESPIRATION RATE: 20 BRPM | DIASTOLIC BLOOD PRESSURE: 94 MMHG | HEIGHT: 68 IN | HEART RATE: 78 BPM | BODY MASS INDEX: 30.74 KG/M2 | WEIGHT: 202.82 LBS | SYSTOLIC BLOOD PRESSURE: 152 MMHG | OXYGEN SATURATION: 97 %

## 2019-06-04 DIAGNOSIS — I47.11 INAPPROPRIATE SINUS TACHYCARDIA: Chronic | ICD-10-CM

## 2019-06-04 DIAGNOSIS — Z00.00 ANNUAL PHYSICAL EXAM: ICD-10-CM

## 2019-06-04 DIAGNOSIS — F41.1 GAD (GENERALIZED ANXIETY DISORDER): ICD-10-CM

## 2019-06-04 DIAGNOSIS — E78.2 MIXED HYPERLIPIDEMIA: ICD-10-CM

## 2019-06-04 DIAGNOSIS — E66.3 OVERWEIGHT (BMI 25.0-29.9): ICD-10-CM

## 2019-06-04 PROBLEM — F10.21 H/O ALCOHOL DEPENDENCE (HCC): Status: ACTIVE | Noted: 2019-04-15

## 2019-06-04 PROBLEM — H61.23 BILATERAL IMPACTED CERUMEN: Status: RESOLVED | Noted: 2019-02-19 | Resolved: 2019-06-04

## 2019-06-04 PROCEDURE — 99214 OFFICE O/P EST MOD 30 MIN: CPT | Performed by: FAMILY MEDICINE

## 2019-06-04 RX ORDER — ALPRAZOLAM 0.5 MG/1
0.5 TABLET ORAL NIGHTLY PRN
COMMUNITY
End: 2019-06-04 | Stop reason: SDUPTHER

## 2019-06-04 RX ORDER — ALPRAZOLAM 0.5 MG/1
0.5 TABLET ORAL
Qty: 30 TAB | Refills: 2 | Status: SHIPPED | OUTPATIENT
Start: 2019-06-15 | End: 2019-07-15

## 2019-06-04 RX ORDER — SERTRALINE HYDROCHLORIDE 100 MG/1
100 TABLET, FILM COATED ORAL DAILY
Qty: 90 TAB | Refills: 0 | Status: SHIPPED | OUTPATIENT
Start: 2019-06-04 | End: 2019-08-05 | Stop reason: SDUPTHER

## 2019-06-04 NOTE — ASSESSMENT & PLAN NOTE
Established problem, uncontrolled, please see notes from same day service 6/4/2019 regarding generalized anxiety disorder

## 2019-06-04 NOTE — PROGRESS NOTES
Subjective:   Chief Complaint/History of Present Illness:  Geremias Conte is a 28 y.o. male established patient who presents today to discuss medical problems as listed below    Diagnoses of OPAL (generalized anxiety disorder), Inappropriate sinus tachycardia, Overweight (BMI 25.0-29.9), Annual physical exam, and Mixed hyperlipidemia were pertinent to this visit.    OPAL (generalized anxiety disorder)  Chronic, uncontrolled, improving.  However, we discussed that we may need to increase her dosage of sertraline, and patient agrees that this is a good idea.    Patient states that he does intermittently still have panic attacks that can be provoked by work and stress, can also be brought on by physical symptoms specifically tachycardia or palpitations.  Therefore, we did review the results from his ZIO Patch, that while he did have some extra beats, that these occurred in singlets, and occurred so intermittently that they are not of concern.  Patient is reassured.    Patient states that panic attacks are better controlled with the use of Xanax.  We discussed that he should continue a combination of sertraline, metoprolol, and alprazolam.  Patient verbalized understanding that these are for different conditions, and the particular reasons why we are using them.    Patient does not have panic attacks today: ROS is NEGATIVE for generalized weakness/fatigue, dizziness, vision/hearing changes, chest pain/pressure, palpitations, dyspnea, tachypnea, intense feelings of dread, insomnia, decreased appetite, persistent nausea.    Inappropriate sinus tachycardia  Established problem, uncontrolled, please see notes from same day service 6/4/2019 regarding generalized anxiety disorder    Overweight (BMI 25.0-29.9)  Chronic, uncontrolled, patient advised to pursue lifestyle changes, particularly cardiovascular exercise and increasing proportion of plant-based nutrition.      Patient Active Problem List    Diagnosis Date Noted   •  "Inappropriate sinus tachycardia 08/08/2014     Priority: Medium   • Rash 04/15/2019   • H/O alcohol dependence (HCC) 04/15/2019   • Decreased hearing of both ears 02/19/2019   • Daytime hypersomnolence 10/04/2018   • Overweight (BMI 25.0-29.9) 02/27/2018   • OPAL (generalized anxiety disorder) 06/23/2017   • Mixed hyperlipidemia 06/12/2017   • Dyspepsia 08/05/2016       Additional History:   Allergies:    Lexapro     Current Medications:     Current Outpatient Prescriptions   Medication Sig Dispense Refill   • sertraline (ZOLOFT) 100 MG Tab Take 1 Tab by mouth every day. 90 Tab 0   • [START ON 6/15/2019] ALPRAZolam (XANAX) 0.5 MG Tab Take 1 Tab by mouth 1 time daily as needed for Sleep or Anxiety for up to 30 days. 30 Tab 2   • metoprolol (LOPRESSOR) 25 MG Tab Take 1 Tab by mouth 2 times a day. 180 Tab 5   • triamcinolone acetonide (KENALOG) 0.5 % Cream Apply three times daily to arm. 60 g 1     No current facility-administered medications for this visit.         Social History:     Social History   Substance Use Topics   • Smoking status: Never Smoker   • Smokeless tobacco: Never Used   • Alcohol use 3.6 oz/week     6 Cans of beer per week      Comment: mostly weekends (5-20beers; 12pack on avg)       ROS:     - NOTE: All other systems reviewed and are negative, except as in HPI.     Objective:   Physical Exam:    Vitals: /94 (BP Location: Left arm, Patient Position: Sitting, BP Cuff Size: Adult)   Pulse 78   Temp 36.8 °C (98.2 °F) (Temporal)   Resp 20   Ht 1.727 m (5' 8\")   Wt 92 kg (202 lb 13.2 oz)   SpO2 97%    BMI: Body mass index is 30.84 kg/m².   General/Constitutional: Vitals as above, Well nourished, well developed male in no acute distress   Head/Eyes: Head is grossly normal & atraumatic, bilateral conjunctivae clear and not injected, bilateral EOMI, bilateral PERRL   ENT: Bilateral external ears grossly normal in appearance, Hearing grossly intact, External nares normal in appearance and " without discharge/bleeding   Respiratory: No respiratory distress, bilateral lungs are clear to ausculation in all lung fields (anterior/lateral/posterior), no wheezing/rhonchi/rales   Cardiovascular: Regular rate and rhythm without murmur/gallops/rubs, distal pulses are intact and equal bilaterally (radial, posterior tibial), no bilateral lower extremity edema   MSK: Gait grossly normal & not antalgic   Integumentary: No apparent rashes   Psych: Judgment grossly appropriate, mildly to moderately apparent anxiety    Health Maintenance:     - NarxCheck is appropriate    Imaging/Labs:     - 04/17/19 -- CMP WNL    Assessment and Plan:   1. OPAL (generalized anxiety disorder)  2. Inappropriate sinus tachycardia  Chronic, uncontrolled, increase in sertraline to 100 mg daily.  Continue other medications as directed.   - sertraline (ZOLOFT) 100 MG Tab; Take 1 Tab by mouth every day.  Dispense: 90 Tab; Refill: 0   - Comp Metabolic Panel; Future   - ALPRAZolam (XANAX) 0.5 MG Tab; Take 1 Tab by mouth 1 time daily as needed for Sleep or Anxiety for up to 30 days.  Dispense: 30 Tab; Refill: 2    3. Overweight (BMI 25.0-29.9)  Chronic, uncontrolled, patient advised to pursue lifestyle changes, particularly cardiovascular exercise and increasing proportion of plant-based nutrition.    4. Annual physical exam  5. Mixed hyperlipidemia  Labs ordered for annual medical exam   - HEMOGLOBIN A1C; Future   - Comp Metabolic Panel; Future   - Lipid Profile; Future       RTC: in 3months for Annual Medical Exam, Anxiety management.    PLEASE NOTE: This dictation was created using voice recognition software. I have made every reasonable attempt to correct obvious errors, but I expect that there are errors of grammar and possibly content that I did not discover before finalizing the note.

## 2019-06-04 NOTE — ASSESSMENT & PLAN NOTE
Chronic, uncontrolled, improving.  However, we discussed that we may need to increase her dosage of sertraline, and patient agrees that this is a good idea.    Patient states that he does intermittently still have panic attacks that can be provoked by work and stress, can also be brought on by physical symptoms specifically tachycardia or palpitations.  Therefore, we did review the results from his ZIO Patch, that while he did have some extra beats, that these occurred in singlets, and occurred so intermittently that they are not of concern.  Patient is reassured.    Patient states that panic attacks are better controlled with the use of Xanax.  We discussed that he should continue a combination of sertraline, metoprolol, and alprazolam.  Patient verbalized understanding that these are for different conditions, and the particular reasons why we are using them.    Patient does not have panic attacks today: ROS is NEGATIVE for generalized weakness/fatigue, dizziness, vision/hearing changes, chest pain/pressure, palpitations, dyspnea, tachypnea, intense feelings of dread, insomnia, decreased appetite, persistent nausea.

## 2019-06-25 ENCOUNTER — HOSPITAL ENCOUNTER (OUTPATIENT)
Dept: LAB | Facility: MEDICAL CENTER | Age: 29
End: 2019-06-25
Attending: NURSE PRACTITIONER
Payer: COMMERCIAL

## 2019-06-25 ENCOUNTER — HOSPITAL ENCOUNTER (OUTPATIENT)
Dept: RADIOLOGY | Facility: MEDICAL CENTER | Age: 29
End: 2019-06-25
Attending: PHYSICIAN ASSISTANT
Payer: COMMERCIAL

## 2019-06-25 ENCOUNTER — OFFICE VISIT (OUTPATIENT)
Dept: URGENT CARE | Facility: PHYSICIAN GROUP | Age: 29
End: 2019-06-25
Payer: COMMERCIAL

## 2019-06-25 ENCOUNTER — HOSPITAL ENCOUNTER (OUTPATIENT)
Dept: LAB | Facility: MEDICAL CENTER | Age: 29
End: 2019-06-25
Attending: FAMILY MEDICINE
Payer: COMMERCIAL

## 2019-06-25 VITALS
RESPIRATION RATE: 18 BRPM | HEART RATE: 78 BPM | OXYGEN SATURATION: 96 % | WEIGHT: 205 LBS | TEMPERATURE: 97.3 F | BODY MASS INDEX: 31.07 KG/M2 | SYSTOLIC BLOOD PRESSURE: 142 MMHG | HEIGHT: 68 IN | DIASTOLIC BLOOD PRESSURE: 102 MMHG

## 2019-06-25 DIAGNOSIS — Z00.00 ANNUAL PHYSICAL EXAM: ICD-10-CM

## 2019-06-25 DIAGNOSIS — R06.02 SOB (SHORTNESS OF BREATH): Primary | ICD-10-CM

## 2019-06-25 DIAGNOSIS — E78.2 MIXED HYPERLIPIDEMIA: ICD-10-CM

## 2019-06-25 DIAGNOSIS — F41.1 GAD (GENERALIZED ANXIETY DISORDER): ICD-10-CM

## 2019-06-25 DIAGNOSIS — R07.89 CHEST TIGHTNESS: ICD-10-CM

## 2019-06-25 DIAGNOSIS — R06.02 SOB (SHORTNESS OF BREATH): ICD-10-CM

## 2019-06-25 LAB
ALBUMIN SERPL BCP-MCNC: 5 G/DL (ref 3.2–4.9)
ALBUMIN/GLOB SERPL: 1.6 G/DL
ALP SERPL-CCNC: 67 U/L (ref 30–99)
ALT SERPL-CCNC: 70 U/L (ref 2–50)
ANION GAP SERPL CALC-SCNC: 11 MMOL/L (ref 0–11.9)
AST SERPL-CCNC: 38 U/L (ref 12–45)
BILIRUB SERPL-MCNC: 0.6 MG/DL (ref 0.1–1.5)
BUN SERPL-MCNC: 11 MG/DL (ref 8–22)
CALCIUM SERPL-MCNC: 9.7 MG/DL (ref 8.5–10.5)
CHLORIDE SERPL-SCNC: 104 MMOL/L (ref 96–112)
CHOLEST SERPL-MCNC: 287 MG/DL (ref 100–199)
CHOLEST SERPL-MCNC: 297 MG/DL (ref 100–199)
CO2 SERPL-SCNC: 23 MMOL/L (ref 20–33)
CREAT SERPL-MCNC: 0.83 MG/DL (ref 0.5–1.4)
EST. AVERAGE GLUCOSE BLD GHB EST-MCNC: 105 MG/DL
FASTING STATUS PATIENT QL REPORTED: NORMAL
FASTING STATUS PATIENT QL REPORTED: NORMAL
GLOBULIN SER CALC-MCNC: 3.2 G/DL (ref 1.9–3.5)
GLUCOSE SERPL-MCNC: 85 MG/DL (ref 65–99)
HBA1C MFR BLD: 5.3 % (ref 0–5.6)
HDLC SERPL-MCNC: 41 MG/DL
HDLC SERPL-MCNC: 41 MG/DL
LDLC SERPL CALC-MCNC: ABNORMAL MG/DL
LDLC SERPL CALC-MCNC: ABNORMAL MG/DL
POTASSIUM SERPL-SCNC: 4 MMOL/L (ref 3.6–5.5)
PROT SERPL-MCNC: 8.2 G/DL (ref 6–8.2)
SODIUM SERPL-SCNC: 138 MMOL/L (ref 135–145)
T4 FREE SERPL-MCNC: 0.86 NG/DL (ref 0.53–1.43)
TRIGL SERPL-MCNC: 427 MG/DL (ref 0–149)
TRIGL SERPL-MCNC: 433 MG/DL (ref 0–149)
TSH SERPL DL<=0.005 MIU/L-ACNC: 2 UIU/ML (ref 0.38–5.33)

## 2019-06-25 PROCEDURE — 80061 LIPID PANEL: CPT

## 2019-06-25 PROCEDURE — 80053 COMPREHEN METABOLIC PANEL: CPT

## 2019-06-25 PROCEDURE — 83036 HEMOGLOBIN GLYCOSYLATED A1C: CPT

## 2019-06-25 PROCEDURE — 84439 ASSAY OF FREE THYROXINE: CPT

## 2019-06-25 PROCEDURE — 99214 OFFICE O/P EST MOD 30 MIN: CPT | Performed by: PHYSICIAN ASSISTANT

## 2019-06-25 PROCEDURE — 36415 COLL VENOUS BLD VENIPUNCTURE: CPT

## 2019-06-25 PROCEDURE — 71046 X-RAY EXAM CHEST 2 VIEWS: CPT

## 2019-06-25 PROCEDURE — 80061 LIPID PANEL: CPT | Mod: 91

## 2019-06-25 PROCEDURE — 84443 ASSAY THYROID STIM HORMONE: CPT

## 2019-06-25 RX ORDER — METHYLPREDNISOLONE 4 MG/1
TABLET ORAL
Qty: 21 TAB | Refills: 0 | Status: SHIPPED | OUTPATIENT
Start: 2019-06-25 | End: 2019-08-05

## 2019-06-25 RX ORDER — ALBUTEROL SULFATE 90 UG/1
2 AEROSOL, METERED RESPIRATORY (INHALATION) EVERY 6 HOURS PRN
Qty: 8.5 G | Refills: 0 | Status: SHIPPED | OUTPATIENT
Start: 2019-06-25 | End: 2019-07-05

## 2019-06-25 NOTE — PATIENT INSTRUCTIONS
Shortness of Breath  Shortness of breath means you have trouble breathing. Shortness of breath needs medical care right away.  HOME CARE   · Do not smoke.  · Avoid being around chemicals or things (paint fumes, dust) that may bother your breathing.  · Rest as needed. Slowly begin your normal activities.  · Only take medicines as told by your doctor.  · Keep all doctor visits as told.  GET HELP RIGHT AWAY IF:   · Your shortness of breath gets worse.  · You feel lightheaded, pass out (faint), or have a cough that is not helped by medicine.  · You cough up blood.  · You have pain with breathing.  · You have pain in your chest, arms, shoulders, or belly (abdomen).  · You have a fever.  · You cannot walk up stairs or exercise the way you normally do.  · You do not get better in the time expected.  · You have a hard time doing normal activities even with rest.  · You have problems with your medicines.  · You have any new symptoms.  MAKE SURE YOU:  · Understand these instructions.  · Will watch your condition.  · Will get help right away if you are not doing well or get worse.  This information is not intended to replace advice given to you by your health care provider. Make sure you discuss any questions you have with your health care provider.  Document Released: 06/05/2009 Document Revised: 12/23/2014 Document Reviewed: 03/04/2013  [employer] Interactive Patient Education © 2017 [employer]

## 2019-06-25 NOTE — PROGRESS NOTES
Subjective:      Pt is a 28 y.o. male who presents with Shortness of Breath (chest [id number])            HPI  This is a new problem. Pt notes 2 days of chest pressure with SOB and no illness symptoms he states. Pt notes hx of anxiety. Pt has not taken any Rx medications for this condition. Pt states the pain is a 5/10, aching in nature and worse during the day. Pt denies  NVD, paresthesias, headaches, dizziness, change in vision, hives, or other joint pain. The pt's medication list, problem list, and allergies have been evaluated and reviewed during today's visit.    PMH:  Past Medical History:   Diagnosis Date   • Anxiety 8/8/2014   • Inappropriate sinus tachycardia 8/8/2014       PSH:  Negative per pt.      Fam Hx:    family history includes Hypertension in his mother.  Family Status   Relation Status   • Mo Alive   • Fa Alive       Soc HX:  Social History     Social History   • Marital status:      Spouse name: N/A   • Number of children: N/A   • Years of education: N/A     Occupational History   • Not on file.     Social History Main Topics   • Smoking status: Never Smoker   • Smokeless tobacco: Never Used   • Alcohol use 3.6 oz/week     6 Cans of beer per week      Comment: mostly weekends (5-20beers; 12pack on avg)   • Drug use: No   • Sexual activity: Yes     Partners: Female      Comment: long-term GF, two children ([employer] fan), Snehla     Other Topics Concern   • Not on file     Social History Narrative   • No narrative on file         Medications:    Current Outpatient Prescriptions:   •  albuterol 108 (90 Base) MCG/ACT Aero Soln inhalation aerosol, Inhale 2 Puffs by mouth every 6 hours as needed for Shortness of Breath for up to 10 days., Disp: 8.5 g, Rfl: 0  •  methylPREDNISolone (MEDROL DOSEPAK) 4 MG Tablet Therapy Pack, Use as directed, Disp: 21 Tab, Rfl: 0  •  sertraline (ZOLOFT) 100 MG Tab, Take 1 Tab by mouth every day., Disp: 90 Tab, Rfl: 0  •  ALPRAZolam (XANAX) 0.5 MG Tab, Take 1 Tab  "by mouth 1 time daily as needed for Sleep or Anxiety for up to 30 days., Disp: 30 Tab, Rfl: 2      Allergies:  Lexapro    ROS  Constitutional: Negative for fever, chills and malaise/fatigue.   HENT: Negative for congestion and sore throat.    Eyes: Negative for blurred vision, double vision and photophobia.   Respiratory: POS shortness of breath.  Cardiovascular: POS chest tightness Negative for chest pain and palpitations.   Gastrointestinal: Negative for heartburn, nausea, vomiting, abdominal pain, diarrhea and constipation.   Genitourinary: Negative for dysuria and flank pain.   Musculoskeletal: Negative for joint pain and myalgias.   Skin: Negative for itching and rash.   Neurological: Negative for dizziness, tingling and headaches.   Endo/Heme/Allergies: Does not bruise/bleed easily.   Psychiatric/Behavioral: Negative for depression. The patient is not nervous/anxious.           Objective:     /102   Pulse 78   Temp 36.3 °C (97.3 °F) (Temporal)   Resp 18   Ht 1.727 m (5' 8\")   Wt 93 kg (205 lb)   SpO2 96%   BMI 31.17 kg/m²      Physical Exam       Constitutional: PT is oriented to person, place, and time. PT appears well-developed and well-nourished. No distress.   HENT:   Head: Normocephalic and atraumatic.   Mouth/Throat: Oropharynx is clear and moist. No oropharyngeal exudate.   Eyes: Conjunctivae normal and EOM are normal. Pupils are equal, round, and reactive to light.   Neck: Normal range of motion. Neck supple. No thyromegaly present.   Cardiovascular: Normal rate, regular rhythm, normal heart sounds and intact distal pulses.  Exam reveals no gallop and no friction rub.    No murmur heard.  Pulmonary/Chest: Effort normal and breath sounds normal. No respiratory distress. PT has no wheezes. PT has no rales. Pt exhibits no tenderness.   Abdominal: Soft. Bowel sounds are normal. PT exhibits no distension and no mass. There is no tenderness. There is no rebound and no guarding. "   Musculoskeletal: Normal range of motion. PT exhibits no edema and no tenderness.   Neurological: PT is alert and oriented to person, place, and time. PT has normal reflexes. No cranial nerve deficit.   Skin: Skin is warm and dry. No rash noted. PT is not diaphoretic. No erythema.       Psychiatric: PT has a normal mood and affect. PT behavior is normal. Judgment and thought content normal.     RADS:  Narrative       6/25/2019 12:08 PM    HISTORY/REASON FOR EXAM:  Shortness of Breath.      TECHNIQUE/EXAM DESCRIPTION AND NUMBER OF VIEWS:  Two views of the chest.    COMPARISON:  4/17/2019    FINDINGS:  The heart is normal in size.  No pulmonary infiltrates or consolidations are noted.  No pleural effusions are appreciated.  No pneumothorax is present.   Impression       Negative two views of the chest.   Reading Provider Reading Date   Vinayak Serrano M.D. Jun 25, 2019   Signing Provider Signing Date Signing Time   Vinayak Serrano M.D. Jun 25, 2019 12:14 PM            Assessment/Plan:     1. SOB (shortness of breath)    - DX-CHEST-2 VIEWS; Future  - albuterol 108 (90 Base) MCG/ACT Aero Soln inhalation aerosol; Inhale 2 Puffs by mouth every 6 hours as needed for Shortness of Breath for up to 10 days.  Dispense: 8.5 g; Refill: 0  - methylPREDNISolone (MEDROL DOSEPAK) 4 MG Tablet Therapy Pack; Use as directed  Dispense: 21 Tab; Refill: 0    2. Chest tightness    - EKG  - albuterol 108 (90 Base) MCG/ACT Aero Soln inhalation aerosol; Inhale 2 Puffs by mouth every 6 hours as needed for Shortness of Breath for up to 10 days.  Dispense: 8.5 g; Refill: 0  - methylPREDNISolone (MEDROL DOSEPAK) 4 MG Tablet Therapy Pack; Use as directed  Dispense: 21 Tab; Refill: 0    3. OPAL (generalized anxiety disorder)      EKG-->NSR  CXR-->WNL  OPAL-->follow with PCP protocol set forth on 6/2/19  Rest, fluids encouraged.  AVS with medical info given.  Pt was in full understanding and agreement with the plan.  Differential diagnosis,  natural history, supportive care, and indications for immediate follow-up discussed. All questions answered. Patient agrees with the plan of care.  Follow-up as needed if symptoms worsen or fail to improve.

## 2019-06-28 ENCOUNTER — TELEPHONE (OUTPATIENT)
Dept: CARDIOLOGY | Facility: MEDICAL CENTER | Age: 29
End: 2019-06-28

## 2019-06-28 NOTE — TELEPHONE ENCOUNTER
----- Message from LUIZ Torrez sent at 6/27/2019  4:28 PM PDT -----  Refer him back to PCP for evaluation of Type IV mixed hyperlipidemia

## 2019-07-03 ENCOUNTER — OFFICE VISIT (OUTPATIENT)
Dept: URGENT CARE | Facility: CLINIC | Age: 29
End: 2019-07-03
Payer: COMMERCIAL

## 2019-07-03 VITALS
OXYGEN SATURATION: 97 % | HEART RATE: 85 BPM | TEMPERATURE: 98.7 F | HEIGHT: 69 IN | WEIGHT: 199 LBS | BODY MASS INDEX: 29.47 KG/M2 | SYSTOLIC BLOOD PRESSURE: 122 MMHG | RESPIRATION RATE: 16 BRPM | DIASTOLIC BLOOD PRESSURE: 82 MMHG

## 2019-07-03 DIAGNOSIS — L03.314 CELLULITIS OF GROIN: ICD-10-CM

## 2019-07-03 PROCEDURE — 99214 OFFICE O/P EST MOD 30 MIN: CPT | Performed by: PHYSICIAN ASSISTANT

## 2019-07-03 RX ORDER — CLINDAMYCIN HYDROCHLORIDE 300 MG/1
300 CAPSULE ORAL 3 TIMES DAILY
Qty: 30 CAP | Refills: 0 | Status: SHIPPED | OUTPATIENT
Start: 2019-07-03 | End: 2019-07-13

## 2019-07-03 ASSESSMENT — ENCOUNTER SYMPTOMS
CHILLS: 0
COUGH: 0
FLANK PAIN: 0
NERVOUS/ANXIOUS: 0
SHORTNESS OF BREATH: 0
VOMITING: 0
FEVER: 0
SORE THROAT: 0
DIZZINESS: 0
NAUSEA: 0
BACK PAIN: 0
HEADACHES: 0
ABDOMINAL PAIN: 0

## 2019-07-03 NOTE — PROGRESS NOTES
"Subjective:   Geremias Conte is a 28 y.o. 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   HENT: Negative for congestion and sore throat.    Respiratory: Negative for cough and shortness of breath.    Cardiovascular: Negative for chest pain.   Gastrointestinal: Negative for abdominal pain, nausea and vomiting.   Genitourinary: Negative for dysuria, flank pain and hematuria.   Skin: Negative for itching and rash.      Objective:   /82   Pulse 85   Temp 37.1 °C (98.7 °F) (Temporal)   Resp 16   Ht 1.753 m (5' 9\")   Wt 90.3 kg (199 lb)   SpO2 97%   BMI 29.39 kg/m²   Physical Exam     Assessment/Plan:   There are no diagnoses linked to this encounter.  Differential diagnosis, natural history, supportive care, and indications for immediate follow-up discussed.  "

## 2019-07-03 NOTE — PROGRESS NOTES
"Subjective:   eGremias Conte is a 28 y.o. male who presents for Other (bump on the grion area inner thighs, x 1 days)    Patient c/o bump in left groin onset today  Pain is worse with walking and rated at 6/7, resolved when sitting  Pain to touch, +redness, no discharge   Pt has not tried anything for pain  Sexual active with female partner  No urinary symptoms   No hx of same   No hx of abdominal surgeries or hernia      HPI  Review of Systems   Constitutional: Negative for chills and fever.   HENT: Negative for congestion and sore throat.    Respiratory: Negative for cough and shortness of breath.    Cardiovascular: Negative for chest pain.   Gastrointestinal: Negative for abdominal pain, nausea and vomiting.   Genitourinary: Negative for dysuria, flank pain, frequency, hematuria and urgency.   Musculoskeletal: Negative for back pain.   Skin: Negative for itching and rash.   Neurological: Negative for dizziness and headaches.   Psychiatric/Behavioral: The patient is not nervous/anxious.      Allergies   Allergen Reactions   • Lexapro Unspecified     Made patient more anxious   Current medications reviewed.     Objective:   /82   Pulse 85   Temp 37.1 °C (98.7 °F) (Temporal)   Resp 16   Ht 1.753 m (5' 9\")   Wt 90.3 kg (199 lb)   SpO2 97%   BMI 29.39 kg/m²   Physical Exam   Constitutional: He is oriented to person, place, and time. He appears well-developed and well-nourished.   HENT:   Head: Normocephalic and atraumatic.   Mouth/Throat: Oropharynx is clear and moist.   Eyes: EOM are normal.   Neck: Normal range of motion. Neck supple.   Cardiovascular: Normal rate.    Pulmonary/Chest: Effort normal and breath sounds normal. No respiratory distress.   Abdominal: Soft. He exhibits no distension. There is tenderness in the suprapubic area.       Musculoskeletal: Normal range of motion.   Neurological: He is alert and oriented to person, place, and time.   Skin: Skin is warm. There is erythema.        Area of " induration measuring about 2x2cm with surrounding erythema. +tenderness to palpation. No fluctuants    Psychiatric: He has a normal mood and affect. His behavior is normal. Judgment and thought content normal.   Vitals reviewed.        Assessment/Plan:   1. Cellulitis of groin  - clindamycin (CLEOCIN) 300 MG Cap; Take 1 Cap by mouth 3 times a day for 10 days.  Dispense: 30 Cap; Refill: 0    Take clindamycin with probiotics. Caution with risk of c.diff. Watch for signs of diarrhea  Recommend OTC ibuprofen for pain and inflammation. Take with food.   Warm compresses to area 2-3 times per day.     Differential diagnosis, natural history, supportive care, and indications for immediate follow-up discussed.    PT should follow up with PCP in 2-3 days for re-evaluation if symptoms have not improved.  Discussed red flags and reasons to return to UC or ED, including development of fever/chills or worsening of symptoms.  Pt and/or family verbalized understanding of diagnosis and follow up instructions and was offered informational handout on diagnosis.  PT discharged.

## 2019-08-05 ENCOUNTER — HOSPITAL ENCOUNTER (OUTPATIENT)
Facility: MEDICAL CENTER | Age: 29
End: 2019-08-05
Attending: FAMILY MEDICINE
Payer: COMMERCIAL

## 2019-08-05 ENCOUNTER — OFFICE VISIT (OUTPATIENT)
Dept: MEDICAL GROUP | Facility: MEDICAL CENTER | Age: 29
End: 2019-08-05
Payer: COMMERCIAL

## 2019-08-05 VITALS
HEIGHT: 69 IN | DIASTOLIC BLOOD PRESSURE: 80 MMHG | HEART RATE: 89 BPM | SYSTOLIC BLOOD PRESSURE: 134 MMHG | OXYGEN SATURATION: 98 % | WEIGHT: 195 LBS | TEMPERATURE: 98.4 F | RESPIRATION RATE: 18 BRPM | BODY MASS INDEX: 28.88 KG/M2

## 2019-08-05 DIAGNOSIS — R74.01 TRANSAMINITIS: ICD-10-CM

## 2019-08-05 DIAGNOSIS — F41.1 GAD (GENERALIZED ANXIETY DISORDER): ICD-10-CM

## 2019-08-05 DIAGNOSIS — E78.2 MIXED HYPERLIPIDEMIA: ICD-10-CM

## 2019-08-05 DIAGNOSIS — F10.21 H/O ALCOHOL DEPENDENCE (HCC): ICD-10-CM

## 2019-08-05 DIAGNOSIS — E66.3 OVERWEIGHT (BMI 25.0-29.9): ICD-10-CM

## 2019-08-05 DIAGNOSIS — I47.11 INAPPROPRIATE SINUS TACHYCARDIA: Chronic | ICD-10-CM

## 2019-08-05 PROBLEM — R21 RASH: Status: RESOLVED | Noted: 2019-04-15 | Resolved: 2019-08-05

## 2019-08-05 PROCEDURE — 80307 DRUG TEST PRSMV CHEM ANLYZR: CPT

## 2019-08-05 PROCEDURE — G0480 DRUG TEST DEF 1-7 CLASSES: HCPCS

## 2019-08-05 PROCEDURE — 99214 OFFICE O/P EST MOD 30 MIN: CPT | Performed by: FAMILY MEDICINE

## 2019-08-05 RX ORDER — SERTRALINE HYDROCHLORIDE 100 MG/1
150 TABLET, FILM COATED ORAL DAILY
Qty: 135 TAB | Refills: 0 | Status: SHIPPED | OUTPATIENT
Start: 2019-08-05 | End: 2019-12-23 | Stop reason: SDUPTHER

## 2019-08-05 RX ORDER — ALPRAZOLAM 0.5 MG/1
0.5 TABLET ORAL
Qty: 30 TAB | Refills: 0 | Status: SHIPPED | OUTPATIENT
Start: 2019-08-05 | End: 2019-08-05 | Stop reason: SDUPTHER

## 2019-08-05 RX ORDER — ALPRAZOLAM 0.5 MG/1
0.5 TABLET ORAL
Qty: 30 TAB | Refills: 0 | Status: SHIPPED | OUTPATIENT
Start: 2019-10-04 | End: 2019-09-09 | Stop reason: SDUPTHER

## 2019-08-05 RX ORDER — ALPRAZOLAM 0.5 MG/1
0.5 TABLET ORAL
Qty: 30 TAB | Refills: 0 | Status: SHIPPED | OUTPATIENT
Start: 2019-09-04 | End: 2019-08-05 | Stop reason: SDUPTHER

## 2019-08-05 NOTE — ASSESSMENT & PLAN NOTE
Chronic, uncontrolled, improving.  However, patient still has some binge drinking behaviors on the weekends.  Please see notes from same date of service 8/5/2019 regarding generalized anxiety disorder for further details.

## 2019-08-05 NOTE — ASSESSMENT & PLAN NOTE
New problem for medical evaluation, uncontrolled, determined by recent labs.  We discussed that this is likely related to the same reasons causing him to have hypertriglyceridemia.  Please see notes from same date of service 8/5/2019 regarding mixed hyperlipidemia.

## 2019-08-05 NOTE — PROGRESS NOTES
Subjective:   Chief Complaint/History of Present Illness:  Geremias Conte is a 28 y.o. male established patient who presents today to discuss medical problems as listed below    Diagnoses of OPAL (generalized anxiety disorder), Inappropriate sinus tachycardia, H/O alcohol dependence (HCC), Overweight (BMI 25.0-29.9), Mixed hyperlipidemia, and Transaminitis were pertinent to this visit.    OPAL (generalized anxiety disorder)  Chronic, uncontrolled, improving on Sertraline 100mg daily.  However, patient continues to take Alprazolam 0.5mg once daily.  He has had episodes of panic attack, and once he had to go to urgent care due to running out of Alprazolam.    Patient continues to drink alcohol, about 10beers over 1-2days on the weekend.  He may also drink after work when at home.      ROS is NEGATIVE for dizziness, generalized weakness/fatigue, cold sweats,  vision/hearing changes, jaw pain/paresthesias, BUE pain/paresthesias/numbness/weakness, chest pain/pressure, palpitations, dyspnea, nausea, RUQ abdominal pain, oliguria/anuria, BLE edema.    ROS is NEGATIVE for generalized weakness/fatigue, dizziness, vision/hearing changes, chest pain/pressure, palpitations, dyspnea, tachypnea, intense feelings of dread, insomnia, decreased appetite, persistent nausea.    Inappropriate sinus tachycardia  Chronic, stable, well-controlled, no longer taking medication specifically for tachycardia, however he is still taking sertraline for anxiety.  Therefore, please see notes from same day service 8/5/2019 regarding generalized anxiety disorder.    (HCC) H/O alcohol dependence  Chronic, uncontrolled, improving.  However, patient still has some binge drinking behaviors on the weekends.  Please see notes from same date of service 8/5/2019 regarding generalized anxiety disorder for further details.    Overweight (BMI 25.0-29.9)  Chronic, uncontrolled, patient advised to pursue lifestyle changes, particularly cardiovascular exercise and  increasing proportion of plant-based nutrition.    Mixed hyperlipidemia  Patient and I discussed recent labs (see below; mixed dyslipidemia, uncontrolled with HTG and high total cholesterol).    Patient and I then discussed necessary dietary changes to make to address dyslipidemia.  Patient is NOT currently taking cholesterol lowering medication.  Patient verbalized understanding.    Lab Results   Component Value Date/Time    CHOLSTRLTOT 297 (H) 06/25/2019 12:27 PM    LDL see below 06/25/2019 12:27 PM    HDL 41 06/25/2019 12:27 PM    TRIGLYCERIDE 433 (H) 06/25/2019 12:27 PM       Transaminitis  New problem for medical evaluation, uncontrolled, determined by recent labs.  We discussed that this is likely related to the same reasons causing him to have hypertriglyceridemia.  Please see notes from same date of service 8/5/2019 regarding mixed hyperlipidemia.      Patient Active Problem List    Diagnosis Date Noted   • Transaminitis 08/05/2019   • ([facility]) H/O alcohol dependence 04/15/2019   • Decreased hearing of both ears 02/19/2019   • Daytime hypersomnolence 10/04/2018   • Overweight (BMI 25.0-29.9) 02/27/2018   • OPAL (generalized anxiety disorder) 06/23/2017   • Mixed hyperlipidemia 06/12/2017   • Dyspepsia 08/05/2016   • Inappropriate sinus tachycardia 08/08/2014       Additional History:   Allergies:    Lexapro     Current Medications:     Current Outpatient Medications   Medication Sig Dispense Refill   • sertraline (ZOLOFT) 100 MG Tab Take 1.5 Tabs by mouth every day. 135 Tab 0   • [START ON 10/4/2019] ALPRAZolam (XANAX) 0.5 MG Tab Take 1 Tab by mouth 1 time daily as needed for Anxiety for up to 30 days. 30 Tab 0     No current facility-administered medications for this visit.         Social History:     Social History     Tobacco Use   • Smoking status: Never Smoker   • Smokeless tobacco: Never Used   Substance Use Topics   • Alcohol use: Yes     Alcohol/week: 3.6 oz     Types: 6 Cans of beer per week      "Comment: mostly weekends (5-20beers; 12pack on avg)   • Drug use: No       ROS:     - NOTE: All other systems reviewed and are negative, except as in HPI.     Objective:   Physical Exam:    Vitals: /80 (BP Location: Left arm, Patient Position: Sitting, BP Cuff Size: Adult)   Pulse 89   Temp 36.9 °C (98.4 °F) (Temporal)   Resp 18   Ht 1.753 m (5' 9\")   Wt 88.5 kg (195 lb)   SpO2 98%    BMI: Body mass index is 28.8 kg/m².   General/Constitutional: Vitals as above, Well nourished, well developed male in no acute distress   Head/Eyes: Head is grossly normal & atraumatic, bilateral conjunctivae clear and not injected, bilateral EOMI, bilateral PERRL   ENT: Bilateral external ears grossly normal in appearance, Hearing grossly intact, External nares normal in appearance and without discharge/bleeding   Respiratory: No respiratory distress, bilateral lungs are clear to ausculation in all lung fields (anterior/lateral/posterior), no wheezing/rhonchi/rales   Cardiovascular: Regular rate and rhythm without murmur/gallops/rubs, distal pulses are intact and equal bilaterally (radial, posterior tibial), no bilateral lower extremity edema   MSK: Gait grossly normal & not antalgic   Integumentary: No apparent rashes   Psych: Judgment grossly appropriate, mild to moderate apparent anxiety    Health Maintenance:     - NarxCheck is appropriate    Imaging/Labs:     - UDS is due now    - CSA will be printed and signed    Assessment and Plan:   1. OPAL (generalized anxiety disorder)  Chronic, uncontrolled, therefore will increase sertraline to 150 mg p.o. daily.  Patient can continue take alprazolam on an as-needed daily basis  - sertraline (ZOLOFT) 100 MG Tab; Take 1.5 Tabs by mouth every day.  Dispense: 135 Tab; Refill: 0  - ALPRAZolam (XANAX) 0.5 MG Tab; Take 1 Tab by mouth 1 time daily as needed for Anxiety for up to 30 days.  Dispense: 30 Tab; Refill: 0  - PAIN MANAGEMENT SCRN, W/ RFLX TO QNT; Future  - Controlled " Substance Treatment Agreement    2. Inappropriate sinus tachycardia  Chronic, stable, well-controlled.  Continue to take sertraline for control this condition.   - PAIN MANAGEMENT SCRN, W/ RFLX TO QNT; Future   - Controlled Substance Treatment Agreement    3. H/O alcohol dependence (HCC)  Chronic, uncontrolled, improving.  Patient to continue working on decreasing alcohol intake.    4. Overweight (BMI 25.0-29.9)  Chronic, uncontrolled, patient advised to pursue lifestyle changes, particularly cardiovascular exercise and increasing proportion of plant-based nutrition.    5. Mixed hyperlipidemia  Chronic, uncontrolled, patient advised to pursue lifestyle changes, particularly cardiovascular exercise and increasing proportion of plant-based nutrition.'   - HEPATIC FUNCTION PANEL; Future   - Lipid Profile; Future   - CRP HIGH SENSITIVE (CARDIAC); Future   - LIPOPROTEIN A; Future   - HOMOCYSTEINE; Future    6. Transaminitis  Chronic, uncontrolled, patient advised to pursue lifestyle changes, particularly cardiovascular exercise and increasing proportion of plant-based nutrition.   - HEPATIC FUNCTION PANEL; Future        RTC: in 3months for anxiety + cholesterol management.    PLEASE NOTE: This dictation was created using voice recognition software. I have made every reasonable attempt to correct obvious errors, but I expect that there are errors of grammar and possibly content that I did not discover before finalizing the note.

## 2019-08-05 NOTE — ASSESSMENT & PLAN NOTE
Chronic, stable, well-controlled, no longer taking medication specifically for tachycardia, however he is still taking sertraline for anxiety.  Therefore, please see notes from same day service 8/5/2019 regarding generalized anxiety disorder.

## 2019-08-05 NOTE — ASSESSMENT & PLAN NOTE
Patient and I discussed recent labs (see below; mixed dyslipidemia, uncontrolled with HTG and high total cholesterol).    Patient and I then discussed necessary dietary changes to make to address dyslipidemia.  Patient is NOT currently taking cholesterol lowering medication.  Patient verbalized understanding.    Lab Results   Component Value Date/Time    CHOLSTRLTOT 297 (H) 06/25/2019 12:27 PM    LDL see below 06/25/2019 12:27 PM    HDL 41 06/25/2019 12:27 PM    TRIGLYCERIDE 433 (H) 06/25/2019 12:27 PM

## 2019-08-05 NOTE — ASSESSMENT & PLAN NOTE
Chronic, uncontrolled, improving on Sertraline 100mg daily.  However, patient continues to take Alprazolam 0.5mg once daily.  He has had episodes of panic attack, and once he had to go to urgent care due to running out of Alprazolam.    Patient continues to drink alcohol, about 10beers over 1-2days on the weekend.  He may also drink after work when at home.      ROS is NEGATIVE for dizziness, generalized weakness/fatigue, cold sweats,  vision/hearing changes, jaw pain/paresthesias, BUE pain/paresthesias/numbness/weakness, chest pain/pressure, palpitations, dyspnea, nausea, RUQ abdominal pain, oliguria/anuria, BLE edema.    ROS is NEGATIVE for generalized weakness/fatigue, dizziness, vision/hearing changes, chest pain/pressure, palpitations, dyspnea, tachypnea, intense feelings of dread, insomnia, decreased appetite, persistent nausea.

## 2019-08-06 DIAGNOSIS — F41.1 GAD (GENERALIZED ANXIETY DISORDER): ICD-10-CM

## 2019-08-06 DIAGNOSIS — I47.11 INAPPROPRIATE SINUS TACHYCARDIA: Chronic | ICD-10-CM

## 2019-08-08 LAB
AMPHET CTO UR CFM-MCNC: NEGATIVE NG/ML
BARBITURATES CTO UR CFM-MCNC: NEGATIVE NG/ML
BENZODIAZ CTO UR CFM-MCNC: NEGATIVE NG/ML
BUPRENORPHINE UR-MCNC: NEGATIVE NG/ML
CANNABINOIDS CTO UR CFM-MCNC: NEGATIVE NG/ML
CARISOPRODOL UR-MCNC: NEGATIVE NG/ML
COCAINE CTO UR CFM-MCNC: NEGATIVE NG/ML
DRUG SCREEN COMMENT UR-IMP: NORMAL
ETHYL GLUCURONIDE UR QL SCN: POSITIVE NG/ML
FENTANYL UR-MCNC: NEGATIVE NG/ML
MEPERIDINE CTO UR SCN-MCNC: NEGATIVE NG/ML
METHADONE CTO UR CFM-MCNC: NEGATIVE NG/ML
OPIATES UR QL SCN: NEGATIVE NG/ML
OXYCDOXYM URSCRN 2005102: NEGATIVE NG/ML
PCP CTO UR CFM-MCNC: NEGATIVE NG/ML
PROPOXYPH CTO UR CFM-MCNC: NEGATIVE NG/ML
TAPENTADOL UR-MCNC: NEGATIVE NG/ML
TRAMADOL CTO UR SCN-MCNC: NEGATIVE NG/ML
ZOLPIDEM UR-MCNC: NEGATIVE NG/ML

## 2019-08-12 LAB
ETHYL GLUCURONIDE UR CFM-MCNC: NORMAL NG/ML
ETHYL SULFATE UR CFM-MCNC: 3510 NG/ML

## 2019-09-09 ENCOUNTER — OFFICE VISIT (OUTPATIENT)
Dept: MEDICAL GROUP | Facility: MEDICAL CENTER | Age: 29
End: 2019-09-09
Payer: COMMERCIAL

## 2019-09-09 VITALS
OXYGEN SATURATION: 96 % | SYSTOLIC BLOOD PRESSURE: 132 MMHG | HEART RATE: 82 BPM | BODY MASS INDEX: 29.55 KG/M2 | HEIGHT: 69 IN | DIASTOLIC BLOOD PRESSURE: 88 MMHG | RESPIRATION RATE: 16 BRPM | TEMPERATURE: 98.1 F | WEIGHT: 199.52 LBS

## 2019-09-09 DIAGNOSIS — F41.1 GAD (GENERALIZED ANXIETY DISORDER): ICD-10-CM

## 2019-09-09 DIAGNOSIS — Z76.89 ENCOUNTER TO ESTABLISH CARE: ICD-10-CM

## 2019-09-09 DIAGNOSIS — I47.11 INAPPROPRIATE SINUS TACHYCARDIA: Chronic | ICD-10-CM

## 2019-09-09 DIAGNOSIS — I10 ESSENTIAL HYPERTENSION: ICD-10-CM

## 2019-09-09 DIAGNOSIS — E78.2 MIXED HYPERLIPIDEMIA: ICD-10-CM

## 2019-09-09 PROCEDURE — 99214 OFFICE O/P EST MOD 30 MIN: CPT | Performed by: PHYSICIAN ASSISTANT

## 2019-09-09 RX ORDER — ALPRAZOLAM 0.5 MG/1
0.5 TABLET ORAL
Qty: 30 TAB | Refills: 2 | Status: SHIPPED | OUTPATIENT
Start: 2019-10-04 | End: 2019-12-23 | Stop reason: SDUPTHER

## 2019-09-09 NOTE — PROGRESS NOTES
Subjective:   Geremias Conte is a 28 y.o. male here today for establish care, anxiety and tachycardia.  Has history of alcohol use which appears to be affecting his anxiety.    OPAL (generalized anxiety disorder)  This is a 28-year-old male who is here today with his wife to establish care.  He has a history of anxiety.  Is on Zoloft 50 mg daily.  Medication has been gradually increased by his previous PCP.  He states over the weekend he was drinking because of his heart as well as his anxiety.  He got anxious and his heart was racing.  Drink 5-6 beers.  Help them out.  He realizes that alcohol does affect his anxiety and vice versa.  Has tried to stop alcohol because as well of abnormal liver enzymes and elevated triglycerides in the 400s.  He has been told to stop his job at [employer] but he has nothing back up yet.  States that his is high stress because of what is requested.  He has several boxes and needs to meet their demands daily.    Inappropriate sinus tachycardia  Chronic condition.  Had a run past week where his heart was racing.  In the past was on propanolol and metoprolol.  Metoprolol had been effective.  He is on 25 mg twice a day.  He did see a cardiologist.  Tachycardia noted.  No arrhythmias.       Current medicines (including changes today)  Current Outpatient Medications   Medication Sig Dispense Refill   • [START ON 10/4/2019] ALPRAZolam (XANAX) 0.5 MG Tab Take 1 Tab by mouth 1 time daily as needed for Anxiety for up to 30 days. 30 Tab 2   • metoprolol (LOPRESSOR) 25 MG Tab Take 0.5 Tabs by mouth 2 times a day. 180 Tab 0   • sertraline (ZOLOFT) 100 MG Tab Take 1.5 Tabs by mouth every day. 135 Tab 0     No current facility-administered medications for this visit.      He  has a past medical history of Anxiety (8/8/2014) and Inappropriate sinus tachycardia (8/8/2014).    Social History and Family History were reviewed and updated.    ROS   No chest pain, no shortness of breath, no abdominal pain and all  "other systems were reviewed and are negative.       Objective:     /88 (BP Location: Left arm, Patient Position: Sitting, BP Cuff Size: Adult)   Pulse 82   Temp 36.7 °C (98.1 °F) (Temporal)   Resp 16   Ht 1.753 m (5' 9\")   Wt 90.5 kg (199 lb 8.3 oz)   SpO2 96%  Body mass index is 29.46 kg/m².   Physical Exam:  Constitutional: Alert, no distress.  Skin: Warm, dry, good turgor, no rashes in visible areas.  Eye: Equal, round and reactive, conjunctiva clear, lids normal.  ENMT: Lips without lesions, good dentition, oropharynx clear.  Neck: Trachea midline, no masses.   Lymph: No cervical or supraclavicular lymphadenopathy  Respiratory: Unlabored respiratory effort, lungs clear to auscultation, no wheezes, no ronchi.  Cardiovascular: Normal S1, S2, no murmur, no edema.  Abdomen: Soft, non-tender, no masses.  Psych: Alert and oriented x3, normal affect and mood.        Assessment and Plan:   The following treatment plan was discussed    1. OPAL (generalized anxiety disorder)  Chronic condition.  Refer to psychiatry given his alcohol use.   was reviewed.  No refill needed today of alprazolam.  Wrote a prescription for next month.  0.5 mg daily as needed.  Advised to continue Zoloft.  Did not increase his medication today.  - ALPRAZolam (XANAX) 0.5 MG Tab; Take 1 Tab by mouth 1 time daily as needed for Anxiety for up to 30 days.  Dispense: 30 Tab; Refill: 2  - REFERRAL TO PSYCHIATRY    2. Inappropriate sinus tachycardia  Chronic condition.  Benign.  Also triggers anxiety.  Advised him to restart metoprolol at 12.5 mg twice a day.  Renewed the 25 mg she may cut it in half.  - metoprolol (LOPRESSOR) 25 MG Tab; Take 0.5 Tabs by mouth 2 times a day.  Dispense: 180 Tab; Refill: 0    3. Mixed hyperlipidemia  Acute, new onset condition.  Advised watch alcohol intake.  Exercise routinely.  Watch processed food intake.  Advised him to obtain labs ordered by his previous PCP.  Possibly perform them in 1 month after no " alcohol use.      Followup: Return in about 3 months (around 12/9/2019), or if symptoms worsen or fail to improve.    Please note that this dictation was created using voice recognition software. I have made every reasonable attempt to correct obvious errors, but I expect that there are errors of grammar and possibly content that I did not discover before finalizing the note.

## 2019-09-09 NOTE — ASSESSMENT & PLAN NOTE
This is a 28-year-old male who is here today with his wife to establish care.  He has a history of anxiety.  Is on Zoloft 50 mg daily.  Medication has been gradually increased by his previous PCP.  He states over the weekend he was drinking because of his heart as well as his anxiety.  He got anxious and his heart was racing.  Drink 5-6 beers.  Help them out.  He realizes that alcohol does affect his anxiety and vice versa.  Has tried to stop alcohol because as well of abnormal liver enzymes and elevated triglycerides in the 400s.  He has been told to stop his job at [employer] but he has nothing back up yet.  States that his is high stress because of what is requested.  He has several boxes and needs to meet their demands daily.

## 2019-09-09 NOTE — ASSESSMENT & PLAN NOTE
Chronic condition.  Had a run past week where his heart was racing.  In the past was on propanolol and metoprolol.  Metoprolol had been effective.  He is on 25 mg twice a day.  He did see a cardiologist.  Tachycardia noted.  No arrhythmias.

## 2019-12-23 ENCOUNTER — OFFICE VISIT (OUTPATIENT)
Dept: MEDICAL GROUP | Facility: MEDICAL CENTER | Age: 29
End: 2019-12-23
Payer: COMMERCIAL

## 2019-12-23 VITALS
HEIGHT: 69 IN | DIASTOLIC BLOOD PRESSURE: 82 MMHG | HEART RATE: 80 BPM | BODY MASS INDEX: 29.92 KG/M2 | RESPIRATION RATE: 16 BRPM | OXYGEN SATURATION: 95 % | TEMPERATURE: 98.6 F | SYSTOLIC BLOOD PRESSURE: 144 MMHG | WEIGHT: 202 LBS

## 2019-12-23 DIAGNOSIS — I47.11 INAPPROPRIATE SINUS TACHYCARDIA: Chronic | ICD-10-CM

## 2019-12-23 DIAGNOSIS — G47.19 DAYTIME HYPERSOMNOLENCE: ICD-10-CM

## 2019-12-23 DIAGNOSIS — F41.1 GAD (GENERALIZED ANXIETY DISORDER): ICD-10-CM

## 2019-12-23 DIAGNOSIS — H91.93 DECREASED HEARING OF BOTH EARS: ICD-10-CM

## 2019-12-23 PROBLEM — E66.3 OVERWEIGHT (BMI 25.0-29.9): Status: RESOLVED | Noted: 2018-02-27 | Resolved: 2019-12-23

## 2019-12-23 PROCEDURE — 99214 OFFICE O/P EST MOD 30 MIN: CPT | Performed by: PHYSICIAN ASSISTANT

## 2019-12-23 RX ORDER — ALPRAZOLAM 0.5 MG/1
TABLET ORAL
COMMUNITY
Start: 2019-11-04 | End: 2019-12-23

## 2019-12-23 RX ORDER — SERTRALINE HYDROCHLORIDE 100 MG/1
150 TABLET, FILM COATED ORAL DAILY
Qty: 135 TAB | Refills: 0 | Status: SHIPPED | OUTPATIENT
Start: 2019-12-23 | End: 2020-03-16 | Stop reason: SDUPTHER

## 2019-12-23 RX ORDER — ALPRAZOLAM 0.5 MG/1
0.5 TABLET ORAL
Qty: 30 TAB | Refills: 2 | Status: SHIPPED | OUTPATIENT
Start: 2019-12-23 | End: 2020-01-22

## 2019-12-23 NOTE — ASSESSMENT & PLAN NOTE
Chronic condition.  Currently is off the Lopressor.  Was taking 25 mg half a tablet twice daily.  Denies any recent issues with chest pain.  No irregular heart rates noted.

## 2019-12-23 NOTE — ASSESSMENT & PLAN NOTE
This is a 29-year-old male accompanied by his wife.  Has a chronic history anxiety.  Anxiety has been stable.  Taking Zoloft 150 mg daily.  Also taking alprazolam 0.5 mg as needed.  Medication also was effective.  Requesting refill today.  Did not follow-up with psychiatry.  They contacted him but did not recontact him for an appointment.

## 2019-12-23 NOTE — ASSESSMENT & PLAN NOTE
Chronic condition.  In the past was referred to sleep study but could not see them for a whole year.  Still has issues with fatigue as well as possible sleep apnea.

## 2019-12-23 NOTE — PROGRESS NOTES
Subjective:   Geremias Conte is a 29 y.o. male here today for anxiety, decreased hearing bilaterally, daytime hypersomnolence and inappropriate sinus tach.    OPAL (generalized anxiety disorder)  This is a 29-year-old male accompanied by his wife.  Has a chronic history anxiety.  Anxiety has been stable.  Taking Zoloft 150 mg daily.  Also taking alprazolam 0.5 mg as needed.  Medication also was effective.  Requesting refill today.  Did not follow-up with psychiatry.  They contacted him but did not recontact him for an appointment.    Decreased hearing of both ears  Complains of decreased hearing on both of his ears.  Symptoms usually in the morning.  Purchased over-the-counter earwax lavage kit with no relief.  Denies any ear pain.    Daytime hypersomnolence  Chronic condition.  In the past was referred to sleep study but could not see them for a whole year.  Still has issues with fatigue as well as possible sleep apnea.    Inappropriate sinus tachycardia  Chronic condition.  Currently is off the Lopressor.  Was taking 25 mg half a tablet twice daily.  Denies any recent issues with chest pain.  No irregular heart rates noted.       Current medicines (including changes today)  Current Outpatient Medications   Medication Sig Dispense Refill   • ALPRAZolam (XANAX) 0.5 MG Tab Take 1 Tab by mouth 1 time daily as needed for Anxiety for up to 30 days. 30 Tab 2   • sertraline (ZOLOFT) 100 MG Tab Take 1.5 Tabs by mouth every day. 135 Tab 0     No current facility-administered medications for this visit.      He  has a past medical history of Anxiety (8/8/2014) and Inappropriate sinus tachycardia (8/8/2014).    Social History and Family History were reviewed and updated.    ROS   No chest pain, no shortness of breath, no abdominal pain and all other systems were reviewed and are negative.       Objective:     /82 (BP Location: Left arm, Patient Position: Sitting, BP Cuff Size: Adult)   Pulse 80   Temp 37 °C (98.6 °F)  "(Temporal)   Resp 16   Ht 1.753 m (5' 9\")   Wt 91.6 kg (202 lb)   SpO2 95%  Body mass index is 29.83 kg/m².   Physical Exam:  Constitutional: Alert, no distress.  Skin: Warm, dry, good turgor, no rashes in visible areas.  Eye: Equal, round and reactive, conjunctiva clear, lids normal.  ENMT: Lips without lesions, good dentition, oropharynx clear.  Bilateral cerumen impaction noted and after lavage TMs are clear.  Neck: Trachea midline, no masses.   Lymph: No cervical or supraclavicular lymphadenopathy  Respiratory: Unlabored respiratory effort, lungs clear to auscultation, no wheezes, no ronchi.  Cardiovascular: Normal S1, S2, no murmur, no edema.  Psych: Alert and oriented x3, normal affect and mood.        Assessment and Plan:   The following treatment plan was discussed    1. OPAL (generalized anxiety disorder)  Chronic condition.  Stable.   reviewed.  Renewed Zoloft.  Renewed Xanax as needed.  Provided a 90-day supply.  Information given regarding psychiatry.  - ALPRAZolam (XANAX) 0.5 MG Tab; Take 1 Tab by mouth 1 time daily as needed for Anxiety for up to 30 days.  Dispense: 30 Tab; Refill: 2  - sertraline (ZOLOFT) 100 MG Tab; Take 1.5 Tabs by mouth every day.  Dispense: 135 Tab; Refill: 0    2. Inappropriate sinus tachycardia  Chronic condition.  Stable.  Patient discontinued metoprolol.  We will continue to monitor.    3. Daytime hypersomnolence  Chronic condition.  Referred again a sleep study.  - REFERRAL TO SLEEP STUDIES    4. Decreased hearing of both ears  Acute, new onset condition.  Symptoms secondary to cerumen impaction.  Resolved after lavage.      Followup: Return in about 3 months (around 3/23/2020), or if symptoms worsen or fail to improve.    Please note that this dictation was created using voice recognition software. I have made every reasonable attempt to correct obvious errors, but I expect that there are errors of grammar and possibly content that I did not discover before finalizing " the note.

## 2019-12-23 NOTE — ASSESSMENT & PLAN NOTE
Complains of decreased hearing on both of his ears.  Symptoms usually in the morning.  Purchased over-the-counter earwax lavage kit with no relief.  Denies any ear pain.

## 2020-03-16 ENCOUNTER — OFFICE VISIT (OUTPATIENT)
Dept: MEDICAL GROUP | Facility: MEDICAL CENTER | Age: 30
End: 2020-03-16
Payer: COMMERCIAL

## 2020-03-16 VITALS
HEART RATE: 72 BPM | OXYGEN SATURATION: 97 % | DIASTOLIC BLOOD PRESSURE: 88 MMHG | WEIGHT: 202 LBS | SYSTOLIC BLOOD PRESSURE: 140 MMHG | TEMPERATURE: 98.4 F | BODY MASS INDEX: 29.92 KG/M2 | RESPIRATION RATE: 16 BRPM | HEIGHT: 69 IN

## 2020-03-16 DIAGNOSIS — K58.0 IRRITABLE BOWEL SYNDROME WITH DIARRHEA: ICD-10-CM

## 2020-03-16 DIAGNOSIS — F41.1 GAD (GENERALIZED ANXIETY DISORDER): ICD-10-CM

## 2020-03-16 DIAGNOSIS — R06.83 SNORING: ICD-10-CM

## 2020-03-16 PROBLEM — G47.19 DAYTIME HYPERSOMNOLENCE: Status: RESOLVED | Noted: 2018-10-04 | Resolved: 2020-03-16

## 2020-03-16 PROCEDURE — 99214 OFFICE O/P EST MOD 30 MIN: CPT | Performed by: PHYSICIAN ASSISTANT

## 2020-03-16 RX ORDER — SERTRALINE HYDROCHLORIDE 100 MG/1
200 TABLET, FILM COATED ORAL DAILY
Qty: 180 TAB | Refills: 1 | Status: SHIPPED | OUTPATIENT
Start: 2020-03-16 | End: 2020-06-15 | Stop reason: SDUPTHER

## 2020-03-16 RX ORDER — SERTRALINE HYDROCHLORIDE 100 MG/1
200 TABLET, FILM COATED ORAL DAILY
Qty: 90 TAB | Refills: 1 | Status: SHIPPED | OUTPATIENT
Start: 2020-03-16 | End: 2020-03-16 | Stop reason: SDUPTHER

## 2020-03-16 RX ORDER — ALPRAZOLAM 0.5 MG/1
0.5 TABLET ORAL
Qty: 30 TAB | Refills: 2 | Status: SHIPPED | OUTPATIENT
Start: 2020-03-16 | End: 2020-06-15 | Stop reason: SDUPTHER

## 2020-03-16 ASSESSMENT — FIBROSIS 4 INDEX: FIB4 SCORE: 0.46

## 2020-03-16 ASSESSMENT — PATIENT HEALTH QUESTIONNAIRE - PHQ9: CLINICAL INTERPRETATION OF PHQ2 SCORE: 0

## 2020-03-16 NOTE — ASSESSMENT & PLAN NOTE
Also now states that he has had a chronic history of intermittent abdominal pain with diarrhea.  Typically he may wake up in the morning with generalized abdominal cramping and then will have a bowel movement with relief.  This been going on for many years.  Denies any nausea vomiting.  No rectal bleeding.  He is not sure if his symptoms are tied with his anxiety.

## 2020-03-16 NOTE — ASSESSMENT & PLAN NOTE
I have referred him for a sleep apnea study but it returned without sleep apnea.  He still snores.

## 2020-03-16 NOTE — PROGRESS NOTES
"Subjective:   Geremias Conte is a 29 y.o. male here today for anxiety, snoring and intermittent abdominal pain and diarrhea.    OPAL (generalized anxiety disorder)  This is a 29-year-old male accompanied by his wife.  Anxiety has been better.  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  Although it is improved without the panic attacks she still feels anxious daily.  I had increased his Zoloft on 50 mg.  Take Xanax maybe once a day.  Has yet to follow-up with psychiatry.  Consumes about 12 beers a week.    Snoring  I have referred him for a sleep apnea study but it returned without sleep apnea.  He still snores.    Irritable bowel syndrome with diarrhea  Also now states that he has had a chronic history of intermittent abdominal pain with diarrhea.  Typically he may wake up in the morning with generalized abdominal cramping and then will have a bowel movement with relief.  This been going on for many years.  Denies any nausea vomiting.  No rectal bleeding.  He is not sure if his symptoms are tied with his anxiety.       Current medicines (including changes today)  Current Outpatient Medications   Medication Sig Dispense Refill   • ALPRAZolam (XANAX) 0.5 MG Tab Take 1 Tab by mouth 1 time daily as needed for Anxiety for up to 30 days. 30 Tab 2   • sertraline (ZOLOFT) 100 MG Tab Take 2 Tabs by mouth every day. 180 Tab 1     No current facility-administered medications for this visit.      He  has a past medical history of Anxiety (8/8/2014) and Inappropriate sinus tachycardia (8/8/2014).    Social History and Family History were reviewed and updated.    ROS   No chest pain, no shortness of breath, no abdominal pain and all other systems were reviewed and are negative.       Objective:     /88   Pulse 72   Temp 36.9 °C (98.4 °F) (Temporal)   Resp 16   Ht 1.753 m (5' 9\")   Wt 91.6 kg (202 lb)   SpO2 97%  Body mass index is 29.83 kg/m².   Physical Exam:  Constitutional: Alert, no distress.  Skin: Warm, dry, " good turgor, no rashes in visible areas.  Eye: Equal, round and reactive, conjunctiva clear, lids normal.  ENMT: Lips without lesions, good dentition, oropharynx clear.  Neck: Trachea midline, no masses.   Lymph: No cervical or supraclavicular lymphadenopathy  Respiratory: Unlabored respiratory effort, lungs appear clear to auscultation, no wheezes.  Cardiovascular: Regular rate and rhythm.  Psych: Alert and oriented x3, normal affect and mood.        Assessment and Plan:   The following treatment plan was discussed    1. OPAL (generalized anxiety disorder)  Chronic condition.  Stable.   reviewed.  Medication appropriate.  Renewed Xanax 0.5 mg daily.  Provided a 90-day supply.  Will increase Zoloft to 200 mg daily.  Urged to follow-up with psychiatry.  - ALPRAZolam (XANAX) 0.5 MG Tab; Take 1 Tab by mouth 1 time daily as needed for Anxiety for up to 30 days.  Dispense: 30 Tab; Refill: 2  - sertraline (ZOLOFT) 100 MG Tab; Take 2 Tabs by mouth every day.  Dispense: 180 Tab; Refill: 1    2. Snoring  Chronic condition.  Refer to ENT for evaluation of snoring.  - REFERRAL TO ENT    3. Irritable bowel syndrome with diarrhea  New condition noted in chart but chronic.  Discussed IBS.  Advised to add a Metamucil product to bulk up his stool.  Will defer any referral to GI currently.  No red flags noted.      Followup: Return in about 3 months (around 6/16/2020), or if symptoms worsen or fail to improve.    Please note that this dictation was created using voice recognition software. I have made every reasonable attempt to correct obvious errors, but I expect that there are errors of grammar and possibly content that I did not discover before finalizing the note.

## 2020-03-16 NOTE — ASSESSMENT & PLAN NOTE
This is a 29-year-old male accompanied by his wife.  Anxiety has been better.  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  Although it is improved without the panic attacks she still feels anxious daily.  I had increased his Zoloft on 50 mg.  Take Xanax maybe once a day.  Has yet to follow-up with psychiatry.  Consumes about 12 beers a week.

## 2020-06-15 ENCOUNTER — OFFICE VISIT (OUTPATIENT)
Dept: MEDICAL GROUP | Facility: MEDICAL CENTER | Age: 30
End: 2020-06-15
Payer: COMMERCIAL

## 2020-06-15 VITALS
BODY MASS INDEX: 31.02 KG/M2 | RESPIRATION RATE: 16 BRPM | HEART RATE: 78 BPM | HEIGHT: 69 IN | SYSTOLIC BLOOD PRESSURE: 126 MMHG | OXYGEN SATURATION: 96 % | TEMPERATURE: 98.1 F | WEIGHT: 209.44 LBS | DIASTOLIC BLOOD PRESSURE: 68 MMHG

## 2020-06-15 DIAGNOSIS — Z00.00 PREVENTATIVE HEALTH CARE: ICD-10-CM

## 2020-06-15 DIAGNOSIS — F41.1 GENERALIZED ANXIETY DISORDER: ICD-10-CM

## 2020-06-15 DIAGNOSIS — E78.2 MIXED HYPERLIPIDEMIA: ICD-10-CM

## 2020-06-15 DIAGNOSIS — E66.9 OBESITY (BMI 30-39.9): ICD-10-CM

## 2020-06-15 PROCEDURE — 99214 OFFICE O/P EST MOD 30 MIN: CPT | Performed by: PHYSICIAN ASSISTANT

## 2020-06-15 RX ORDER — ALPRAZOLAM 0.5 MG/1
0.5 TABLET ORAL
Qty: 30 TAB | Refills: 2 | Status: SHIPPED | OUTPATIENT
Start: 2020-06-15 | End: 2020-06-29

## 2020-06-15 RX ORDER — ALPRAZOLAM 0.5 MG/1
TABLET ORAL
COMMUNITY
Start: 2020-05-26 | End: 2020-06-15

## 2020-06-15 RX ORDER — SERTRALINE HYDROCHLORIDE 100 MG/1
200 TABLET, FILM COATED ORAL DAILY
Qty: 180 TAB | Refills: 1 | Status: SHIPPED
Start: 2020-06-15 | End: 2020-09-11

## 2020-06-15 ASSESSMENT — FIBROSIS 4 INDEX: FIB4 SCORE: 0.46

## 2020-06-15 NOTE — PROGRESS NOTES
"Subjective:   Geremias Conte is a 29 y.o. male here today for anxiety, hyperlipidemia and preventative health care.    OPAL (generalized anxiety disorder)  This is a 29-year-old male accompanied by his wife, Nissa.  Chronic history of anxiety.  Anxiety has been stable.  Currently taking Zoloft 20 mg daily.  Also takes Xanax 0.5 mg daily.  Medication has been effective.  He states that he still will take it daily because now at work he is required to wear a mask all day.  Medication does help him relax and wear the mask.  He only takes it once a day.  Requesting refill.    Mixed hyperlipidemia  In the past his cholesterol was elevated.  No recent labs have been done.       Current medicines (including changes today)  Current Outpatient Medications   Medication Sig Dispense Refill   • sertraline (ZOLOFT) 100 MG Tab Take 2 Tabs by mouth every day. 180 Tab 1   • ALPRAZolam (XANAX) 0.5 MG Tab Take 1 Tab by mouth 1 time daily as needed for Anxiety for up to 30 days. 30 Tab 2     No current facility-administered medications for this visit.      He  has a past medical history of Anxiety (8/8/2014) and Inappropriate sinus tachycardia (8/8/2014).    Social History and Family History were reviewed and updated.    ROS   No chest pain, no shortness of breath, no abdominal pain and all other systems were reviewed and are negative.       Objective:     /68   Pulse 78   Temp 36.7 °C (98.1 °F) (Temporal)   Resp 16   Ht 1.753 m (5' 9\")   Wt 95 kg (209 lb 7 oz)   SpO2 96%  Body mass index is 30.93 kg/m².   Physical Exam:  Constitutional: Alert, no distress.  Skin: Warm, dry, good turgor, no rashes in visible areas.  Eye: Equal, round and reactive, conjunctiva clear, lids normal.  ENMT: Lips without lesions, good dentition, oropharynx clear.  Neck: Trachea midline, no masses.   Respiratory: Unlabored respiratory effort, lungs appear clear, no wheezes.  Cardiovascular: Regular rate and rhythm.  Psych: Alert and oriented x3, " normal affect and mood.        Assessment and Plan:   The following treatment plan was discussed    1. OPAL (generalized anxiety disorder)  Chronic condition.  Stable.  Renewed Zoloft 200 mg daily.  Also provided a refill of Xanax.  Sent to the pharmacy electronically.  Sent over 90-day supply.  Appointment made in 3 months.  - sertraline (ZOLOFT) 100 MG Tab; Take 2 Tabs by mouth every day.  Dispense: 180 Tab; Refill: 1  - ALPRAZolam (XANAX) 0.5 MG Tab; Take 1 Tab by mouth 1 time daily as needed for Anxiety for up to 30 days.  Dispense: 30 Tab; Refill: 2  - Patient identified as having weight management issue.  Appropriate orders and counseling given.    2. Mixed hyperlipidemia  Chronic condition.  Status unknown.  Discussed making dietary changes we will continue to monitor.  Likely he may need medication.    3. Obesity (BMI 30-39.9)  New condition noted.  Advised to eat healthier and exercise routinely.  Hopefully back at work he will lose weight.    4. Preventative health care  Order labs fasting.  He will be contacted with the results.  - Comp Metabolic Panel; Future  - Lipid Profile; Future  - HEMOGLOBIN A1C; Future      Followup: Return in about 3 months (around 9/15/2020), or if symptoms worsen or fail to improve.    Please note that this dictation was created using voice recognition software. I have made every reasonable attempt to correct obvious errors, but I expect that there are errors of grammar and possibly content that I did not discover before finalizing the note.

## 2020-06-15 NOTE — ASSESSMENT & PLAN NOTE
This is a 29-year-old male accompanied by his wife, Nissa.  Chronic history of anxiety.  Anxiety has been stable.  Currently taking Zoloft 20 mg daily.  Also takes Xanax 0.5 mg daily.  Medication has been effective.  He states that he still will take it daily because now at work he is required to wear a mask all day.  Medication does help him relax and wear the mask.  He only takes it once a day.  Requesting refill.

## 2020-09-08 ENCOUNTER — APPOINTMENT (OUTPATIENT)
Dept: MEDICAL GROUP | Facility: MEDICAL CENTER | Age: 30
End: 2020-09-08
Payer: COMMERCIAL

## 2020-09-11 ENCOUNTER — HOSPITAL ENCOUNTER (OUTPATIENT)
Dept: RADIOLOGY | Facility: MEDICAL CENTER | Age: 30
End: 2020-09-11
Attending: NURSE PRACTITIONER
Payer: COMMERCIAL

## 2020-09-11 ENCOUNTER — HOSPITAL ENCOUNTER (OUTPATIENT)
Dept: LAB | Facility: MEDICAL CENTER | Age: 30
End: 2020-09-11
Attending: PHYSICIAN ASSISTANT
Payer: COMMERCIAL

## 2020-09-11 ENCOUNTER — HOSPITAL ENCOUNTER (OUTPATIENT)
Facility: MEDICAL CENTER | Age: 30
End: 2020-09-11
Attending: NURSE PRACTITIONER
Payer: COMMERCIAL

## 2020-09-11 ENCOUNTER — OFFICE VISIT (OUTPATIENT)
Dept: URGENT CARE | Facility: PHYSICIAN GROUP | Age: 30
End: 2020-09-11
Payer: COMMERCIAL

## 2020-09-11 ENCOUNTER — HOSPITAL ENCOUNTER (EMERGENCY)
Facility: MEDICAL CENTER | Age: 30
End: 2020-09-11
Payer: COMMERCIAL

## 2020-09-11 VITALS
DIASTOLIC BLOOD PRESSURE: 109 MMHG | HEIGHT: 69 IN | TEMPERATURE: 98.6 F | HEART RATE: 89 BPM | BODY MASS INDEX: 30.27 KG/M2 | OXYGEN SATURATION: 98 % | SYSTOLIC BLOOD PRESSURE: 159 MMHG | WEIGHT: 204.37 LBS | RESPIRATION RATE: 18 BRPM

## 2020-09-11 VITALS
TEMPERATURE: 97.7 F | HEART RATE: 75 BPM | DIASTOLIC BLOOD PRESSURE: 104 MMHG | SYSTOLIC BLOOD PRESSURE: 158 MMHG | BODY MASS INDEX: 29.33 KG/M2 | OXYGEN SATURATION: 99 % | WEIGHT: 198 LBS | RESPIRATION RATE: 17 BRPM | HEIGHT: 69 IN

## 2020-09-11 DIAGNOSIS — Z20.822 EXPOSURE TO COVID-19 VIRUS: ICD-10-CM

## 2020-09-11 DIAGNOSIS — R06.02 SOB (SHORTNESS OF BREATH): ICD-10-CM

## 2020-09-11 DIAGNOSIS — R07.9 CHEST PAIN, UNSPECIFIED TYPE: ICD-10-CM

## 2020-09-11 DIAGNOSIS — F41.1 GENERALIZED ANXIETY DISORDER: ICD-10-CM

## 2020-09-11 DIAGNOSIS — R03.0 ELEVATED BLOOD-PRESSURE READING, WITHOUT DIAGNOSIS OF HYPERTENSION: ICD-10-CM

## 2020-09-11 DIAGNOSIS — Z00.00 PREVENTATIVE HEALTH CARE: ICD-10-CM

## 2020-09-11 DIAGNOSIS — Z20.822 EXPOSURE TO COVID-19 VIRUS: Primary | ICD-10-CM

## 2020-09-11 LAB
ALBUMIN SERPL BCP-MCNC: 4.9 G/DL (ref 3.2–4.9)
ALBUMIN/GLOB SERPL: 1.7 G/DL
ALP SERPL-CCNC: 89 U/L (ref 30–99)
ALT SERPL-CCNC: 69 U/L (ref 2–50)
ANION GAP SERPL CALC-SCNC: 12 MMOL/L (ref 7–16)
AST SERPL-CCNC: 36 U/L (ref 12–45)
BILIRUB SERPL-MCNC: 0.6 MG/DL (ref 0.1–1.5)
BUN SERPL-MCNC: 15 MG/DL (ref 8–22)
CALCIUM SERPL-MCNC: 9.5 MG/DL (ref 8.5–10.5)
CHLORIDE SERPL-SCNC: 99 MMOL/L (ref 96–112)
CHOLEST SERPL-MCNC: 246 MG/DL (ref 100–199)
CO2 SERPL-SCNC: 25 MMOL/L (ref 20–33)
COVID ORDER STATUS COVID19: NORMAL
CREAT SERPL-MCNC: 0.84 MG/DL (ref 0.5–1.4)
EKG IMPRESSION: NORMAL
EST. AVERAGE GLUCOSE BLD GHB EST-MCNC: 100 MG/DL
FASTING STATUS PATIENT QL REPORTED: NORMAL
GLOBULIN SER CALC-MCNC: 2.9 G/DL (ref 1.9–3.5)
GLUCOSE SERPL-MCNC: 92 MG/DL (ref 65–99)
HBA1C MFR BLD: 5.1 % (ref 0–5.6)
HDLC SERPL-MCNC: 40 MG/DL
LDLC SERPL CALC-MCNC: 137 MG/DL
POTASSIUM SERPL-SCNC: 4.2 MMOL/L (ref 3.6–5.5)
PROT SERPL-MCNC: 7.8 G/DL (ref 6–8.2)
SARS-COV-2 RNA RESP QL NAA+PROBE: NOTDETECTED
SODIUM SERPL-SCNC: 136 MMOL/L (ref 135–145)
SPECIMEN SOURCE: NORMAL
TRIGL SERPL-MCNC: 345 MG/DL (ref 0–149)

## 2020-09-11 PROCEDURE — 99214 OFFICE O/P EST MOD 30 MIN: CPT | Performed by: NURSE PRACTITIONER

## 2020-09-11 PROCEDURE — 302449 STATCHG TRIAGE ONLY (STATISTIC)

## 2020-09-11 PROCEDURE — 36415 COLL VENOUS BLD VENIPUNCTURE: CPT

## 2020-09-11 PROCEDURE — U0003 INFECTIOUS AGENT DETECTION BY NUCLEIC ACID (DNA OR RNA); SEVERE ACUTE RESPIRATORY SYNDROME CORONAVIRUS 2 (SARS-COV-2) (CORONAVIRUS DISEASE [COVID-19]), AMPLIFIED PROBE TECHNIQUE, MAKING USE OF HIGH THROUGHPUT TECHNOLOGIES AS DESCRIBED BY CMS-2020-01-R: HCPCS

## 2020-09-11 PROCEDURE — 71046 X-RAY EXAM CHEST 2 VIEWS: CPT

## 2020-09-11 PROCEDURE — 80053 COMPREHEN METABOLIC PANEL: CPT

## 2020-09-11 PROCEDURE — 93000 ELECTROCARDIOGRAM COMPLETE: CPT | Performed by: NURSE PRACTITIONER

## 2020-09-11 PROCEDURE — 93005 ELECTROCARDIOGRAM TRACING: CPT

## 2020-09-11 PROCEDURE — 80061 LIPID PANEL: CPT

## 2020-09-11 PROCEDURE — 83036 HEMOGLOBIN GLYCOSYLATED A1C: CPT

## 2020-09-11 RX ORDER — ALPRAZOLAM 0.5 MG/1
TABLET ORAL
COMMUNITY
Start: 2020-06-23 | End: 2020-09-14

## 2020-09-11 RX ORDER — SERTRALINE HYDROCHLORIDE 100 MG/1
200 TABLET, FILM COATED ORAL DAILY
Qty: 180 TAB | Refills: 1 | Status: SHIPPED
Start: 2020-09-11 | End: 2021-04-12

## 2020-09-11 ASSESSMENT — ENCOUNTER SYMPTOMS
PALPITATIONS: 0
DIARRHEA: 0
NAUSEA: 0
HEADACHES: 0
BACK PAIN: 0
MEMORY LOSS: 0
CHILLS: 0
HEARTBURN: 0
DIZZINESS: 0
CONSTIPATION: 0
VOMITING: 0
FEVER: 0
SORE THROAT: 0
WHEEZING: 0
COUGH: 0
SHORTNESS OF BREATH: 1
HEMOPTYSIS: 0
ORTHOPNEA: 0
DEPRESSION: 0
TINGLING: 0
MYALGIAS: 0
NERVOUS/ANXIOUS: 1

## 2020-09-11 ASSESSMENT — FIBROSIS 4 INDEX
FIB4 SCORE: 0.46
FIB4 SCORE: 0.44

## 2020-09-11 ASSESSMENT — PAIN SCALES - WONG BAKER: WONGBAKER_NUMERICALRESPONSE: HURTS EVEN MORE

## 2020-09-11 ASSESSMENT — LIFESTYLE VARIABLES: SUBSTANCE_ABUSE: 0

## 2020-09-11 ASSESSMENT — PAIN SCALES - GENERAL: PAINLEVEL: 3=SLIGHT PAIN

## 2020-09-11 NOTE — PROGRESS NOTES
"Subjective:      Geremias Conte is a 29 y.o. male who presents with Chest Pain (x 3 days, SOB)          Patient presents to urgent care with complaint of 3 days of chest discomfort and shortness of breath.  Patient reports that he has right sided chest pain that hurts when he palpates his chest.  He has history of similar symptoms in the past which have been related to his anxiety however they usually stop within 1 day or so.  He denies any history of prolonged bedrest recent, recent surgery, long road trips.  Patient does have generalized anxiety disorder.  Patient reports he has been off of his Zoloft for approximately 1 month and stopped suddenly when he ran out of medication.  He did not go back to his PCP for refill.  Patient has no known exposure to COVID-19 however he does work at [employer].  He denies any cough, fever, chills, nausea, vomiting, diaphoresis, lower extremity edema, palpitations.  He has not tried anything for this.      Review of Systems   Constitutional: Negative for chills, fever and malaise/fatigue.   HENT: Negative for ear pain and sore throat.    Respiratory: Positive for shortness of breath. Negative for cough, hemoptysis and wheezing.    Cardiovascular: Positive for chest pain. Negative for palpitations, orthopnea and leg swelling.   Gastrointestinal: Negative for constipation, diarrhea, heartburn, nausea and vomiting.   Musculoskeletal: Negative for back pain, joint pain and myalgias.   Skin: Negative for rash.   Neurological: Negative for dizziness, tingling and headaches.   Psychiatric/Behavioral: Negative for depression, memory loss, substance abuse and suicidal ideas. The patient is nervous/anxious.    All other systems reviewed and are negative.         Objective:     /104   Pulse 75   Temp 36.5 °C (97.7 °F)   Resp 17   Ht 1.753 m (5' 9\")   Wt 89.8 kg (198 lb)   SpO2 99%   BMI 29.24 kg/m²      Physical Exam  Vitals signs reviewed.   Constitutional:       General: He is not " in acute distress.     Appearance: Normal appearance. He is not ill-appearing or diaphoretic.   HENT:      Head: Normocephalic.      Right Ear: External ear normal.      Left Ear: External ear normal.      Nose: Nose normal. No congestion.      Mouth/Throat:      Mouth: Mucous membranes are moist.   Eyes:      Extraocular Movements: Extraocular movements intact.      Conjunctiva/sclera: Conjunctivae normal.   Neck:      Musculoskeletal: Normal range of motion and neck supple.      Vascular: No carotid bruit.   Cardiovascular:      Rate and Rhythm: Normal rate and regular rhythm.      Pulses: Normal pulses.      Heart sounds: Normal heart sounds. No murmur.   Pulmonary:      Effort: Pulmonary effort is normal. No respiratory distress.      Breath sounds: Normal breath sounds. No wheezing, rhonchi or rales.   Chest:      Chest wall: Tenderness present.   Abdominal:      General: Abdomen is flat. Bowel sounds are normal.      Palpations: Abdomen is soft.      Tenderness: There is no abdominal tenderness.   Musculoskeletal: Normal range of motion.   Lymphadenopathy:      Cervical: No cervical adenopathy.   Skin:     General: Skin is warm and dry.      Capillary Refill: Capillary refill takes less than 2 seconds.   Neurological:      Mental Status: He is alert and oriented to person, place, and time.   Psychiatric:         Mood and Affect: Mood normal.         Behavior: Behavior normal.                 Reading Physician Reading Date Result Priority   Kristin England M.D.  [phone] 9/11/2020 [facility]      Narrative & Impression        9/11/2020 9:07 AM     HISTORY/REASON FOR EXAM:  Shortness of Breath.  Chest pain for 3 days     TECHNIQUE/EXAM DESCRIPTION AND NUMBER OF VIEWS:  Two views of the chest.     COMPARISON:  6/25/2019     FINDINGS:  The heart is normal in size.  No pulmonary infiltrates or consolidations are noted.  No pleural effusions are appreciated.        IMPRESSION:     No active disease       EKG  Interpretation-HR is 75 normal EKG, normal sinus rhythm, unchanged from previous tracings       Assessment/Plan:        1. Exposure to COVID-19 virus  COVID/SARS COV-2 PCR   2. Chest pain, unspecified type  EKG    DX-CHEST-2 VIEWS    COVID/SARS COV-2 PCR   3. SOB (shortness of breath)  EKG    DX-CHEST-2 VIEWS    COVID/SARS COV-2 PCR   4. OPAL (generalized anxiety disorder)  sertraline (ZOLOFT) 100 MG Tab   5. Elevated blood-pressure reading, without diagnosis of hypertension       Discussed with patient that Cardiac workup in  is limited and patient verbalized understanding. He declines to be evaluated further in ER but promises to go if symptoms worsen.  He requests refill of his anxiety medication. We will also test for Covid 19 as patient cannot go back to work without it due to SOB.      Vitals are stable at this time.  Patient is advised to self isolate and provided with self isolation precautions AVS information.  Discussed when to return to urgent care or ER including for worsening shortness of breath.  Patient verbalized understanding and has no additional questions.    This patient was cared for during the COVID-19 pandemic. History and physical exam may be limited/truncated by the inherent challenges of PPE and the need to decrease staff exposure to novel coronavirus. Some aspects of disease management may be different to protect staff and help slow the spread of disease. I verified that, if possible, the patient was wearing a mask and I was wearing appropriate PPE every time I encountered the patient.     Plan of care, medications and treatments reviewed with patient and or guardian.  Patient and or guardian voices understanding and agrees with the instructions provided. After visit summary reviewed with patient. Patient and or guardian understand the parameters for reevaluation and ER precautions discussed.     Follow up with primary care provider in the next 1-5 days for recheck as needed.  Discussed  that urgent care setting has limited resources, therefore any worsening of symptoms should be evaluated in the ER. Patient and or guardian verbalized understanding.     Please note that this dictation was created using voice recognition software. I have made every reasonable attempt to correct obvious errors, but I expect that there are errors of grammar and possibly content that I did not discover before finalizing the note.

## 2020-09-12 NOTE — ED TRIAGE NOTES
Chief Complaint   Patient presents with   • Chest Pressure     intermittent right lower chest pressure x 3 days, worse today. Pain increases with inspiration. + SOB, + n/v x2     EKG completed prior to triage. Pt ambulatory to triage with above complaints. Seen at urgent care for same this am. Pt endorses brother was positive for covid 3 months ago, denies known exposure recently. Mask in place. Educated on triage process, encouraged to inform staff of any changes.

## 2020-09-12 NOTE — ED NOTES
"Pt decided to leave \"due to long wait times.\" Walked out with steady gait. Dismissing from system now.  "

## 2020-09-13 ENCOUNTER — TELEPHONE (OUTPATIENT)
Dept: URGENT CARE | Facility: PHYSICIAN GROUP | Age: 30
End: 2020-09-13

## 2020-09-13 NOTE — TELEPHONE ENCOUNTER
----- Message from NIDIA Rios sent at 9/13/2020 11:27 AM PDT -----  Please notify patient of negative COVID-19 results.    Thank you,    Yanely Velazquez, APRN

## 2020-09-14 ENCOUNTER — OFFICE VISIT (OUTPATIENT)
Dept: MEDICAL GROUP | Facility: MEDICAL CENTER | Age: 30
End: 2020-09-14
Payer: COMMERCIAL

## 2020-09-14 VITALS
RESPIRATION RATE: 16 BRPM | HEIGHT: 69 IN | OXYGEN SATURATION: 96 % | SYSTOLIC BLOOD PRESSURE: 136 MMHG | DIASTOLIC BLOOD PRESSURE: 82 MMHG | HEART RATE: 88 BPM | TEMPERATURE: 97.8 F | WEIGHT: 202 LBS | BODY MASS INDEX: 29.92 KG/M2

## 2020-09-14 DIAGNOSIS — F41.1 GENERALIZED ANXIETY DISORDER: ICD-10-CM

## 2020-09-14 DIAGNOSIS — E78.2 MIXED HYPERLIPIDEMIA: ICD-10-CM

## 2020-09-14 DIAGNOSIS — R03.0 ELEVATED BLOOD PRESSURE READING: ICD-10-CM

## 2020-09-14 PROCEDURE — 99214 OFFICE O/P EST MOD 30 MIN: CPT | Performed by: PHYSICIAN ASSISTANT

## 2020-09-14 RX ORDER — ALPRAZOLAM 0.5 MG/1
0.5 TABLET ORAL
Qty: 30 TAB | Refills: 2 | Status: SHIPPED | OUTPATIENT
Start: 2020-09-14 | End: 2020-12-08 | Stop reason: SDUPTHER

## 2020-09-14 ASSESSMENT — FIBROSIS 4 INDEX: FIB4 SCORE: 0.44

## 2020-09-14 NOTE — PROGRESS NOTES
"Subjective:   Geremias Conte is a 29 y.o. male here today for mixed hyperlipidemia, anxiety and elevated BP.    Mixed hyperlipidemia  This is a 29-year-old male accompanied by his wife, Nissa.  Recent cholesterol profile showed an improvement in his cholesterol.  Triglycerides still elevated but slightly lower at 345.  Total cholesterol slightly below 250 and LDL in the 130s.  In the past it was higher.  No family history of any known CAD.    OPAL (generalized anxiety disorder)  Chronic condition.  Stable.  Takes Zoloft 200 mg daily.  Requesting refill of Xanax.  Takes 0.5 mg daily.  Medications are effective.    Elevated blood pressure reading  States that his blood pressure has been elevated as of late.  He will check it when he is not feeling well.  Recently was having some right-sided chest pain.  It has resolved.  Took some ibuprofen which improved his symptoms.  Went to the urgent care.  Nothing found.  Denies any heartburn or reflux.         Current medicines (including changes today)  Current Outpatient Medications   Medication Sig Dispense Refill   • ALPRAZolam (XANAX) 0.5 MG Tab Take 1 Tab by mouth 1 time daily as needed for Anxiety for up to 30 days. 30 Tab 2   • sertraline (ZOLOFT) 100 MG Tab Take 2 Tabs by mouth every day. 180 Tab 1     No current facility-administered medications for this visit.      He  has a past medical history of Anxiety (8/8/2014) and Inappropriate sinus tachycardia (8/8/2014).    Social History and Family History were reviewed and updated.    ROS   No chest pain, no shortness of breath, no abdominal pain and all other systems were reviewed and are negative.       Objective:     /82   Pulse 88   Temp 36.6 °C (97.8 °F) (Temporal)   Resp 16   Ht 1.753 m (5' 9\")   Wt 91.6 kg (202 lb)   SpO2 96%  Body mass index is 29.83 kg/m².   Physical Exam:  Constitutional: Alert, no distress.  Skin: Warm, dry, good turgor, no rashes in visible areas.  Eye: Equal, round and reactive, " conjunctiva clear, lids normal.  ENMT: Lips without lesions, good dentition, oropharynx clear.  Neck: Trachea midline, no masses.   Lymph: No cervical or supraclavicular lymphadenopathy  Respiratory: Unlabored respiratory effort, lungs appear clear, no wheezes.  Cardiovascular: Regular rate and rhythm.  Psych: Alert and oriented x3, normal affect and mood.        Assessment and Plan:   The following treatment plan was discussed    1. Mixed hyperlipidemia  Chronic condition.  Improved but not ideal.  Continue lifestyle modifications.  We will give him 1 more shot at lowering his cholesterol if not lower than will start medication.    2. OPAL (generalized anxiety disorder)  Chronic condition.  Stable.  Renewed Xanax.  Electronically sent over a 90-day supply.  Continue Zoloft.  Advised not to run out of medication.  - ALPRAZolam (XANAX) 0.5 MG Tab; Take 1 Tab by mouth 1 time daily as needed for Anxiety for up to 30 days.  Dispense: 30 Tab; Refill: 2    3. Elevated blood pressure reading  Elevated today.  Advised to check his BP in the morning prior to getting out of bed.  If blood pressure remains elevated I will start him on antihypertensive medication.      Followup: Return in about 3 months (around 12/14/2020), or if symptoms worsen or fail to improve.    Please note that this dictation was created using voice recognition software. I have made every reasonable attempt to correct obvious errors, but I expect that there are errors of grammar and possibly content that I did not discover before finalizing the note.

## 2020-09-14 NOTE — ASSESSMENT & PLAN NOTE
Chronic condition.  Stable.  Takes Zoloft 200 mg daily.  Requesting refill of Xanax.  Takes 0.5 mg daily.  Medications are effective.

## 2020-09-14 NOTE — ASSESSMENT & PLAN NOTE
States that his blood pressure has been elevated as of late.  He will check it when he is not feeling well.  Recently was having some right-sided chest pain.  It has resolved.  Took some ibuprofen which improved his symptoms.  Went to the urgent care.  Nothing found.  Denies any heartburn or reflux.

## 2020-09-14 NOTE — ASSESSMENT & PLAN NOTE
This is a 29-year-old male accompanied by his wife, Nissa.  Recent cholesterol profile showed an improvement in his cholesterol.  Triglycerides still elevated but slightly lower at 345.  Total cholesterol slightly below 250 and LDL in the 130s.  In the past it was higher.  No family history of any known CAD.

## 2020-11-23 ENCOUNTER — OFFICE VISIT (OUTPATIENT)
Dept: URGENT CARE | Facility: PHYSICIAN GROUP | Age: 30
End: 2020-11-23
Payer: COMMERCIAL

## 2020-11-23 ENCOUNTER — HOSPITAL ENCOUNTER (OUTPATIENT)
Facility: MEDICAL CENTER | Age: 30
End: 2020-11-23
Attending: PHYSICIAN ASSISTANT
Payer: COMMERCIAL

## 2020-11-23 VITALS
HEART RATE: 83 BPM | HEIGHT: 69 IN | OXYGEN SATURATION: 97 % | RESPIRATION RATE: 16 BRPM | WEIGHT: 202 LBS | DIASTOLIC BLOOD PRESSURE: 90 MMHG | TEMPERATURE: 98.9 F | BODY MASS INDEX: 29.92 KG/M2 | SYSTOLIC BLOOD PRESSURE: 120 MMHG

## 2020-11-23 DIAGNOSIS — Z20.822 SUSPECTED COVID-19 VIRUS INFECTION: ICD-10-CM

## 2020-11-23 DIAGNOSIS — R11.2 NAUSEA AND VOMITING, INTRACTABILITY OF VOMITING NOT SPECIFIED, UNSPECIFIED VOMITING TYPE: ICD-10-CM

## 2020-11-23 DIAGNOSIS — R43.2 TASTE ABSENT: ICD-10-CM

## 2020-11-23 DIAGNOSIS — R05.9 COUGH: ICD-10-CM

## 2020-11-23 PROCEDURE — 99214 OFFICE O/P EST MOD 30 MIN: CPT | Mod: CS | Performed by: PHYSICIAN ASSISTANT

## 2020-11-23 PROCEDURE — U0003 INFECTIOUS AGENT DETECTION BY NUCLEIC ACID (DNA OR RNA); SEVERE ACUTE RESPIRATORY SYNDROME CORONAVIRUS 2 (SARS-COV-2) (CORONAVIRUS DISEASE [COVID-19]), AMPLIFIED PROBE TECHNIQUE, MAKING USE OF HIGH THROUGHPUT TECHNOLOGIES AS DESCRIBED BY CMS-2020-01-R: HCPCS

## 2020-11-23 RX ORDER — BENZONATATE 100 MG/1
200 CAPSULE ORAL 3 TIMES DAILY PRN
Qty: 60 CAP | Refills: 0 | Status: SHIPPED
Start: 2020-11-23 | End: 2020-12-08

## 2020-11-23 RX ORDER — ONDANSETRON 4 MG/1
4 TABLET, ORALLY DISINTEGRATING ORAL EVERY 6 HOURS PRN
Qty: 10 TAB | Refills: 0 | Status: SHIPPED | OUTPATIENT
Start: 2020-11-23 | End: 2020-11-26

## 2020-11-23 RX ORDER — ALPRAZOLAM 0.5 MG/1
TABLET ORAL
COMMUNITY
Start: 2020-10-26 | End: 2020-12-08

## 2020-11-23 ASSESSMENT — ENCOUNTER SYMPTOMS
CHILLS: 1
MYALGIAS: 1
NAUSEA: 1
COUGH: 1
DIARRHEA: 0
VOMITING: 1
SPUTUM PRODUCTION: 0
FEVER: 0

## 2020-11-23 ASSESSMENT — FIBROSIS 4 INDEX: FIB4 SCORE: 0.44

## 2020-11-23 NOTE — PROGRESS NOTES
"Subjective:   Geremias Conte  is a 29 y.o. male who presents for Cough (Pt reports loss of taste, sob, Dry cough. Onset 3 days. )    This is a new problem.  Patient presents urgent care with 3-day history of fatigue, cough, slight shortness of breath, loss of taste and loss of smell.  Patient reports generalized body aches and chills.  He has had no documented fever.  Cough is nonproductive.  Patient has also been experience some nausea and has of had a few episodes of vomiting.  He denies any diarrhea.  Patient has had no known exposure to COVID-19.      Cough  Associated symptoms include chills and myalgias. Pertinent negatives include no fever.     Review of Systems   Constitutional: Positive for chills and malaise/fatigue. Negative for fever.   Respiratory: Positive for cough. Negative for sputum production.    Gastrointestinal: Positive for nausea and vomiting. Negative for diarrhea.   Musculoskeletal: Positive for myalgias.   All other systems reviewed and are negative.    Allergies   Allergen Reactions   • Lexapro Unspecified     Made patient more anxious     Reviewed past medical, surgical , social and family history.  Reviewed prescription and over-the-counter medications with patient and electronic health record today.     Objective:   /90 (BP Location: Left arm, Patient Position: Sitting, BP Cuff Size: Large adult)   Pulse 83   Temp 37.2 °C (98.9 °F) (Temporal)   Resp 16   Ht 1.753 m (5' 9\")   Wt 91.6 kg (202 lb)   SpO2 97%   BMI 29.83 kg/m²   Physical Exam  Vitals signs reviewed.   Constitutional:       Appearance: He is well-developed. He is not ill-appearing or toxic-appearing.   HENT:      Head: Normocephalic and atraumatic.      Right Ear: Tympanic membrane, ear canal and external ear normal.      Left Ear: Tympanic membrane, ear canal and external ear normal.      Nose: Nose normal.      Mouth/Throat:      Lips: Pink.      Mouth: Mucous membranes are moist.      Pharynx: Uvula midline. No " oropharyngeal exudate.   Eyes:      Conjunctiva/sclera: Conjunctivae normal.      Pupils: Pupils are equal, round, and reactive to light.   Neck:      Musculoskeletal: Normal range of motion and neck supple.   Cardiovascular:      Rate and Rhythm: Normal rate and regular rhythm.      Heart sounds: Normal heart sounds. No murmur. No friction rub.   Pulmonary:      Effort: Pulmonary effort is normal. No respiratory distress.      Breath sounds: Normal breath sounds.   Abdominal:      General: Bowel sounds are normal.      Palpations: Abdomen is soft.      Tenderness: There is no abdominal tenderness.   Musculoskeletal: Normal range of motion.   Lymphadenopathy:      Head:      Right side of head: No submental, submandibular or tonsillar adenopathy.      Left side of head: No submental, submandibular or tonsillar adenopathy.      Cervical: No cervical adenopathy.      Upper Body:      Right upper body: No supraclavicular adenopathy.      Left upper body: No supraclavicular adenopathy.   Skin:     General: Skin is warm and dry.      Findings: No rash.   Neurological:      Mental Status: He is alert and oriented to person, place, and time.      Cranial Nerves: Cranial nerves are intact. No cranial nerve deficit.      Sensory: Sensation is intact. No sensory deficit.      Motor: Motor function is intact.      Coordination: Coordination is intact. Coordination normal.      Gait: Gait is intact.   Psychiatric:         Attention and Perception: Attention normal.         Mood and Affect: Mood normal.         Speech: Speech normal.         Behavior: Behavior normal.         Thought Content: Thought content normal.         Judgment: Judgment normal.           Assessment/Plan:   1. Cough  - COVID/SARS COV-2 PCR; Future  - benzonatate (TESSALON) 100 MG Cap; Take 2 Caps by mouth 3 times a day as needed.  Dispense: 60 Cap; Refill: 0    2. Taste absent  - COVID/SARS COV-2 PCR; Future    3. Suspected COVID-19 virus infection  -  COVID/SARS COV-2 PCR; Future    4. Nausea and vomiting, intractability of vomiting not specified, unspecified vomiting type  - ondansetron (ZOFRAN ODT) 4 MG TABLET DISPERSIBLE; Take 1 Tab by mouth every 6 hours as needed for Nausea for up to 3 days.  Dispense: 10 Tab; Refill: 0    Testing performed for COVID-19.  Patient/guardian is given printed instructions regarding self-isolation.  Work/school note is provided with specific return to work/school protocols.  Reviewed with patient/guardian that if they do test positive they will be contacted by their local health department regarding return to work/school protocols.  Results will also be released to patient/guardian in [facility] and call to the patient/guardian directly.  Encouraged mask use, frequent handwashing, wiping down hard surfaces, etc.    Nasal saline rinse  Over-the-counter Flonase nasal spray per 's instructions  Mucinex as needed    May use Tylenol or ibuprofen over-the-counter as needed for pain or fever not to exceed recommended daily dose.    Patient is given Tessalon Perles as needed for cough and Zofran as needed for nausea and vomiting.    Upon entering exam room I ensured patient was wearing a mask.  This provider wore appropriate PPE throughout entire visit.  Patient wore mask entire visit except for a brief period while examining oropharynx.    Differential diagnosis, natural history, supportive care, and indications for immediate follow-up discussed.     Red flag warning symptoms and strict ER/follow-up precautions given.  The patient demonstrated a good understanding and agreed with the treatment plan.  Please note that this note was created using voice recognition speech to text software. Every effort has been made to correct obvious errors.  However, I expect there are errors of grammar and possibly context that were not discovered prior to finalizing the note  BHAVIK Garvin PA-C

## 2020-11-23 NOTE — LETTER
November 23, 2020         Patient: Geremias Conte   YOB: 1990   Date of Visit: 11/23/2020    Your employee was seen in our clinic today.  A concern for COVID-19 has been identified and testing is in progress. We are asking you to excuse absences while following self-isolation protocol per Center for Disease Control (CDC) guidelines.  Your employee will be able to access test results through our electronic delivery system called [employer].     If the results of testing are positive, your employee should follow the CDC guidelines.  These are isolation for a minimum of 10 days and at least 24 hours have passed since your last fever without the use of fever-reducing medications and all other symptoms have improved.  The health department may contact you and provide further directions regarding self-isolation and return to work.     Negative result without close contact*:  If your employee was tested due to their symptoms without close contact to someone with COVID-19 and their test is negative: your employee should not return to work or regular activities until 24 hours after symptoms fully improve. (For example, if patient feels back to normal on Tuesday, should remain isolated through Wednesday).      Negative with close contact*:  If your employee was tested due to close contact to someone, they should self isolate for 14 days after their exposure.    Negative with positive household member  If your employee was due to a positive household member they should still quarantine for a period of 14 days.  The 14 day period begins once the household member is isolated. If unable to quarantine (for example mom from infant), the CDC advises an additional 14-day quarantine period from the COVID-19 household member.   In general, repeat testing is not necessary and not offered through our [facility].     This is the only note that will be provided from [facility] for this visit.  Your employee will  require an appointment with a primary care provider if FMLA or disability forms are required.  If you have any questions please do not hesitate to call me at the phone number listed below.    Sincerely,    ISHMAEL Garza.-C.  [phone]

## 2020-11-24 DIAGNOSIS — Z20.822 SUSPECTED COVID-19 VIRUS INFECTION: ICD-10-CM

## 2020-11-24 DIAGNOSIS — R43.2 TASTE ABSENT: ICD-10-CM

## 2020-11-24 DIAGNOSIS — R05.9 COUGH: ICD-10-CM

## 2020-11-24 LAB — COVID ORDER STATUS COVID19: NORMAL

## 2020-11-25 LAB
SARS-COV-2 RNA RESP QL NAA+PROBE: DETECTED
SPECIMEN SOURCE: ABNORMAL

## 2020-12-06 PROBLEM — U07.1 LAB TEST POSITIVE FOR DETECTION OF COVID-19 VIRUS: Status: ACTIVE | Noted: 2020-12-06

## 2020-12-08 ENCOUNTER — TELEMEDICINE (OUTPATIENT)
Dept: MEDICAL GROUP | Facility: MEDICAL CENTER | Age: 30
End: 2020-12-08
Payer: COMMERCIAL

## 2020-12-08 VITALS — RESPIRATION RATE: 16 BRPM | WEIGHT: 202 LBS | HEIGHT: 69 IN | BODY MASS INDEX: 29.92 KG/M2

## 2020-12-08 DIAGNOSIS — F41.1 GENERALIZED ANXIETY DISORDER: ICD-10-CM

## 2020-12-08 DIAGNOSIS — Z86.16 HISTORY OF 2019 NOVEL CORONAVIRUS DISEASE (COVID-19): ICD-10-CM

## 2020-12-08 PROCEDURE — 99213 OFFICE O/P EST LOW 20 MIN: CPT | Mod: 95,CR | Performed by: PHYSICIAN ASSISTANT

## 2020-12-08 RX ORDER — ALPRAZOLAM 0.5 MG/1
0.5 TABLET ORAL
Qty: 30 TAB | Refills: 2 | Status: SHIPPED | OUTPATIENT
Start: 2020-12-08 | End: 2021-04-05 | Stop reason: SDUPTHER

## 2020-12-08 ASSESSMENT — FIBROSIS 4 INDEX: FIB4 SCORE: 0.44

## 2020-12-08 NOTE — PROGRESS NOTES
Subjective:   Geremias Conte is a 29 y.o. male here today for anxiety and COVID-19 infection.    This evaluation was conducted via Zoom using secure and encrypted videoconferencing technology. The patient was in a private location in the [facility].    The patient's identity was confirmed and verbal consent was obtained for this virtual visit.      OPAL (generalized anxiety disorder)  This is a 29-year-old male on a virtual visit today.  Was diagnosed with COVID-19 on the 23rd of last month.  Has been doing well over the past week.  Still complains of a cough which is intermittent.  Denies any fevers or other significant symptoms.  Was cleared to return to work but spoke to his boss recently who told not to come back.  He was going to discuss his symptoms with me today.  His anxiety was worse over the past couple weeks because of the infection.  He was concerned about his family and his safety.  He has been able to control anxiety better over the past week.  Is taking Zoloft as directed.  Also requesting refill today of Xanax.  States that after being diagnosed with the virus he stopped drinking alcohol.  He has not touched anything for 2 weeks.  He has lost weight because of this.  He is exercising or least trying to.  Feels better about himself.  Like to continue this trend.       Current medicines (including changes today)  Current Outpatient Medications   Medication Sig Dispense Refill   • ALPRAZolam (XANAX) 0.5 MG Tab Take 1 Tab by mouth 1 time a day as needed for Anxiety for up to 30 days. 30 Tab 2   • sertraline (ZOLOFT) 100 MG Tab Take 2 Tabs by mouth every day. 180 Tab 1     No current facility-administered medications for this visit.      He  has a past medical history of Anxiety (8/8/2014) and Inappropriate sinus tachycardia (8/8/2014).    Social History and Family History were reviewed and updated.    ROS   No chest pain, no shortness of breath, no abdominal pain and all other systems were reviewed and  "are negative.       Objective:     Resp 16   Ht 1.753 m (5' 9\")   Wt 91.6 kg (202 lb)  Body mass index is 29.83 kg/m².   Physical Exam:  Constitutional: Alert, no distress.  Skin: Warm, dry, good turgor, no rashes in visible areas.  Eye: Equal, round and reactive, conjunctiva clear, lids normal.  ENMT: Lips without lesions, good dentition, oropharynx clear.  Neck: Trachea midline, no masses.   Lymph: No cervical or supraclavicular lymphadenopathy  Respiratory: Unlabored respiratory effort.  Psych: Alert and oriented x3, normal affect and mood.        Assessment and Plan:   The following treatment plan was discussed    1. OPAL (generalized anxiety disorder)  Chronic condition.  Stable.   reviewed.  Renewed Xanax 0.5 mg.  Provided a 30-day supply with 2 refills.  He will make an appointment on [facility] to see me in 3 months.  Continue Zoloft 20 mg daily.  Continue cessation of alcohol.  - ALPRAZolam (XANAX) 0.5 MG Tab; Take 1 Tab by mouth 1 time a day as needed for Anxiety for up to 30 days.  Dispense: 30 Tab; Refill: 2    2. History of 2019 novel coronavirus disease (COVID-19)  Acute, new onset condition.  Advised that he is well to return to work.  He still may have a residual cough but he is not contagious.  May contact me through [employer] with any concerns.      Followup: Return in about 3 months (around 3/8/2021), or if symptoms worsen or fail to improve, for Make appointment on my chart in 3 months.    Please note that this dictation was created using voice recognition software. I have made every reasonable attempt to correct obvious errors, but I expect that there are errors of grammar and possibly content that I did not discover before finalizing the note.           "

## 2020-12-08 NOTE — ASSESSMENT & PLAN NOTE
This is a 29-year-old male on a virtual visit today.  Was diagnosed with COVID-19 on the 23rd of last month.  Has been doing well over the past week.  Still complains of a cough which is intermittent.  Denies any fevers or other significant symptoms.  Was cleared to return to work but spoke to his boss recently who told not to come back.  He was going to discuss his symptoms with me today.  His anxiety was worse over the past couple weeks because of the infection.  He was concerned about his family and his safety.  He has been able to control anxiety better over the past week.  Is taking Zoloft as directed.  Also requesting refill today of Xanax.  States that after being diagnosed with the virus he stopped drinking alcohol.  He has not touched anything for 2 weeks.  He has lost weight because of this.  He is exercising or least trying to.  Feels better about himself.  Like to continue this trend.

## 2021-03-30 ENCOUNTER — TELEPHONE (OUTPATIENT)
Dept: MEDICAL GROUP | Facility: MEDICAL CENTER | Age: 31
End: 2021-03-30

## 2021-03-30 NOTE — TELEPHONE ENCOUNTER
ESTABLISHED PATIENT PRE-VISIT PLANNING     Patient was NOT contacted to complete PVP.     Note: Patient will not be contacted if there is no indication to call.     1.  Reviewed notes from the last few office visits within the medical group: Yes    2.  If any orders were placed at last visit or intended to be done for this visit (i.e. 6 mos follow-up), do we have Results/Consult Notes?         •  Labs - Labs were not ordered at last office visit.  Note: If patient appointment is for lab review and patient did not complete labs, check with provider if OK to reschedule patient until labs completed.       •  Imaging - Imaging was not ordered at last office visit.       •  Referrals - No referrals were ordered at last office visit.    3. Is this appointment scheduled as a Hospital Follow-Up? No    4.  Immunizations were updated in Epic using [employer] Information activity? Yes    5.  Patient is due for the following Health Maintenance Topics:   Health Maintenance Due   Topic Date Due   • IMM DTaP/Tdap/Td Vaccine (5 - Tdap) 12/15/2002   • IMM INFLUENZA (1) Never done

## 2021-04-05 ENCOUNTER — APPOINTMENT (OUTPATIENT)
Dept: MEDICAL GROUP | Facility: MEDICAL CENTER | Age: 31
End: 2021-04-05
Payer: COMMERCIAL

## 2021-04-05 DIAGNOSIS — F41.1 GENERALIZED ANXIETY DISORDER: ICD-10-CM

## 2021-04-05 RX ORDER — ALPRAZOLAM 0.5 MG/1
0.5 TABLET ORAL
Qty: 30 TABLET | Refills: 2 | Status: SHIPPED | OUTPATIENT
Start: 2021-04-05 | End: 2021-04-12 | Stop reason: SDUPTHER

## 2021-04-05 RX ORDER — ALPRAZOLAM 0.5 MG/1
TABLET ORAL
COMMUNITY
Start: 2021-03-01 | End: 2021-04-12

## 2021-04-12 ENCOUNTER — OFFICE VISIT (OUTPATIENT)
Dept: MEDICAL GROUP | Facility: MEDICAL CENTER | Age: 31
End: 2021-04-12
Payer: COMMERCIAL

## 2021-04-12 VITALS
TEMPERATURE: 99.1 F | SYSTOLIC BLOOD PRESSURE: 128 MMHG | HEIGHT: 69 IN | WEIGHT: 193.4 LBS | OXYGEN SATURATION: 96 % | HEART RATE: 95 BPM | BODY MASS INDEX: 28.64 KG/M2 | DIASTOLIC BLOOD PRESSURE: 62 MMHG

## 2021-04-12 DIAGNOSIS — F41.1 GENERALIZED ANXIETY DISORDER: ICD-10-CM

## 2021-04-12 PROBLEM — E66.9 OBESITY (BMI 30-39.9): Status: RESOLVED | Noted: 2020-06-15 | Resolved: 2021-04-12

## 2021-04-12 PROBLEM — R03.0 ELEVATED BLOOD PRESSURE READING: Status: RESOLVED | Noted: 2020-09-14 | Resolved: 2021-04-12

## 2021-04-12 PROCEDURE — 99214 OFFICE O/P EST MOD 30 MIN: CPT | Performed by: PHYSICIAN ASSISTANT

## 2021-04-12 RX ORDER — ALPRAZOLAM 0.5 MG/1
0.5 TABLET ORAL 2 TIMES DAILY
Qty: 60 TABLET | Refills: 0 | Status: SHIPPED | OUTPATIENT
Start: 2021-04-16 | End: 2021-05-03 | Stop reason: SDUPTHER

## 2021-04-12 RX ORDER — ESCITALOPRAM OXALATE 5 MG/1
5 TABLET ORAL DAILY
Qty: 30 TABLET | Refills: 0 | Status: SHIPPED
Start: 2021-04-12 | End: 2021-04-16

## 2021-04-12 ASSESSMENT — PATIENT HEALTH QUESTIONNAIRE - PHQ9: CLINICAL INTERPRETATION OF PHQ2 SCORE: 0

## 2021-04-12 ASSESSMENT — FIBROSIS 4 INDEX: FIB4 SCORE: 0.46

## 2021-04-12 NOTE — ASSESSMENT & PLAN NOTE
This is a pleasant 30-year-old male who is here today to discuss anxiety.  The other week I renewed Xanax.  He takes it typically once daily.  He states that a few months ago about 3 months ago he stopped Zoloft.  Cold turkey.  Did okay the first month or so but over the past few weeks has had significant panic attacks almost daily.  Will be driven to the ER but then not go inside as he will take Xanax about 4 tablets to calm him down.  States that his triggers include feeling his heartbeat.  He took Zoloft from several years.  Still dealt with anxiety.  Initially was placed on Lexapro but had anxiety that he thought may have been worsened by the medication.  Denies any depression.

## 2021-04-12 NOTE — PROGRESS NOTES
"Subjective:   Geremias Conte is a 30 y.o. male here today for anxiety with recent exacerbation.    OPAL (generalized anxiety disorder)  This is a pleasant 30-year-old male who is here today to discuss anxiety.  The other week I renewed Xanax.  He takes it typically once daily.  He states that a few months ago about 3 months ago he stopped Zoloft.  Cold turkey.  Did okay the first month or so but over the past few weeks has had significant panic attacks almost daily.  Will be driven to the ER but then not go inside as he will take Xanax about 4 tablets to calm him down.  States that his triggers include feeling his heartbeat.  He took Zoloft from several years.  Still dealt with anxiety.  Initially was placed on Lexapro but had anxiety that he thought may have been worsened by the medication.  Denies any depression.       Current medicines (including changes today)  Current Outpatient Medications   Medication Sig Dispense Refill   • escitalopram (LEXAPRO) 5 MG tablet Take 1 tablet by mouth every day for 30 days. 30 tablet 0   • [START ON 4/16/2021] ALPRAZolam (XANAX) 0.5 MG Tab Take 1 tablet by mouth 2 times a day for 30 days. 60 tablet 0     No current facility-administered medications for this visit.     He  has a past medical history of Anxiety (8/8/2014) and Inappropriate sinus tachycardia (8/8/2014).    Social History and Family History were reviewed and updated.    ROS   No chest pain, no shortness of breath, no abdominal pain and all other systems were reviewed and are negative.       Objective:     /62 (BP Location: Right arm, Patient Position: Sitting, BP Cuff Size: Adult)   Pulse 95   Temp 37.3 °C (99.1 °F) (Temporal)   Ht 1.753 m (5' 9\")   Wt 87.7 kg (193 lb 6.4 oz)   SpO2 96%  Body mass index is 28.56 kg/m².   Physical Exam:  Constitutional: Alert, no distress.  Skin: Warm, dry, good turgor, no rashes in visible areas.  Eye: Equal, round and reactive, conjunctiva clear, lids normal.  ENMT: Lips " without lesions, good dentition, oropharynx clear.  Neck: Trachea midline, no masses.   Lymph: No cervical or supraclavicular lymphadenopathy  Respiratory: Unlabored respiratory effort, lungs appear clear, no wheezes.  Cardiovascular: Regular rate rhythm.  Psych: Alert and oriented x3, normal affect and mood.        Assessment and Plan:   The following treatment plan was discussed    1. OPAL (generalized anxiety disorder)  Chronic condition with recent exacerbation.  Will start him on Lexapro 5 mg daily.  After 2 weeks contact me for increase of the dose to 10 mg.  Increase Xanax 2.5 mg twice daily until his symptoms are stable.  I did contact [facility]'s pharmacy regarding the initiation of the new dose to start this Friday.  They were aware of the notations of the prescription.  I will see him in July or he will contact me through my chart with any further request until then.  - escitalopram (LEXAPRO) 5 MG tablet; Take 1 tablet by mouth every day for 30 days.  Dispense: 30 tablet; Refill: 0  - ALPRAZolam (XANAX) 0.5 MG Tab; Take 1 tablet by mouth 2 times a day for 30 days.  Dispense: 60 tablet; Refill: 0         Followup: Return in about 3 months (around 7/12/2021), or if symptoms worsen or fail to improve.    Please note that this dictation was created using voice recognition software. I have made every reasonable attempt to correct obvious errors, but I expect that there are errors of grammar and possibly content that I did not discover before finalizing the note.

## 2021-04-16 DIAGNOSIS — F41.1 GENERALIZED ANXIETY DISORDER: ICD-10-CM

## 2021-04-16 RX ORDER — SERTRALINE HYDROCHLORIDE 100 MG/1
200 TABLET, FILM COATED ORAL DAILY
Qty: 180 TABLET | Refills: 1 | Status: SHIPPED
Start: 2021-04-16 | End: 2021-06-02

## 2021-04-28 ENCOUNTER — HOSPITAL ENCOUNTER (OUTPATIENT)
Dept: LAB | Facility: MEDICAL CENTER | Age: 31
End: 2021-04-28
Attending: PHYSICIAN ASSISTANT
Payer: COMMERCIAL

## 2021-04-28 ENCOUNTER — OFFICE VISIT (OUTPATIENT)
Dept: MEDICAL GROUP | Facility: MEDICAL CENTER | Age: 31
End: 2021-04-28

## 2021-04-28 VITALS
HEART RATE: 82 BPM | RESPIRATION RATE: 16 BRPM | SYSTOLIC BLOOD PRESSURE: 126 MMHG | OXYGEN SATURATION: 97 % | BODY MASS INDEX: 27.85 KG/M2 | TEMPERATURE: 100 F | WEIGHT: 188 LBS | HEIGHT: 69 IN | DIASTOLIC BLOOD PRESSURE: 74 MMHG

## 2021-04-28 DIAGNOSIS — F41.1 GENERALIZED ANXIETY DISORDER: ICD-10-CM

## 2021-04-28 DIAGNOSIS — R00.2 POUNDING HEARTBEAT: ICD-10-CM

## 2021-04-28 LAB
ALBUMIN SERPL BCP-MCNC: 4.8 G/DL (ref 3.2–4.9)
ALBUMIN/GLOB SERPL: 1.5 G/DL
ALP SERPL-CCNC: 75 U/L (ref 30–99)
ALT SERPL-CCNC: 29 U/L (ref 2–50)
ANION GAP SERPL CALC-SCNC: 13 MMOL/L (ref 7–16)
AST SERPL-CCNC: 17 U/L (ref 12–45)
BASOPHILS # BLD AUTO: 0.6 % (ref 0–1.8)
BASOPHILS # BLD: 0.04 K/UL (ref 0–0.12)
BILIRUB SERPL-MCNC: 0.6 MG/DL (ref 0.1–1.5)
BUN SERPL-MCNC: 12 MG/DL (ref 8–22)
CALCIUM SERPL-MCNC: 9.5 MG/DL (ref 8.4–10.2)
CHLORIDE SERPL-SCNC: 104 MMOL/L (ref 96–112)
CO2 SERPL-SCNC: 23 MMOL/L (ref 20–33)
CREAT SERPL-MCNC: 0.76 MG/DL (ref 0.5–1.4)
EOSINOPHIL # BLD AUTO: 0.05 K/UL (ref 0–0.51)
EOSINOPHIL NFR BLD: 0.8 % (ref 0–6.9)
ERYTHROCYTE [DISTWIDTH] IN BLOOD BY AUTOMATED COUNT: 38.1 FL (ref 35.9–50)
FASTING STATUS PATIENT QL REPORTED: NORMAL
GLOBULIN SER CALC-MCNC: 3.2 G/DL (ref 1.9–3.5)
GLUCOSE SERPL-MCNC: 91 MG/DL (ref 65–99)
HCT VFR BLD AUTO: 46.9 % (ref 42–52)
HGB BLD-MCNC: 16.7 G/DL (ref 14–18)
IMM GRANULOCYTES # BLD AUTO: 0.03 K/UL (ref 0–0.11)
IMM GRANULOCYTES NFR BLD AUTO: 0.5 % (ref 0–0.9)
LYMPHOCYTES # BLD AUTO: 1.63 K/UL (ref 1–4.8)
LYMPHOCYTES NFR BLD: 25.4 % (ref 22–41)
MCH RBC QN AUTO: 31 PG (ref 27–33)
MCHC RBC AUTO-ENTMCNC: 35.6 G/DL (ref 33.7–35.3)
MCV RBC AUTO: 87 FL (ref 81.4–97.8)
MONOCYTES # BLD AUTO: 0.49 K/UL (ref 0–0.85)
MONOCYTES NFR BLD AUTO: 7.6 % (ref 0–13.4)
NEUTROPHILS # BLD AUTO: 4.17 K/UL (ref 1.82–7.42)
NEUTROPHILS NFR BLD: 65.1 % (ref 44–72)
NRBC # BLD AUTO: 0 K/UL
NRBC BLD-RTO: 0 /100 WBC
PLATELET # BLD AUTO: 274 K/UL (ref 164–446)
PMV BLD AUTO: 9.9 FL (ref 9–12.9)
POTASSIUM SERPL-SCNC: 4 MMOL/L (ref 3.6–5.5)
PROT SERPL-MCNC: 8 G/DL (ref 6–8.2)
RBC # BLD AUTO: 5.39 M/UL (ref 4.7–6.1)
SODIUM SERPL-SCNC: 140 MMOL/L (ref 135–145)
TSH SERPL DL<=0.005 MIU/L-ACNC: 2.46 UIU/ML (ref 0.38–5.33)
WBC # BLD AUTO: 6.4 K/UL (ref 4.8–10.8)

## 2021-04-28 PROCEDURE — 84443 ASSAY THYROID STIM HORMONE: CPT

## 2021-04-28 PROCEDURE — 36415 COLL VENOUS BLD VENIPUNCTURE: CPT

## 2021-04-28 PROCEDURE — 80053 COMPREHEN METABOLIC PANEL: CPT

## 2021-04-28 PROCEDURE — 85025 COMPLETE CBC W/AUTO DIFF WBC: CPT

## 2021-04-28 PROCEDURE — 99214 OFFICE O/P EST MOD 30 MIN: CPT | Performed by: PHYSICIAN ASSISTANT

## 2021-04-28 RX ORDER — DULOXETIN HYDROCHLORIDE 60 MG/1
60 CAPSULE, DELAYED RELEASE ORAL DAILY
Qty: 30 CAPSULE | Refills: 3 | Status: SHIPPED | OUTPATIENT
Start: 2021-04-28 | End: 2022-10-10

## 2021-04-28 NOTE — ASSESSMENT & PLAN NOTE
This is a pleasant 30-year-old male accompanied by his wife, Nissa.  Has been having worsening anxiety and now depression.  Does not want to get out of bed to go to work.  Headaches.  Abdominal discomfort.  He had stopped Zoloft a while ago and I placed him on Lexapro which made his anxiety worse.  He is now taking Xanax multiple times a day.  2 tablets a day is not enough.  Complains of his heart pounding.  Concerned about his heart.  In the past has had EKGs which were negative.  Had a Zio patch few years ago that was negative.  States over the past couple days he is drinking less.

## 2021-04-28 NOTE — LETTER
April 28, 2021         Patient: Geremias Conte   YOB: 1990   Date of Visit: 4/28/2021           To Whom it May Concern:    Geremias Conte was seen in my clinic on 4/28/2021. He may return to work on 5/5/2021.    If you have any questions or concerns, please don't hesitate to call.        Sincerely,           Teodoro Sellers P.A.-C.  Electronically Signed

## 2021-04-28 NOTE — PROGRESS NOTES
"Subjective:   Geremias Conte is a 30 y.o. male here today for anxiety.    OPAL (generalized anxiety disorder)  This is a pleasant 30-year-old male accompanied by his wife, Nissa.  Has been having worsening anxiety and now depression.  Does not want to get out of bed to go to work.  Headaches.  Abdominal discomfort.  He had stopped Zoloft a while ago and I placed him on Lexapro which made his anxiety worse.  He is now taking Xanax multiple times a day.  2 tablets a day is not enough.  Complains of his heart pounding.  Concerned about his heart.  In the past has had EKGs which were negative.  Had a Zio patch few years ago that was negative.  States over the past couple days he is drinking less.       Current medicines (including changes today)  Current Outpatient Medications   Medication Sig Dispense Refill   • DULoxetine (CYMBALTA) 60 MG Cap DR Particles delayed-release capsule Take 1 capsule by mouth every day. 30 capsule 3   • sertraline (ZOLOFT) 100 MG Tab Take 2 Tablets by mouth every day. 180 tablet 1   • ALPRAZolam (XANAX) 0.5 MG Tab Take 1 tablet by mouth 2 times a day for 30 days. 60 tablet 0     No current facility-administered medications for this visit.     He  has a past medical history of Anxiety (8/8/2014) and Inappropriate sinus tachycardia (8/8/2014).    Social History and Family History were reviewed and updated.    ROS   No chest pain, no shortness of breath, no abdominal pain and all other systems were reviewed and are negative.       Objective:     /74   Pulse 82   Temp 37.8 °C (100 °F) (Temporal)   Resp 16   Ht 1.753 m (5' 9\")   Wt 85.3 kg (188 lb)   SpO2 97%  Body mass index is 27.76 kg/m².   Physical Exam:  Constitutional: Alert, no distress.  Skin: Warm, dry, good turgor, no rashes in visible areas.  Eye: Equal, round and reactive, conjunctiva clear, lids normal.  ENMT: Lips without lesions, good dentition, oropharynx clear.  Neck: Trachea midline, no masses.   Lymph: No cervical or " supraclavicular lymphadenopathy  Respiratory: Unlabored respiratory effort, lungs clear to auscultation, no wheezes, no ronchi.  Cardiovascular: Normal S1, S2, no murmur, no edema.  Psych: Alert and oriented x3, normal affect and mood.        Assessment and Plan:   The following treatment plan was discussed    1. OPAL (generalized anxiety disorder)  Chronic condition with recent exacerbation.  Referred to psychiatry urgently.  We will switch from Zoloft to Cymbalta.  No need to cross taper.  Discussed possible side effects with Cymbalta.  Ordered labs to be done today.  Nonfasting.  Contact me on Monday regarding Xanax use.  May provide a prescription for 3 times daily use.  Advised long-term wise he will need to cut back on the Xanax use.  - REFERRAL TO PSYCHIATRY  - TSH WITH REFLEX TO FT4; Future  - CBC WITH DIFFERENTIAL; Future  - Comp Metabolic Panel; Future  - DULoxetine (CYMBALTA) 60 MG Cap DR Particles delayed-release capsule; Take 1 capsule by mouth every day.  Dispense: 30 capsule; Refill: 3    2. Pounding heartbeat  New condition noted in chart but chronic, intermittent condition.  Discussed prior ZIO patch results.  I believe that his palpitation history or pounding heartbeat is likely secondary to anxiety.  Did order for Holter monitor.  May contact scheduling to make an appointment for the Holter monitor.  - HOLTER - Cardiology Performed (48HR); Future         Followup: Return in about 4 weeks (around 5/26/2021), or if symptoms worsen or fail to improve.    Please note that this dictation was created using voice recognition software. I have made every reasonable attempt to correct obvious errors, but I expect that there are errors of grammar and possibly content that I did not discover before finalizing the note.

## 2021-05-03 ENCOUNTER — PATIENT MESSAGE (OUTPATIENT)
Dept: MEDICAL GROUP | Facility: MEDICAL CENTER | Age: 31
End: 2021-05-03

## 2021-05-03 DIAGNOSIS — F41.1 GENERALIZED ANXIETY DISORDER: ICD-10-CM

## 2021-05-03 RX ORDER — ALPRAZOLAM 0.5 MG/1
0.5 TABLET ORAL 2 TIMES DAILY
Qty: 60 TABLET | Refills: 0 | Status: SHIPPED | OUTPATIENT
Start: 2021-05-03 | End: 2021-05-27

## 2021-05-25 ENCOUNTER — APPOINTMENT (OUTPATIENT)
Dept: MEDICAL GROUP | Facility: MEDICAL CENTER | Age: 31
End: 2021-05-25
Payer: COMMERCIAL

## 2021-05-26 DIAGNOSIS — F41.1 GENERALIZED ANXIETY DISORDER: ICD-10-CM

## 2021-05-27 RX ORDER — ALPRAZOLAM 0.5 MG/1
0.5 TABLET ORAL 2 TIMES DAILY
Qty: 60 TABLET | Refills: 0 | Status: SHIPPED | OUTPATIENT
Start: 2021-05-27 | End: 2021-06-26

## 2021-06-10 RX ORDER — PROPRANOLOL HYDROCHLORIDE 10 MG/1
TABLET ORAL
COMMUNITY
Start: 2021-06-01 | End: 2022-03-14

## 2021-10-02 ENCOUNTER — HOSPITAL ENCOUNTER (OUTPATIENT)
Facility: MEDICAL CENTER | Age: 31
End: 2021-10-02
Attending: EMERGENCY MEDICINE
Payer: COMMERCIAL

## 2021-10-02 ENCOUNTER — OFFICE VISIT (OUTPATIENT)
Dept: URGENT CARE | Facility: PHYSICIAN GROUP | Age: 31
End: 2021-10-02
Payer: COMMERCIAL

## 2021-10-02 VITALS
HEART RATE: 104 BPM | DIASTOLIC BLOOD PRESSURE: 110 MMHG | OXYGEN SATURATION: 97 % | HEIGHT: 69 IN | WEIGHT: 190 LBS | SYSTOLIC BLOOD PRESSURE: 148 MMHG | TEMPERATURE: 99.4 F | BODY MASS INDEX: 28.14 KG/M2 | RESPIRATION RATE: 18 BRPM

## 2021-10-02 DIAGNOSIS — R03.0 ELEVATED BLOOD PRESSURE READING: ICD-10-CM

## 2021-10-02 DIAGNOSIS — J06.9 VIRAL UPPER RESPIRATORY TRACT INFECTION: ICD-10-CM

## 2021-10-02 LAB
EXTERNAL QUALITY CONTROL: NORMAL
SARS-COV+SARS-COV-2 AG RESP QL IA.RAPID: NEGATIVE

## 2021-10-02 PROCEDURE — U0003 INFECTIOUS AGENT DETECTION BY NUCLEIC ACID (DNA OR RNA); SEVERE ACUTE RESPIRATORY SYNDROME CORONAVIRUS 2 (SARS-COV-2) (CORONAVIRUS DISEASE [COVID-19]), AMPLIFIED PROBE TECHNIQUE, MAKING USE OF HIGH THROUGHPUT TECHNOLOGIES AS DESCRIBED BY CMS-2020-01-R: HCPCS

## 2021-10-02 PROCEDURE — 87426 SARSCOV CORONAVIRUS AG IA: CPT | Performed by: EMERGENCY MEDICINE

## 2021-10-02 PROCEDURE — U0005 INFEC AGEN DETEC AMPLI PROBE: HCPCS

## 2021-10-02 PROCEDURE — 99213 OFFICE O/P EST LOW 20 MIN: CPT | Mod: CS | Performed by: EMERGENCY MEDICINE

## 2021-10-02 RX ORDER — ALPRAZOLAM 0.5 MG/1
0.5 TABLET ORAL NIGHTLY PRN
COMMUNITY
End: 2023-08-09 | Stop reason: SDUPTHER

## 2021-10-02 ASSESSMENT — FIBROSIS 4 INDEX: FIB4 SCORE: 0.33

## 2021-10-02 ASSESSMENT — ENCOUNTER SYMPTOMS
DIARRHEA: 0
COUGH: 1
HEADACHES: 0
CHILLS: 1
SORE THROAT: 1
VOMITING: 0
WHEEZING: 0
RHINORRHEA: 1
SHORTNESS OF BREATH: 1
NAUSEA: 0
SPUTUM PRODUCTION: 1

## 2021-10-02 NOTE — LETTER
October 2, 2021        Geremias Conte      A concern for COVID-19 has been identified and testing is in progress. They will be able to access their results through our electronic delivery system called [employer].     We are asking you to excuse absences while they follow self-isolation protocol per CDC guidelines.   • 10 days since symptoms first appeared and   • 24 hours with no fever without the use of fever-reducing medications and   • Other symptoms of COVID-19 are improving*  *Loss of taste and smell may persist for weeks or months after recovery and need not delay the end of isolation    Most people do not require testing to decide when they can be around others. If results are negative your employee must continue to follow the self-isolation protocol.    If the results of testing are positive then they will be contacted by the [facility] for further instructions on duration of self-isolation and return to work protocol. In general this will also follow the CDC guidelines with a minimum of 10 days from the onset with improving symptoms and no fever.    This is the only note that will be provided from [facility] for this visit. Please schedule a visit with primary care if documents for FMLA, disability, or unemployment are required.                           Wayne Anderson M.D.

## 2021-10-02 NOTE — PROGRESS NOTES
"Kaylin Conte is a 30 y.o. male who presents with Cough (congestion, sore throat, sob, x3 days. )            URI   This is a new problem. Episode onset: 3 days. The problem has been gradually worsening. There has been no fever. Associated symptoms include congestion, coughing, ear pain, rhinorrhea and a sore throat. Pertinent negatives include no chest pain, diarrhea, headaches, nausea, plugged ear sensation, rash, sneezing, vomiting or wheezing.   COVID-19 infection within the past year, COVID-19 vaccinated.    Review of Systems   Constitutional: Positive for chills.   HENT: Positive for congestion, ear pain, rhinorrhea and sore throat. Negative for hearing loss, nosebleeds and sneezing.    Respiratory: Positive for cough, sputum production and shortness of breath. Negative for wheezing.    Cardiovascular: Negative for chest pain.   Gastrointestinal: Negative for diarrhea, nausea and vomiting.   Skin: Negative for rash.   Neurological: Negative for headaches.   Endo/Heme/Allergies: Negative for environmental allergies.              Objective     /104   Pulse (!) 104   Temp 37.4 °C (99.4 °F) (Temporal)   Resp 18   Ht 1.753 m (5' 9\")   Wt 86.2 kg (190 lb)   SpO2 97%   BMI 28.06 kg/m²      Physical Exam  Vitals reviewed.   Constitutional:       General: He is not in acute distress.     Appearance: He is well-developed. He is not ill-appearing.   HENT:      Head: Normocephalic.      Jaw: No trismus.      Right Ear: Tympanic membrane and ear canal normal.      Left Ear: Tympanic membrane and ear canal normal.      Nose: Mucosal edema present. No rhinorrhea.      Mouth/Throat:      Pharynx: Uvula midline. No oropharyngeal exudate or posterior oropharyngeal erythema.      Tonsils: No tonsillar abscesses.   Eyes:      Conjunctiva/sclera: Conjunctivae normal.   Neck:      Trachea: Trachea normal.   Cardiovascular:      Rate and Rhythm: Normal rate and regular rhythm.      Heart sounds: Normal heart " sounds.   Pulmonary:      Effort: Pulmonary effort is normal.      Breath sounds: Normal breath sounds.   Musculoskeletal:      Cervical back: Neck supple.   Lymphadenopathy:      Cervical: No cervical adenopathy.   Skin:     General: Skin is warm and dry.   Neurological:      Mental Status: He is alert.   Psychiatric:         Behavior: Behavior normal. Behavior is cooperative.                             Assessment & Plan        1. Viral upper respiratory tract infection  Recommended supportive care measures, including rest, increasing oral fluid intake and use of over-the-counter medications for relief of symptoms.  - POCT SARS-COV Antigen MADELINE (Symptomatic Only)    2. Elevated blood pressure reading  F/U PCP recheck

## 2021-10-03 DIAGNOSIS — J06.9 VIRAL UPPER RESPIRATORY TRACT INFECTION: ICD-10-CM

## 2021-10-03 LAB — COVID ORDER STATUS COVID19: NORMAL

## 2021-10-04 LAB
SARS-COV-2 RNA RESP QL NAA+PROBE: NOTDETECTED
SPECIMEN SOURCE: NORMAL

## 2021-10-06 ENCOUNTER — TELEPHONE (OUTPATIENT)
Dept: URGENT CARE | Facility: PHYSICIAN GROUP | Age: 31
End: 2021-10-06

## 2021-10-06 NOTE — TELEPHONE ENCOUNTER
----- Message from Wayne Anderson M.D. sent at 10/4/2021  9:02 AM PDT -----  Please notify patient of negative test for Co-19; no change in treatment plan.

## 2022-03-14 ENCOUNTER — OFFICE VISIT (OUTPATIENT)
Dept: MEDICAL GROUP | Facility: MEDICAL CENTER | Age: 32
End: 2022-03-14
Payer: COMMERCIAL

## 2022-03-14 VITALS
DIASTOLIC BLOOD PRESSURE: 90 MMHG | TEMPERATURE: 98.8 F | HEIGHT: 69 IN | SYSTOLIC BLOOD PRESSURE: 140 MMHG | HEART RATE: 88 BPM | WEIGHT: 203 LBS | BODY MASS INDEX: 30.07 KG/M2 | OXYGEN SATURATION: 96 %

## 2022-03-14 DIAGNOSIS — F41.1 GENERALIZED ANXIETY DISORDER: ICD-10-CM

## 2022-03-14 DIAGNOSIS — Z00.00 PREVENTATIVE HEALTH CARE: ICD-10-CM

## 2022-03-14 DIAGNOSIS — I10 ESSENTIAL HYPERTENSION: ICD-10-CM

## 2022-03-14 PROBLEM — R00.2 POUNDING HEARTBEAT: Status: RESOLVED | Noted: 2017-05-16 | Resolved: 2022-03-14

## 2022-03-14 PROCEDURE — 99214 OFFICE O/P EST MOD 30 MIN: CPT | Performed by: PHYSICIAN ASSISTANT

## 2022-03-14 RX ORDER — LISINOPRIL 10 MG/1
10 TABLET ORAL DAILY
Qty: 30 TABLET | Refills: 3 | Status: SHIPPED | OUTPATIENT
Start: 2022-03-14 | End: 2022-09-20 | Stop reason: SDUPTHER

## 2022-03-14 ASSESSMENT — FIBROSIS 4 INDEX: FIB4 SCORE: 0.35

## 2022-03-14 NOTE — ASSESSMENT & PLAN NOTE
This is a pleasant 31-year-old male accompanied by his wife, Nissa.  Currently seen by psychiatry.  Is on Cymbalta.  Doing better.  Also received Xanax.  Down to taking 1 Xanax daily at 0.5 mg.

## 2022-03-14 NOTE — PROGRESS NOTES
"Subjective:   Geremias Conte is a 31 y.o. male here today for anxiety and hypertension.    OPLA (generalized anxiety disorder)  This is a pleasant 31-year-old male accompanied by his wife, Nissa.  Currently seen by psychiatry.  Is on Cymbalta.  Doing better.  Also received Xanax.  Down to taking 1 Xanax daily at 0.5 mg.     Essential hypertension  Recently noted to have elevated blood pressure during his psychiatry visits.  He has had hypertension in the past.  Was on medication.  Is currently only drinking on the weekends.  Maybe a sixpack each for Saturday and Sunday.  Yesterday drink in the afternoon but nothing in the evening or night.       Current medicines (including changes today)  Current Outpatient Medications   Medication Sig Dispense Refill   • lisinopril (PRINIVIL) 10 MG Tab Take 1 Tablet by mouth every day. 30 Tablet 3   • ALPRAZolam (XANAX) 0.5 MG Tab Take 0.5 mg by mouth at bedtime as needed for Sleep.     • DULoxetine (CYMBALTA) 60 MG Cap DR Particles delayed-release capsule Take 1 capsule by mouth every day. 30 capsule 3     No current facility-administered medications for this visit.     He  has a past medical history of Anxiety (8/8/2014) and Inappropriate sinus tachycardia (8/8/2014).    Social History and Family History were reviewed and updated.    ROS   No chest pain, no shortness of breath, no abdominal pain and all other systems were reviewed and are negative.       Objective:     /90 (BP Location: Right arm, Patient Position: Sitting, BP Cuff Size: Adult)   Pulse 88   Temp 37.1 °C (98.8 °F) (Temporal)   Ht 1.753 m (5' 9\")   Wt 92.1 kg (203 lb)   SpO2 96%  Body mass index is 29.98 kg/m².   Physical Exam:  Constitutional: Alert, no distress.  Skin: Warm, dry, good turgor, no rashes in visible areas.  Eye: Equal, round and reactive, conjunctiva clear, lids normal.  ENMT: Lips without lesions, good dentition, oropharynx clear.  Neck: Trachea midline, no masses.   Lymph: No cervical or " supraclavicular lymphadenopathy  Respiratory: Unlabored respiratory effort, lungs appear clear, no wheezes.  Cardiovascular: Regular rate and rhythm.  Psych: Alert and oriented x3, normal affect and mood.        Assessment and Plan:   The following treatment plan was discussed    1. OPAL (generalized anxiety disorder)  Chronic condition.  Stable.  Continue to follow with psychiatry.  Continue Cymbalta and Xanax as directed per psychiatry.    2. Essential hypertension  New condition noted in chart but chronic.  Blood pressure has been well controlled until recently.  Do not believe alcohol is currently playing a role.  We will start him on lisinopril 10 mg daily.  Discussed side effects.  I will see him in 6 weeks.  - lisinopril (PRINIVIL) 10 MG Tab; Take 1 Tablet by mouth every day.  Dispense: 30 Tablet; Refill: 3    3. Preventative health care  Order labs.  Perform prior to next office visit.  Fast 8 hours.  - Comp Metabolic Panel; Future  - CBC WITH DIFFERENTIAL; Future  - Lipid Profile; Future  - HEMOGLOBIN A1C; Future         Followup: Return in about 6 weeks (around 4/25/2022), or if symptoms worsen or fail to improve.    Please note that this dictation was created using voice recognition software. I have made every reasonable attempt to correct obvious errors, but I expect that there are errors of grammar and possibly content that I did not discover before finalizing the note.

## 2022-03-14 NOTE — ASSESSMENT & PLAN NOTE
Recently noted to have elevated blood pressure during his psychiatry visits.  He has had hypertension in the past.  Was on medication.  Is currently only drinking on the weekends.  Maybe a sixpack each for Saturday and Sunday.  Yesterday drink in the afternoon but nothing in the evening or night.

## 2022-03-28 ENCOUNTER — HOSPITAL ENCOUNTER (OUTPATIENT)
Dept: LAB | Facility: MEDICAL CENTER | Age: 32
End: 2022-03-28
Attending: PHYSICIAN ASSISTANT
Payer: COMMERCIAL

## 2022-03-28 DIAGNOSIS — Z00.00 PREVENTATIVE HEALTH CARE: ICD-10-CM

## 2022-03-28 LAB
ALBUMIN SERPL BCP-MCNC: 5 G/DL (ref 3.2–4.9)
ALBUMIN/GLOB SERPL: 1.9 G/DL
ALP SERPL-CCNC: 79 U/L (ref 30–99)
ALT SERPL-CCNC: 67 U/L (ref 2–50)
ANION GAP SERPL CALC-SCNC: 13 MMOL/L (ref 7–16)
AST SERPL-CCNC: 38 U/L (ref 12–45)
BASOPHILS # BLD AUTO: 0.6 % (ref 0–1.8)
BASOPHILS # BLD: 0.03 K/UL (ref 0–0.12)
BILIRUB SERPL-MCNC: 0.5 MG/DL (ref 0.1–1.5)
BUN SERPL-MCNC: 10 MG/DL (ref 8–22)
CALCIUM SERPL-MCNC: 9.2 MG/DL (ref 8.5–10.5)
CHLORIDE SERPL-SCNC: 104 MMOL/L (ref 96–112)
CHOLEST SERPL-MCNC: 292 MG/DL (ref 100–199)
CO2 SERPL-SCNC: 23 MMOL/L (ref 20–33)
CREAT SERPL-MCNC: 0.82 MG/DL (ref 0.5–1.4)
EOSINOPHIL # BLD AUTO: 0.11 K/UL (ref 0–0.51)
EOSINOPHIL NFR BLD: 2.3 % (ref 0–6.9)
ERYTHROCYTE [DISTWIDTH] IN BLOOD BY AUTOMATED COUNT: 40.4 FL (ref 35.9–50)
EST. AVERAGE GLUCOSE BLD GHB EST-MCNC: 88 MG/DL
FASTING STATUS PATIENT QL REPORTED: NORMAL
GFR SERPLBLD CREATININE-BSD FMLA CKD-EPI: 121 ML/MIN/1.73 M 2
GLOBULIN SER CALC-MCNC: 2.6 G/DL (ref 1.9–3.5)
GLUCOSE SERPL-MCNC: 89 MG/DL (ref 65–99)
HBA1C MFR BLD: 4.7 % (ref 4–5.6)
HCT VFR BLD AUTO: 46.6 % (ref 42–52)
HDLC SERPL-MCNC: 40 MG/DL
HGB BLD-MCNC: 16.2 G/DL (ref 14–18)
IMM GRANULOCYTES # BLD AUTO: 0.03 K/UL (ref 0–0.11)
IMM GRANULOCYTES NFR BLD AUTO: 0.6 % (ref 0–0.9)
LDLC SERPL CALC-MCNC: 180 MG/DL
LYMPHOCYTES # BLD AUTO: 1.66 K/UL (ref 1–4.8)
LYMPHOCYTES NFR BLD: 35.1 % (ref 22–41)
MCH RBC QN AUTO: 31.9 PG (ref 27–33)
MCHC RBC AUTO-ENTMCNC: 34.8 G/DL (ref 33.7–35.3)
MCV RBC AUTO: 91.7 FL (ref 81.4–97.8)
MONOCYTES # BLD AUTO: 0.42 K/UL (ref 0–0.85)
MONOCYTES NFR BLD AUTO: 8.9 % (ref 0–13.4)
NEUTROPHILS # BLD AUTO: 2.48 K/UL (ref 1.82–7.42)
NEUTROPHILS NFR BLD: 52.5 % (ref 44–72)
NRBC # BLD AUTO: 0 K/UL
NRBC BLD-RTO: 0 /100 WBC
PLATELET # BLD AUTO: 273 K/UL (ref 164–446)
PMV BLD AUTO: 10.4 FL (ref 9–12.9)
POTASSIUM SERPL-SCNC: 4.5 MMOL/L (ref 3.6–5.5)
PROT SERPL-MCNC: 7.6 G/DL (ref 6–8.2)
RBC # BLD AUTO: 5.08 M/UL (ref 4.7–6.1)
SODIUM SERPL-SCNC: 140 MMOL/L (ref 135–145)
TRIGL SERPL-MCNC: 362 MG/DL (ref 0–149)
WBC # BLD AUTO: 4.7 K/UL (ref 4.8–10.8)

## 2022-03-28 PROCEDURE — 36415 COLL VENOUS BLD VENIPUNCTURE: CPT

## 2022-03-28 PROCEDURE — 80053 COMPREHEN METABOLIC PANEL: CPT

## 2022-03-28 PROCEDURE — 85025 COMPLETE CBC W/AUTO DIFF WBC: CPT

## 2022-03-28 PROCEDURE — 83036 HEMOGLOBIN GLYCOSYLATED A1C: CPT

## 2022-03-28 PROCEDURE — 80061 LIPID PANEL: CPT

## 2022-04-25 ENCOUNTER — APPOINTMENT (OUTPATIENT)
Dept: MEDICAL GROUP | Facility: MEDICAL CENTER | Age: 32
End: 2022-04-25
Payer: COMMERCIAL

## 2022-10-06 ENCOUNTER — APPOINTMENT (OUTPATIENT)
Dept: RADIOLOGY | Facility: MEDICAL CENTER | Age: 32
End: 2022-10-06
Attending: EMERGENCY MEDICINE
Payer: COMMERCIAL

## 2022-10-06 ENCOUNTER — HOSPITAL ENCOUNTER (EMERGENCY)
Facility: MEDICAL CENTER | Age: 32
End: 2022-10-06
Attending: EMERGENCY MEDICINE
Payer: COMMERCIAL

## 2022-10-06 VITALS
HEART RATE: 81 BPM | SYSTOLIC BLOOD PRESSURE: 159 MMHG | RESPIRATION RATE: 20 BRPM | WEIGHT: 209.88 LBS | TEMPERATURE: 97.6 F | DIASTOLIC BLOOD PRESSURE: 97 MMHG | BODY MASS INDEX: 30.99 KG/M2 | OXYGEN SATURATION: 94 %

## 2022-10-06 DIAGNOSIS — R51.9 ACUTE NONINTRACTABLE HEADACHE, UNSPECIFIED HEADACHE TYPE: ICD-10-CM

## 2022-10-06 DIAGNOSIS — R03.0 ELEVATED BLOOD PRESSURE READING: ICD-10-CM

## 2022-10-06 LAB
ALBUMIN SERPL BCP-MCNC: 4.9 G/DL (ref 3.2–4.9)
ALBUMIN/GLOB SERPL: 1.7 G/DL
ALP SERPL-CCNC: 94 U/L (ref 30–99)
ALT SERPL-CCNC: 49 U/L (ref 2–50)
ANION GAP SERPL CALC-SCNC: 14 MMOL/L (ref 7–16)
AST SERPL-CCNC: 27 U/L (ref 12–45)
BASOPHILS # BLD AUTO: 0.6 % (ref 0–1.8)
BASOPHILS # BLD: 0.04 K/UL (ref 0–0.12)
BILIRUB SERPL-MCNC: 0.6 MG/DL (ref 0.1–1.5)
BUN SERPL-MCNC: 11 MG/DL (ref 8–22)
CALCIUM SERPL-MCNC: 10.1 MG/DL (ref 8.5–10.5)
CHLORIDE SERPL-SCNC: 101 MMOL/L (ref 96–112)
CO2 SERPL-SCNC: 25 MMOL/L (ref 20–33)
CREAT SERPL-MCNC: 0.77 MG/DL (ref 0.5–1.4)
EKG IMPRESSION: NORMAL
EOSINOPHIL # BLD AUTO: 0.06 K/UL (ref 0–0.51)
EOSINOPHIL NFR BLD: 0.8 % (ref 0–6.9)
ERYTHROCYTE [DISTWIDTH] IN BLOOD BY AUTOMATED COUNT: 38.5 FL (ref 35.9–50)
GFR SERPLBLD CREATININE-BSD FMLA CKD-EPI: 123 ML/MIN/1.73 M 2
GLOBULIN SER CALC-MCNC: 2.9 G/DL (ref 1.9–3.5)
GLUCOSE SERPL-MCNC: 90 MG/DL (ref 65–99)
HCT VFR BLD AUTO: 43 % (ref 42–52)
HGB BLD-MCNC: 15.8 G/DL (ref 14–18)
IMM GRANULOCYTES # BLD AUTO: 0.03 K/UL (ref 0–0.11)
IMM GRANULOCYTES NFR BLD AUTO: 0.4 % (ref 0–0.9)
LYMPHOCYTES # BLD AUTO: 1.49 K/UL (ref 1–4.8)
LYMPHOCYTES NFR BLD: 20.9 % (ref 22–41)
MCH RBC QN AUTO: 32.1 PG (ref 27–33)
MCHC RBC AUTO-ENTMCNC: 36.7 G/DL (ref 33.7–35.3)
MCV RBC AUTO: 87.4 FL (ref 81.4–97.8)
MONOCYTES # BLD AUTO: 0.42 K/UL (ref 0–0.85)
MONOCYTES NFR BLD AUTO: 5.9 % (ref 0–13.4)
NEUTROPHILS # BLD AUTO: 5.08 K/UL (ref 1.82–7.42)
NEUTROPHILS NFR BLD: 71.4 % (ref 44–72)
NRBC # BLD AUTO: 0 K/UL
NRBC BLD-RTO: 0 /100 WBC
PLATELET # BLD AUTO: 317 K/UL (ref 164–446)
PMV BLD AUTO: 9.9 FL (ref 9–12.9)
POTASSIUM SERPL-SCNC: 3.7 MMOL/L (ref 3.6–5.5)
PROT SERPL-MCNC: 7.8 G/DL (ref 6–8.2)
RBC # BLD AUTO: 4.92 M/UL (ref 4.7–6.1)
SODIUM SERPL-SCNC: 140 MMOL/L (ref 135–145)
T4 FREE SERPL-MCNC: 1.25 NG/DL (ref 0.93–1.7)
TROPONIN T SERPL-MCNC: <6 NG/L (ref 6–19)
TSH SERPL DL<=0.005 MIU/L-ACNC: 3.23 UIU/ML (ref 0.38–5.33)
WBC # BLD AUTO: 7.1 K/UL (ref 4.8–10.8)

## 2022-10-06 PROCEDURE — 96374 THER/PROPH/DIAG INJ IV PUSH: CPT

## 2022-10-06 PROCEDURE — 84443 ASSAY THYROID STIM HORMONE: CPT

## 2022-10-06 PROCEDURE — 700111 HCHG RX REV CODE 636 W/ 250 OVERRIDE (IP): Performed by: EMERGENCY MEDICINE

## 2022-10-06 PROCEDURE — 84484 ASSAY OF TROPONIN QUANT: CPT

## 2022-10-06 PROCEDURE — 99284 EMERGENCY DEPT VISIT MOD MDM: CPT

## 2022-10-06 PROCEDURE — 84439 ASSAY OF FREE THYROXINE: CPT

## 2022-10-06 PROCEDURE — 36415 COLL VENOUS BLD VENIPUNCTURE: CPT

## 2022-10-06 PROCEDURE — 96375 TX/PRO/DX INJ NEW DRUG ADDON: CPT

## 2022-10-06 PROCEDURE — 700105 HCHG RX REV CODE 258: Performed by: EMERGENCY MEDICINE

## 2022-10-06 PROCEDURE — 93005 ELECTROCARDIOGRAM TRACING: CPT | Performed by: EMERGENCY MEDICINE

## 2022-10-06 PROCEDURE — 71045 X-RAY EXAM CHEST 1 VIEW: CPT

## 2022-10-06 PROCEDURE — 93005 ELECTROCARDIOGRAM TRACING: CPT

## 2022-10-06 PROCEDURE — 80053 COMPREHEN METABOLIC PANEL: CPT

## 2022-10-06 PROCEDURE — 85025 COMPLETE CBC W/AUTO DIFF WBC: CPT

## 2022-10-06 RX ORDER — METOCLOPRAMIDE HYDROCHLORIDE 5 MG/ML
10 INJECTION INTRAMUSCULAR; INTRAVENOUS ONCE
Status: COMPLETED | OUTPATIENT
Start: 2022-10-06 | End: 2022-10-06

## 2022-10-06 RX ORDER — LORAZEPAM 2 MG/ML
1 INJECTION INTRAMUSCULAR ONCE
Status: COMPLETED | OUTPATIENT
Start: 2022-10-06 | End: 2022-10-06

## 2022-10-06 RX ORDER — DIPHENHYDRAMINE HYDROCHLORIDE 50 MG/ML
25 INJECTION INTRAMUSCULAR; INTRAVENOUS ONCE
Status: COMPLETED | OUTPATIENT
Start: 2022-10-06 | End: 2022-10-06

## 2022-10-06 RX ORDER — SODIUM CHLORIDE, SODIUM LACTATE, POTASSIUM CHLORIDE, CALCIUM CHLORIDE 600; 310; 30; 20 MG/100ML; MG/100ML; MG/100ML; MG/100ML
1000 INJECTION, SOLUTION INTRAVENOUS ONCE
Status: COMPLETED | OUTPATIENT
Start: 2022-10-06 | End: 2022-10-06

## 2022-10-06 RX ORDER — KETOROLAC TROMETHAMINE 30 MG/ML
15 INJECTION, SOLUTION INTRAMUSCULAR; INTRAVENOUS ONCE
Status: COMPLETED | OUTPATIENT
Start: 2022-10-06 | End: 2022-10-06

## 2022-10-06 RX ADMIN — LORAZEPAM 1 MG: 2 INJECTION INTRAMUSCULAR; INTRAVENOUS at 14:31

## 2022-10-06 RX ADMIN — DIPHENHYDRAMINE HYDROCHLORIDE 25 MG: 50 INJECTION, SOLUTION INTRAMUSCULAR; INTRAVENOUS at 14:21

## 2022-10-06 RX ADMIN — METOCLOPRAMIDE 10 MG: 5 INJECTION, SOLUTION INTRAMUSCULAR; INTRAVENOUS at 14:20

## 2022-10-06 RX ADMIN — SODIUM CHLORIDE, POTASSIUM CHLORIDE, SODIUM LACTATE AND CALCIUM CHLORIDE 1000 ML: 600; 310; 30; 20 INJECTION, SOLUTION INTRAVENOUS at 14:32

## 2022-10-06 RX ADMIN — KETOROLAC TROMETHAMINE 15 MG: 30 INJECTION, SOLUTION INTRAMUSCULAR at 14:21

## 2022-10-06 ASSESSMENT — FIBROSIS 4 INDEX: FIB4 SCORE: 0.51

## 2022-10-06 ASSESSMENT — PAIN DESCRIPTION - PAIN TYPE: TYPE: ACUTE PAIN

## 2022-10-06 NOTE — DISCHARGE INSTRUCTIONS
Please discuss the following findings with your regular doctor:  DX-CHEST-PORTABLE (1 VIEW)   Final Result      No acute cardiac or pulmonary abnormalities are identified.        Labs Reviewed   CBC WITH DIFFERENTIAL - Abnormal; Notable for the following components:       Result Value    MCHC 36.7 (*)     Lymphocytes 20.90 (*)     All other components within normal limits    Narrative:     Biotin intake of greater than 5 mg per day may interfere with  troponin levels, causing false low values.   COMP METABOLIC PANEL    Narrative:     Biotin intake of greater than 5 mg per day may interfere with  troponin levels, causing false low values.   TROPONIN    Narrative:     Biotin intake of greater than 5 mg per day may interfere with  troponin levels, causing false low values.   ESTIMATED GFR    Narrative:     Biotin intake of greater than 5 mg per day may interfere with  troponin levels, causing false low values.   TSH WITH REFLEX TO FT4   FREE THYROXINE

## 2022-10-06 NOTE — ED NOTES
Pt to G23. Pt is A&Ox4 and awake in bed. Pt placed on cardiac monitor, pulse ox, and automatic BP. VSS, saturating above 95% on RA, SR in the 90s. Call light within reach and chart up for ERP.

## 2022-10-06 NOTE — ED PROVIDER NOTES
ED Provider Note    Scribed for Selwyn Roberson M.D. by Aamir Naqvi. 10/6/2022  1:57 PM    Primary care provider: Teodoro Selelrs P.A.-C.  Means of arrival: Walk in  History obtained from: Patient  History limited by: None    CHIEF COMPLAINT  Chief Complaint   Patient presents with    HTN (Uncontrolled)     Taking medication started 5 days ago.     Nausea    Shortness of Breath     With activity     Head Ache       HPI  Geremias Conte is a 31 y.o. male who presents to the Emergency Department for evaluation of headache onset a few weeks ago. Patient notes his headache is intermittent and minimally alleviated with Xanax  and antihypertensives. He notes he was recently diagnosed with hypertension (190+ throughout the week) and recently prescribed  medications. Patient reports associated nausea and vomiting. He admits to a history of anxiety but denies any history of heart attack  or brain bleed. Patient states he has been taking Cymbalta daily  and Xanax as  needed for his anxiety. He switched from Lexapro to Cymbalta a few weeks ago. He notes he has 2 drinks per day excluding the past 3 days. Patient denies any recreational drug use.      REVIEW OF SYSTEMS  Pertinent positives include: headache, hypertension, nausea, and vomiting. See history of present illness. All other systems are negative.   C    PAST MEDICAL HISTORY   has a past medical history of Anxiety (8/8/2014) and Inappropriate sinus tachycardia (8/8/2014).    SURGICAL HISTORY  patient denies any surgical history    SOCIAL HISTORY  Social History     Tobacco Use    Smoking status: Never    Smokeless tobacco: Never   Vaping Use    Vaping Use: Never used   Substance Use Topics    Alcohol use: Yes     Alcohol/week: 7.2 oz     Types: 12 Cans of beer per week     Comment: mostly weekends (5-20beers; 12pack on avg)    Drug use: No      Social History     Substance and Sexual Activity   Drug Use No       FAMILY HISTORY  Family History   Problem Relation Age of  Onset    Hypertension Mother     Hypertension Father        CURRENT MEDICATIONS  Home Medications       Reviewed by Anna Galeas R.N. (Registered Nurse) on 10/06/22 at 1312  Med List Status: Complete     Medication Last Dose Status   ALPRAZolam (XANAX) 0.5 MG Tab 10/6/2022 Active   DULoxetine (CYMBALTA) 60 MG Cap DR Particles delayed-release capsule 10/6/2022 Active   lisinopril (PRINIVIL) 10 MG Tab 10/6/2022 Active                    ALLERGIES  Allergies   Allergen Reactions    Lexapro Anxiety     Made patient more anxious       PHYSICAL EXAM  VITAL SIGNS: BP (!) 182/122   Pulse (!) 105   Temp 36.4 °C (97.5 °F) (Temporal)   Resp 18   Wt 95.2 kg (209 lb 14.1 oz)   SpO2 97%   BMI 30.99 kg/m²     Constitutional: Alert in no apparent distress.  HENT: No signs of trauma, Bilateral external ears normal, Nose normal. Uvula midline.   Eyes: Pupils are equal and reactive, Conjunctiva normal, Non-icteric.   Neck: Normal range of motion, No tenderness, Supple, No stridor.   Lymphatic: No lymphadenopathy noted.   Cardiovascular: Regular rate and rhythm, no murmurs.   Thorax & Lungs: Normal breath sounds, No respiratory distress, No wheezing, No chest tenderness.   Abdomen:  Soft, No tenderness, No peritoneal signs, No masses, No pulsatile masses.   Skin: Warm, Dry, No erythema, No rash.   Back: No bony tenderness, No CVA tenderness.   Extremities: Intact distal pulses, No edema, No tenderness, No cyanosis.  Musculoskeletal: Good range of motion in all major joints. No tenderness to palpation or major deformities noted.   Neurologic: Alert , Normal motor function, Normal sensory function, No focal deficits noted. Cranial nerves II through XII intact.  5 out of 5 strength x4.  Sensation intact light touch.  Normal finger-nose-finger.  Normal reflexes bilaterally.  No clonus. EOMI. PERRL.    Psychiatric: Affect normal, Judgment normal, Mood normal.     DIAGNOSTIC STUDIES / PROCEDURES    LABS  Labs Reviewed   CBC WITH  DIFFERENTIAL - Abnormal; Notable for the following components:       Result Value    MCHC 36.7 (*)     Lymphocytes 20.90 (*)     All other components within normal limits    Narrative:     Biotin intake of greater than 5 mg per day may interfere with  troponin levels, causing false low values.   COMP METABOLIC PANEL    Narrative:     Biotin intake of greater than 5 mg per day may interfere with  troponin levels, causing false low values.   TROPONIN    Narrative:     Biotin intake of greater than 5 mg per day may interfere with  troponin levels, causing false low values.   ESTIMATED GFR    Narrative:     Biotin intake of greater than 5 mg per day may interfere with  troponin levels, causing false low values.   TSH WITH REFLEX TO FT4   FREE THYROXINE      All labs reviewed by me.    EKG  12 Lead EKG interpreted by me to show:   Report   Date Value Ref Range Status   10/06/2022       [facility] Emergency Dept.    Test Date:  2022-10-06  Pt Name:    TOMASZ ELI                  Department: ER  MRN:        [id number]                      Room:  Gender:     Male                         Technician: [id number]  :        1990                   Requested By:ER TRIAGE PROTOCOL  Order #:    [id number]                    Reading MD: Selwyn Roberson MD    Measurements  Intervals                                Axis  Rate:       101                          P:          36  FL:         158                          QRS:        80  QRSD:       92                           T:          4  QT:         344  QTc:        446    Interpretive Statements  Sinus tachycardia  Compared to ECG 2020 19:44:52  Sinus rhythm no longer present  Inferior Q waves no longer present  Q waves no longer present  Electronically Signed On 10-6-2022 14:51:03 PDT by Selwyn Roberson MD                RADIOLOGY  DX-CHEST-PORTABLE (1 VIEW)   Final Result      No acute cardiac or pulmonary abnormalities are identified.        The radiologist's  interpretation of all radiological studies have been reviewed by me.    COURSE & MEDICAL DECISION MAKING  Nursing notes, VS, PMSFHx reviewed in chart.    31 y.o. male p/w chief complaint of headaches with hypertension.    1:57 PM Patient seen and examined at bedside.      I verified that the patient was wearing a mask and I was wearing appropriate PPE every time I entered the room. The patient's mask was on the patient at all times during my encounter except for a brief view of the oropharynx.     The differential diagnoses include but are not limited to:     #Headache  Headache not maximal at onset and feels similar to prior headaches, doubt SAH.     Pt denies numbness or weakness. Doubt CVA or ICH.  Pt given migraine cocktail in ED w/ resolution of sx prior to dc  No household members w/ similar to suggest CO poisoning.   No fever or AMS to suggest encephilitis or meningitis.  Pt w/ unremarkable neuro exam.  Pt w/ resolution of sx prior to d/c.  Ambulates w/ steady gait w/o assistance.       #Hypertension, Nausea, Vomiting  CXR to r/o cardiopulmonary abnormality.  Troponin unremarkable  Neuro exam normal was normal  Heart score 1    4:02 PM - Patient was reevaluated at bedside. Discussed lab and radiology results with the patient and informed them of plans for discharge. Patient verbalizes understanding and agreement to this plan of care.        The patient will return for new or worsening symptoms and is stable at the time of discharge.    The patient is referred to a primary physician for blood pressure management, diabetic screening, and for all other preventative health concerns.      DISPOSITION:  Patient will be discharged home in stable condition.    FOLLOW UP:  Teodoro Sellers, P.A.-C.  [address]  [address]  [facility] [address] [phone]    In 3 days      [facility] Emergency Dept  [address] [facility] [address] [phone]    If symptoms worsen      FINAL  IMPRESSION  1. Elevated blood pressure reading    2. Acute nonintractable headache, unspecified headache type          I, Aamir Naqvi (Scribe), am scribing for, and in the presence of, Selwyn Roberson M.D..    Electronically signed by: Aamir Naqvi (Scribe), 10/6/2022    ISelwyn M.D. personally performed the services described in this documentation, as scribed by Aamir Naqvi in my presence, and it is both accurate and complete.    The note accurately reflects work and decisions made by me.  Selwyn Roberson M.D.  10/6/2022  8:55 PM

## 2022-10-10 ENCOUNTER — OFFICE VISIT (OUTPATIENT)
Dept: MEDICAL GROUP | Facility: MEDICAL CENTER | Age: 32
End: 2022-10-10
Payer: COMMERCIAL

## 2022-10-10 VITALS
HEART RATE: 82 BPM | WEIGHT: 205 LBS | DIASTOLIC BLOOD PRESSURE: 110 MMHG | TEMPERATURE: 98.6 F | HEIGHT: 69 IN | OXYGEN SATURATION: 98 % | BODY MASS INDEX: 30.36 KG/M2 | SYSTOLIC BLOOD PRESSURE: 162 MMHG

## 2022-10-10 DIAGNOSIS — E66.9 OBESITY (BMI 30-39.9): ICD-10-CM

## 2022-10-10 DIAGNOSIS — F41.1 GENERALIZED ANXIETY DISORDER: ICD-10-CM

## 2022-10-10 DIAGNOSIS — I10 ESSENTIAL HYPERTENSION: ICD-10-CM

## 2022-10-10 DIAGNOSIS — R11.0 NAUSEA: ICD-10-CM

## 2022-10-10 PROCEDURE — 99214 OFFICE O/P EST MOD 30 MIN: CPT | Performed by: PHYSICIAN ASSISTANT

## 2022-10-10 RX ORDER — HYDROCHLOROTHIAZIDE 12.5 MG/1
12.5 TABLET ORAL DAILY
Qty: 30 TABLET | Refills: 3 | Status: SHIPPED | OUTPATIENT
Start: 2022-10-10 | End: 2022-11-11 | Stop reason: SDUPTHER

## 2022-10-10 RX ORDER — LISINOPRIL 30 MG/1
30 TABLET ORAL DAILY
Qty: 30 TABLET | Refills: 3 | Status: SHIPPED
Start: 2022-10-10 | End: 2022-11-09

## 2022-10-10 RX ORDER — VORTIOXETINE 5 MG/1
TABLET, FILM COATED ORAL
COMMUNITY
Start: 2022-09-29 | End: 2023-01-06

## 2022-10-10 ASSESSMENT — FIBROSIS 4 INDEX: FIB4 SCORE: 0.37

## 2022-10-10 NOTE — PROGRESS NOTES
"Subjective:   Geremias Conte is a 31 y.o. male here today for hypertension.    Essential hypertension  This is a pleasant 31-year-old male accompanied by his wife, Nissa, and young daughter.  Blood pressure has been high recently.  At home it is typically 180/100.  He was seen in a couple days ago at the ER.  Labs were drawn.  He was told to follow-up with me.  He is currently taking lisinopril but has doubled up the dose taking 20 mg for the past few days.  Follows with psychiatry now for his history of anxiety.  Duloxetine was discontinued a few weeks ago and he was placed on Trintellix.  Has been on it for about 1 week at 5 mg.  Over the past week or so he has also been dealing with nausea which has gotten worse.  He is unsure why he has been nauseous.       Current medicines (including changes today)  Current Outpatient Medications   Medication Sig Dispense Refill    lisinopril (PRINIVIL) 30 MG tablet Take 1 Tablet by mouth every day. 30 Tablet 3    hydroCHLOROthiazide (HYDRODIURIL) 12.5 MG tablet Take 1 Tablet by mouth every day. 30 Tablet 3    TRINTELLIX 5 MG Tab       ALPRAZolam (XANAX) 0.5 MG Tab Take 0.5 mg by mouth at bedtime as needed for Sleep.       No current facility-administered medications for this visit.     He  has a past medical history of Anxiety (8/8/2014) and Inappropriate sinus tachycardia (8/8/2014).    Social History and Family History were reviewed and updated.    ROS   No chest pain, no shortness of breath, no abdominal pain and all other systems were reviewed and are negative.       Objective:     BP (!) 162/110 (BP Location: Left arm, Patient Position: Sitting, BP Cuff Size: Adult)   Pulse 82   Temp 37 °C (98.6 °F) (Temporal)   Ht 1.753 m (5' 9\")   Wt 93 kg (205 lb)   SpO2 98%  Body mass index is 30.27 kg/m².   Physical Exam:  Constitutional: Alert, no distress.  Skin: Warm, dry, good turgor, no rashes in visible areas.  Eye: Equal, round and reactive, conjunctiva clear, lids " normal.  ENMT: Lips without lesions, good dentition, oropharynx clear.  Neck: Trachea midline, no masses.   Lymph: No cervical or supraclavicular lymphadenopathy  Respiratory: Unlabored respiratory effort, lungs appear clear.  Psych: Alert and oriented x3, normal affect and mood.        Assessment and Plan:   The following treatment plan was discussed    1. Essential hypertension  Chronic condition.  Uncontrolled.  Unsure if medication could be a factor given that he recently discontinued Cymbalta.  Continue lisinopril but increase dose to 30 mg.  Added HCTZ at 12.5 mg.  Restart HCTZ after 3 days of taking 30 mg of lisinopril.  Discussed possible side effects.  Reviewed recent labs ordered by the ER.  - lisinopril (PRINIVIL) 30 MG tablet; Take 1 Tablet by mouth every day.  Dispense: 30 Tablet; Refill: 3  - hydroCHLOROthiazide (HYDRODIURIL) 12.5 MG tablet; Take 1 Tablet by mouth every day.  Dispense: 30 Tablet; Refill: 3    2. Nausea  Chronic condition with recent exacerbation.  Appears to be secondary to Trintellix.  Advised to follow-up with psychiatry.  Hopefully with further time on the medication his symptoms will improve.    3. OPAL (generalized anxiety disorder)  Chronic condition.  Stable.  Continue to follow with psychiatry.  Continue Trintellix at 5 mg.  Continue Xanax as directed.         Followup: Return in about 4 weeks (around 11/7/2022), or if symptoms worsen or fail to improve.    Please note that this dictation was created using voice recognition software. I have made every reasonable attempt to correct obvious errors, but I expect that there are errors of grammar and possibly content that I did not discover before finalizing the note.

## 2022-11-03 DIAGNOSIS — I10 ESSENTIAL HYPERTENSION: ICD-10-CM

## 2022-11-03 RX ORDER — LISINOPRIL 40 MG/1
40 TABLET ORAL DAILY
Qty: 30 TABLET | Refills: 2 | Status: SHIPPED
Start: 2022-11-03 | End: 2022-11-11

## 2022-11-09 DIAGNOSIS — I10 ESSENTIAL HYPERTENSION: ICD-10-CM

## 2022-11-09 RX ORDER — AMLODIPINE BESYLATE 5 MG/1
5 TABLET ORAL DAILY
Qty: 30 TABLET | Refills: 1 | Status: SHIPPED | OUTPATIENT
Start: 2022-11-09 | End: 2022-11-16 | Stop reason: SDUPTHER

## 2022-11-11 ENCOUNTER — OFFICE VISIT (OUTPATIENT)
Dept: MEDICAL GROUP | Facility: MEDICAL CENTER | Age: 32
End: 2022-11-11
Payer: COMMERCIAL

## 2022-11-11 VITALS
HEART RATE: 85 BPM | WEIGHT: 209 LBS | SYSTOLIC BLOOD PRESSURE: 140 MMHG | BODY MASS INDEX: 30.96 KG/M2 | HEIGHT: 69 IN | TEMPERATURE: 98.7 F | DIASTOLIC BLOOD PRESSURE: 98 MMHG | OXYGEN SATURATION: 98 %

## 2022-11-11 DIAGNOSIS — I10 ESSENTIAL HYPERTENSION: ICD-10-CM

## 2022-11-11 DIAGNOSIS — F41.1 GENERALIZED ANXIETY DISORDER: ICD-10-CM

## 2022-11-11 PROCEDURE — 99214 OFFICE O/P EST MOD 30 MIN: CPT | Performed by: PHYSICIAN ASSISTANT

## 2022-11-11 RX ORDER — HYDROCHLOROTHIAZIDE 25 MG/1
25 TABLET ORAL DAILY
Qty: 90 TABLET | Refills: 1 | Status: SHIPPED
Start: 2022-11-11 | End: 2023-01-06

## 2022-11-11 RX ORDER — LOSARTAN POTASSIUM 100 MG/1
100 TABLET ORAL DAILY
Qty: 30 TABLET | Refills: 2 | Status: SHIPPED
Start: 2022-11-11 | End: 2023-01-06

## 2022-11-11 ASSESSMENT — FIBROSIS 4 INDEX: FIB4 SCORE: 0.37

## 2022-11-11 NOTE — ASSESSMENT & PLAN NOTE
This is a pleasant 31-year-old male accompanied by his 2 sons.  Chronic history of hypertension.  Recently placed him on amlodipine.  Has been on it for couple days.  Blood pressure still elevated.  His psychiatrist took him off of Trintellix for a few weeks believing that that was causing his blood pressure to be elevated.  He has a chronic history of elevated blood pressure.  His parents both have hypertension.  He has stopped drinking for a few months but that did not control his blood pressure either.  He is currently on lisinopril 40 mg, amlodipine 5 mg and hydrochlorothiazide 12.5 mg.

## 2022-11-11 NOTE — PROGRESS NOTES
"Subjective:   Geremias Conte is a 31 y.o. male here today for hypertension and anxiety.    Essential hypertension  This is a pleasant 31-year-old male accompanied by his 2 sons.  Chronic history of hypertension.  Recently placed him on amlodipine.  Has been on it for couple days.  Blood pressure still elevated.  His psychiatrist took him off of Trintellix for a few weeks believing that that was causing his blood pressure to be elevated.  He has a chronic history of elevated blood pressure.  His parents both have hypertension.  He has stopped drinking for a few months but that did not control his blood pressure either.  He is currently on lisinopril 40 mg, amlodipine 5 mg and hydrochlorothiazide 12.5 mg.       Current medicines (including changes today)  Current Outpatient Medications   Medication Sig Dispense Refill    hydroCHLOROthiazide (HYDRODIURIL) 25 MG Tab Take 1 Tablet by mouth every day for 90 days. 90 Tablet 1    losartan (COZAAR) 100 MG Tab Take 1 Tablet by mouth every day. 30 Tablet 2    amLODIPine (NORVASC) 5 MG Tab Take 1 Tablet by mouth every day. 30 Tablet 1    TRINTELLIX 5 MG Tab       ALPRAZolam (XANAX) 0.5 MG Tab Take 0.5 mg by mouth at bedtime as needed for Sleep.       No current facility-administered medications for this visit.     He  has a past medical history of Anxiety (8/8/2014) and Inappropriate sinus tachycardia (8/8/2014).    Social History and Family History were reviewed and updated.    ROS   No chest pain, no shortness of breath, no abdominal pain and all other systems were reviewed and are negative.       Objective:     BP (!) 140/98 (BP Location: Right arm, Patient Position: Sitting, BP Cuff Size: Adult)   Pulse 85   Temp 37.1 °C (98.7 °F) (Temporal)   Ht 1.753 m (5' 9\")   Wt 94.8 kg (209 lb)   SpO2 98%  Body mass index is 30.86 kg/m².   Physical Exam:  Constitutional: Alert, no distress.  Skin: Warm, dry, good turgor, no rashes in visible areas.  Eye: Equal, round and reactive, " conjunctiva clear, lids normal.  ENMT: Lips without lesions, good dentition, oropharynx clear.  Neck: Trachea midline, no masses.   Lymph: No cervical or supraclavicular lymphadenopathy  Respiratory: Unlabored respiratory effort, lungs appear clear.  Psych: Alert and oriented x3, normal affect and mood.        Assessment and Plan:   The following treatment plan was discussed    1. Essential hypertension  Chronic condition.  Still a bit high.  Has improved somewhat.  Continue amlodipine at 5 mg.  We will change lisinopril to losartan 100 mg.  Also will increase hydrochlorothiazide to 25 mg.  Continue lifestyle modifications.  May restart Trintellix.  Continue to check blood pressure at home.  It does appear that his cuff may be off.  I will see him in 2 months.  He will contact me through [employer] with any concerns.  Check kidney function in 3 weeks.  - hydroCHLOROthiazide (HYDRODIURIL) 25 MG Tab; Take 1 Tablet by mouth every day for 90 days.  Dispense: 90 Tablet; Refill: 1  - losartan (COZAAR) 100 MG Tab; Take 1 Tablet by mouth every day.  Dispense: 30 Tablet; Refill: 2  - Basic Metabolic Panel; Future    2. OPAL (generalized anxiety disorder)  Chronic condition.  Stable.  Advised he may restart Trintellix.  Continue to follow with psychiatry.  Continue cessation of alcohol.         Followup: Return in about 2 months (around 1/11/2023), or if symptoms worsen or fail to improve.    Please note that this dictation was created using voice recognition software. I have made every reasonable attempt to correct obvious errors, but I expect that there are errors of grammar and possibly content that I did not discover before finalizing the note.

## 2022-11-16 DIAGNOSIS — I10 ESSENTIAL HYPERTENSION: ICD-10-CM

## 2022-11-16 RX ORDER — AMLODIPINE BESYLATE 5 MG/1
10 TABLET ORAL DAILY
Qty: 30 TABLET | Refills: 1 | Status: SHIPPED | OUTPATIENT
Start: 2022-11-16 | End: 2022-12-04 | Stop reason: SDUPTHER

## 2022-11-22 ENCOUNTER — DOCUMENTATION (OUTPATIENT)
Dept: VASCULAR LAB | Facility: MEDICAL CENTER | Age: 32
End: 2022-11-22
Payer: COMMERCIAL

## 2022-11-22 NOTE — PROGRESS NOTES
Left message for patient to call back and schedule new patient appt with Dr. Perez, next available ok.

## 2022-12-09 ENCOUNTER — OFFICE VISIT (OUTPATIENT)
Dept: MEDICAL GROUP | Facility: MEDICAL CENTER | Age: 32
End: 2022-12-09
Payer: COMMERCIAL

## 2022-12-09 VITALS
OXYGEN SATURATION: 97 % | DIASTOLIC BLOOD PRESSURE: 78 MMHG | HEIGHT: 69 IN | TEMPERATURE: 97.5 F | SYSTOLIC BLOOD PRESSURE: 120 MMHG | WEIGHT: 202.6 LBS | HEART RATE: 87 BPM | BODY MASS INDEX: 30.01 KG/M2

## 2022-12-09 DIAGNOSIS — I10 ESSENTIAL HYPERTENSION: ICD-10-CM

## 2022-12-09 DIAGNOSIS — F41.1 GENERALIZED ANXIETY DISORDER: ICD-10-CM

## 2022-12-09 DIAGNOSIS — I1A.0 RESISTANT HYPERTENSION: ICD-10-CM

## 2022-12-09 DIAGNOSIS — Z11.59 ENCOUNTER FOR HEPATITIS C SCREENING TEST FOR LOW RISK PATIENT: ICD-10-CM

## 2022-12-09 PROCEDURE — 99214 OFFICE O/P EST MOD 30 MIN: CPT | Performed by: PHYSICIAN ASSISTANT

## 2022-12-09 RX ORDER — SPIRONOLACTONE 25 MG/1
25 TABLET ORAL DAILY
Qty: 30 TABLET | Refills: 3 | Status: SHIPPED
Start: 2022-12-09 | End: 2022-12-09

## 2022-12-09 RX ORDER — SERTRALINE HYDROCHLORIDE 25 MG/1
25 TABLET, FILM COATED ORAL DAILY
COMMUNITY
Start: 2022-12-08 | End: 2023-08-09

## 2022-12-09 ASSESSMENT — PATIENT HEALTH QUESTIONNAIRE - PHQ9: CLINICAL INTERPRETATION OF PHQ2 SCORE: 0

## 2022-12-09 ASSESSMENT — FIBROSIS 4 INDEX: FIB4 SCORE: 0.37

## 2022-12-09 NOTE — ASSESSMENT & PLAN NOTE
This is a pleasant 31-year-old male accompanied by his family.  He states his blood pressure was elevated today.  Contacted me through [employer].  Was seen yesterday by his psychiatrist.  Blood pressure was checked with a digital machine.  States that when his blood pressure is elevated he becomes extremely nervous.  He was also restarted on sertraline yesterday.  Has not picked up the medication yet.  Also started drinking alcohol but not to any extent as he was previously.

## 2022-12-09 NOTE — PROGRESS NOTES
"Subjective:   Geremias Conte is a 31 y.o. male here today for hypertension and anxiety.    Essential hypertension  This is a pleasant 31-year-old male accompanied by his family.  He states his blood pressure was elevated today.  Contacted me through [facility].  Was seen yesterday by his psychiatrist.  Blood pressure was checked with a digital machine.  States that when his blood pressure is elevated he becomes extremely nervous.  He was also restarted on sertraline yesterday.  Has not picked up the medication yet.  Also started drinking alcohol but not to any extent as he was previously.       Current medicines (including changes today)  Current Outpatient Medications   Medication Sig Dispense Refill    sertraline (ZOLOFT) 25 MG tablet Take 25 mg by mouth every day.      amLODIPine (NORVASC) 10 MG Tab Take 1 Tablet by mouth every day. 90 Tablet 1    hydroCHLOROthiazide (HYDRODIURIL) 25 MG Tab Take 1 Tablet by mouth every day for 90 days. 90 Tablet 1    losartan (COZAAR) 100 MG Tab Take 1 Tablet by mouth every day. 30 Tablet 2    TRINTELLIX 5 MG Tab       ALPRAZolam (XANAX) 0.5 MG Tab Take 0.5 mg by mouth at bedtime as needed for Sleep.       No current facility-administered medications for this visit.     He  has a past medical history of Anxiety (8/8/2014) and Inappropriate sinus tachycardia (8/8/2014).    Social History and Family History were reviewed and updated.    ROS   No chest pain, no shortness of breath, no abdominal pain and all other systems were reviewed and are negative.       Objective:     /80 (BP Location: Left arm, Patient Position: Sitting, BP Cuff Size: Adult)   Pulse 87   Temp 36.4 °C (97.5 °F) (Temporal)   Ht 1.753 m (5' 9\")   Wt 91.9 kg (202 lb 9.6 oz)   SpO2 97%  Body mass index is 29.92 kg/m².   Physical Exam:  Constitutional: Alert, no distress.  Skin: Warm, dry, good turgor, no rashes in visible areas.  Eye: Equal, round and reactive, conjunctiva clear, lids normal.  ENMT: Lips " without lesions, good dentition, oropharynx clear.  Neck: Trachea midline, no masses.   Lymph: No cervical or supraclavicular lymphadenopathy  Respiratory: Unlabored respiratory effort, lungs appear clear.  Psych: Alert and oriented x3, normal affect and mood.        Assessment and Plan:   The following treatment plan was discussed    1. Essential hypertension  Chronic condition.  I repeated his blood pressure and it was in normal range.  120/78.  Discuss not checking his blood pressure as his cuff is likely off.  Advised not to start spironolactone which I sent over this morning.  Continue with the 3 hypertensive medications that he is currently on.  He has an appointment per his request with vascular next month.  Watch alcohol consumption as this could certainly spike blood pressure.    2. OPAL (generalized anxiety disorder)  Chronic condition.  Uncontrolled.  Discussed restarting sertraline.  Continue to follow with psychiatry.    3. Encounter for hepatitis C screening test for low risk patient  Hepatitis C viral antibody ordered.  Low risk.  - HEP C VIRUS ANTIBODY; Future         Followup: Return if symptoms worsen or fail to improve.    Please note that this dictation was created using voice recognition software. I have made every reasonable attempt to correct obvious errors, but I expect that there are errors of grammar and possibly content that I did not discover before finalizing the note.

## 2022-12-22 ENCOUNTER — HOSPITAL ENCOUNTER (OUTPATIENT)
Dept: LAB | Facility: MEDICAL CENTER | Age: 32
End: 2022-12-22
Attending: PHYSICIAN ASSISTANT
Payer: COMMERCIAL

## 2022-12-22 DIAGNOSIS — Z11.59 ENCOUNTER FOR HEPATITIS C SCREENING TEST FOR LOW RISK PATIENT: ICD-10-CM

## 2022-12-22 DIAGNOSIS — I10 ESSENTIAL HYPERTENSION: ICD-10-CM

## 2022-12-22 LAB
ANION GAP SERPL CALC-SCNC: 10 MMOL/L (ref 7–16)
BUN SERPL-MCNC: 10 MG/DL (ref 8–22)
CALCIUM SERPL-MCNC: 9.3 MG/DL (ref 8.4–10.2)
CHLORIDE SERPL-SCNC: 103 MMOL/L (ref 96–112)
CO2 SERPL-SCNC: 25 MMOL/L (ref 20–33)
CREAT SERPL-MCNC: 0.72 MG/DL (ref 0.5–1.4)
GFR SERPLBLD CREATININE-BSD FMLA CKD-EPI: 125 ML/MIN/1.73 M 2
GLUCOSE SERPL-MCNC: 96 MG/DL (ref 65–99)
HCV AB SER QL: NORMAL
POTASSIUM SERPL-SCNC: 3.3 MMOL/L (ref 3.6–5.5)
SODIUM SERPL-SCNC: 138 MMOL/L (ref 135–145)

## 2022-12-22 PROCEDURE — 36415 COLL VENOUS BLD VENIPUNCTURE: CPT

## 2022-12-22 PROCEDURE — 86803 HEPATITIS C AB TEST: CPT

## 2022-12-22 PROCEDURE — 80048 BASIC METABOLIC PNL TOTAL CA: CPT

## 2023-01-06 ENCOUNTER — OFFICE VISIT (OUTPATIENT)
Dept: VASCULAR LAB | Facility: MEDICAL CENTER | Age: 33
End: 2023-01-06
Attending: FAMILY MEDICINE
Payer: COMMERCIAL

## 2023-01-06 VITALS
WEIGHT: 205 LBS | BODY MASS INDEX: 30.36 KG/M2 | HEART RATE: 92 BPM | HEIGHT: 69 IN | DIASTOLIC BLOOD PRESSURE: 91 MMHG | SYSTOLIC BLOOD PRESSURE: 149 MMHG

## 2023-01-06 DIAGNOSIS — E66.09 CLASS 1 OBESITY DUE TO EXCESS CALORIES WITH SERIOUS COMORBIDITY AND BODY MASS INDEX (BMI) OF 30.0 TO 30.9 IN ADULT: ICD-10-CM

## 2023-01-06 DIAGNOSIS — E78.2 MIXED HYPERLIPIDEMIA: ICD-10-CM

## 2023-01-06 DIAGNOSIS — I1A.0 RESISTANT HYPERTENSION: ICD-10-CM

## 2023-01-06 DIAGNOSIS — R06.83 SNORING: ICD-10-CM

## 2023-01-06 DIAGNOSIS — E88.810 METABOLIC SYNDROME: ICD-10-CM

## 2023-01-06 PROBLEM — E66.811 CLASS 1 OBESITY DUE TO EXCESS CALORIES WITH SERIOUS COMORBIDITY AND BODY MASS INDEX (BMI) OF 30.0 TO 30.9 IN ADULT: Status: ACTIVE | Noted: 2023-01-06

## 2023-01-06 PROCEDURE — 99204 OFFICE O/P NEW MOD 45 MIN: CPT | Performed by: FAMILY MEDICINE

## 2023-01-06 PROCEDURE — 99212 OFFICE O/P EST SF 10 MIN: CPT

## 2023-01-06 RX ORDER — TELMISARTAN 80 MG/1
80 TABLET ORAL
Qty: 90 TABLET | Refills: 3 | Status: SHIPPED | OUTPATIENT
Start: 2023-01-06 | End: 2024-01-01

## 2023-01-06 RX ORDER — DOXAZOSIN MESYLATE 1 MG/1
1 TABLET ORAL
Qty: 90 TABLET | Refills: 3 | Status: SHIPPED | OUTPATIENT
Start: 2023-01-06 | End: 2023-01-10 | Stop reason: SDUPTHER

## 2023-01-06 ASSESSMENT — FIBROSIS 4 INDEX: FIB4 SCORE: 0.38

## 2023-01-06 ASSESSMENT — ENCOUNTER SYMPTOMS
ORTHOPNEA: 0
FOCAL WEAKNESS: 0
TREMORS: 0
COUGH: 0
PALPITATIONS: 1
WEAKNESS: 0
HEMOPTYSIS: 0
CLAUDICATION: 0
SHORTNESS OF BREATH: 0
SEIZURES: 0
DIZZINESS: 0
WHEEZING: 0
FEVER: 0
HEADACHES: 0
CHILLS: 0

## 2023-01-06 NOTE — PROGRESS NOTES
RESISTANT HTN CLINIC - INITIAL EVALUATION   01/06/23    Geremias Conte is a male seen for evaluation of resistant HTN and management.   Geremias Conte is initially referred by Teodoro Sellers P.A.-C    Subjective    HTN:  Current/interval HTN concerns:  has seen PCP several times with dosing adjustments and changes to meds.  Withholding of trintellix and etoh have not helped.  No current sx   Home BP log: >140s/90s most days   Adherence to current HTN meds: compliant all of the time   Lifestyle factors:  Weight Change: stable   BMI Readings from Last 3 Encounters:   01/06/23 30.27 kg/m²   12/09/22 29.92 kg/m²   11/11/22 30.86 kg/m²      Diet pattern: common adult  Daily salt intake estimate:  High     EtOH: Yes, Details: heavier use,  3+ beers/day   Exercise habits: no regular exercise program  Perceived barriers to care: none   Pertinent HTN hx (reviewed at initial visit):   Age at HTN dx: more than >3mo ago noted very elevated 190s/?  Past HTN medications: lisinopril   HTN crises:  No prior hospitalization or crises   Interfering substances/contributing factors:  None    Hyperlipidemia:  Stable, no current concerns  Current treatment: lifestyle changes  and no current medications   Latest Reference Range & Units Most Recent   Cholesterol,Tot 100 - 199 mg/dL 292 (H)  3/28/22 10:09   Triglycerides 0 - 149 mg/dL 362 (H)  3/28/22 10:09   HDL >=40 mg/dL 40  3/28/22 10:09   LDL <100 mg/dL 180 (H)  3/28/22 10:09     Antiplatelet/anticoagulation: No, no bleeding noted     Type 2 DM: No     CKD: No     Sleeping disorder/ANN: Yes, Details: heavy snoring, had PSG in past few years ago and told no sleep apena      Hypothyroidism: No     Patient Active Problem List    Diagnosis Date Noted    Metabolic syndrome 01/06/2023    Class 1 obesity due to excess calories with serious comorbidity and body mass index (BMI) of 30.0 to 30.9 in adult 01/06/2023    Nausea 10/10/2022    Obesity (BMI 30-39.9) 10/10/2022    History of 2019 novel  "coronavirus disease (COVID-19) 12/06/2020    Snoring 03/16/2020    Irritable bowel syndrome with diarrhea 03/16/2020    Transaminitis 08/05/2019    ([facility]) H/O alcohol dependence 04/15/2019    Decreased hearing of both ears 02/19/2019    OPAL (generalized anxiety disorder) 06/23/2017    Mixed hyperlipidemia 06/12/2017    Resistant hypertension 08/05/2016     History reviewed. No pertinent surgical history.    Current Outpatient Medications:     doxazosin, 1 mg, Oral, QHS    telmisartan, 80 mg, Oral, QHS    sertraline, 25 mg, Oral, DAILY, Taking    amLODIPine, 10 mg, Oral, DAILY, Taking    ALPRAZolam, 0.5 mg, Oral, HS PRN, PRN   Lexapro    Family History   Problem Relation Age of Onset    Hypertension Mother     Hyperlipidemia Mother     Hypertension Father     No Known Problems Brother     No Known Problems Brother     Diabetes Maternal Uncle     Stroke Neg Hx     Heart Attack Neg Hx      Social History     Tobacco Use    Smoking status: Never    Smokeless tobacco: Never   Vaping Use    Vaping Use: Never used   Substance Use Topics    Alcohol use: Yes     Alcohol/week: 7.2 oz     Types: 12 Cans of beer per week     Comment: mostly weekends (5-20beers; 12pack on avg)    Drug use: No      Review of Systems   Constitutional:  Negative for chills, fever and malaise/fatigue.   Respiratory:  Negative for cough, hemoptysis, shortness of breath and wheezing.    Cardiovascular:  Positive for palpitations. Negative for chest pain, orthopnea, claudication and leg swelling.   Neurological:  Negative for dizziness, tremors, focal weakness, seizures, weakness and headaches.         Objective    Vitals:    01/06/23 1358 01/06/23 1402   BP: (!) 158/95 (!) 149/91   BP Location: Left arm Left arm   Patient Position: Sitting Sitting   BP Cuff Size: Adult Adult   Pulse: 92    Weight: 93 kg (205 lb)    Height: 1.753 m (5' 9\")      Body mass index is 30.27 kg/m².  BP Readings from Last 5 Encounters:   01/06/23 (!) 149/91   12/09/22 120/78 "   11/11/22 (!) 140/98   10/10/22 (!) 162/110   10/06/22 (!) 159/97      Physical Exam  Vitals reviewed.   Constitutional:       General: He is not in acute distress.     Appearance: He is well-developed. He is not diaphoretic.   HENT:      Head: Normocephalic and atraumatic.   Neck:      Thyroid: No thyromegaly.      Vascular: No JVD.   Cardiovascular:      Rate and Rhythm: Normal rate and regular rhythm.      Pulses: Normal pulses.      Heart sounds: No murmur heard.  Pulmonary:      Effort: No respiratory distress.      Breath sounds: Normal breath sounds. No wheezing or rales.   Musculoskeletal:         General: No swelling or tenderness.   Neurological:      General: No focal deficit present.      Mental Status: He is oriented to person, place, and time.      Cranial Nerves: No cranial nerve deficit.      Coordination: Coordination normal.   Psychiatric:         Mood and Affect: Mood normal.         Behavior: Behavior normal.      Accessory Clinical Findings:     Lab Results   Component Value Date    CHOLSTRLTOT 292 (H) 03/28/2022     (H) 03/28/2022    HDL 40 03/28/2022    TRIGLYCERIDE 362 (H) 03/28/2022      No results found for: LIPOPROTA   No results found for: APOB         Lab Results   Component Value Date    HBA1C 4.7 03/28/2022      Lab Results   Component Value Date    SODIUM 138 12/22/2022    POTASSIUM 3.3 (L) 12/22/2022    CHLORIDE 103 12/22/2022    CO2 25 12/22/2022    GLUCOSE 96 12/22/2022    BUN 10 12/22/2022    CREATININE 0.72 12/22/2022             No results found for: MICROALBCALC, MALBCRT, MALBEXCR, KNUMOM00, MICROALBUR, MICRALB, UMICROALBUM, MICROALBTIM   VASCULAR IMAGING:     Results for orders placed or performed during the hospital encounter of 10/06/22   EKG (NOW)   Result Value Ref Range    Report       [facility] Emergency [facility]    Test Date:  2022-10-06  Pt Name:    TOMASZ ELI                  Department: ER  MRN:        [id number]                       Room:  Gender:     Male                         Technician: [id number]  :        1990                   Requested By:ER TRIAGE PROTOCOL  Order #:    [id number]                    Reading MD: Selwyn Roberson MD    Measurements  Intervals                                Axis  Rate:       101                          P:          36  NJ:         158                          QRS:        80  QRSD:       92                           T:          4  QT:         344  QTc:        446    Interpretive Statements  Sinus tachycardia  Compared to ECG 2020 19:44:52  Sinus rhythm no longer present  Inferior Q waves no longer present  Q waves no longer present  Electronically Signed On 10-6-2022 14:51:03 PDT by Selwyn Roberson MD       Echo   Normal left ventricular systolic function.   Left ventricular ejection fraction is 60% to 65%.   No significant valve abnormalities.     CT abd   CT Abdomen:  The liver, spleen, adrenal glands, pancreas, kidneys, and gallbladder are unremarkable.                 MEDICAL DECISION-MAKING:  TODAY'S ASSESSMENT / STATUS / PLAN:  1. Resistant hypertension  ALDOSTERONE    CBC WITH DIFFERENTIAL    Comp Metabolic Panel    CORTISOL    METANEPHRINES PLASMA    MICROALBUMIN CREAT RATIO URINE    RENIN ACTIVITY    TSH WITH REFLEX TO FT4    US-RENAL ARTERY DUPLEX COMP    APOLIPOPROTEIN B    LDL, DIRECT    Lipid Profile    Lipoprotein (a)    CRP HIGH SENSITIVE (CARDIAC)    doxazosin (CARDURA) 1 MG Tab    telmisartan (MICARDIS) 80 MG Tab    OVERNIGHT PULSE OXIMETRY      2. Class 1 obesity due to excess calories with serious comorbidity and body mass index (BMI) of 30.0 to 30.9 in adult        3. Mixed hyperlipidemia  APOLIPOPROTEIN B    LDL, DIRECT    Lipid Profile    Lipoprotein (a)      4. Metabolic syndrome        5. Snoring  OVERNIGHT PULSE OXIMETRY           Patient Type: Primary Prevention    1. BLOOD PRESSURE CONTROL   Qualifies as Resistant HTN (RH):  Yes, Uncontrolled RH - BP above goal  despite concurrent use of 3 max-tolerated meds from 3 classes   Office BP Goal ACC/AHA (2017) goal <130/80  Home BP at goal:  no  Office BP at goal:  no  24h ABPM:  not ordered to date   Device candidate? Not available at this time   Contributing factors: obesity, young age of onset, fhx of HTN, MetS   Plan:   Monitoring:   - start/continue home BP monitoring, reviewed correct technique:  Yes   - order 24h ABPM: NO  - monitor lytes/gfr routinely   - advised that stabilizing BP may require multiple med changes and close monitoring over the next several months, reviewed that initially there will be additional imaging and labs and the possibility of adverse drug rxn's and variability of his BP and HR until we have things stabilized.  Advised to contact our office as needed for questions or concerns.      2. WORK UP FOR SECONDARY CAUSES OF RH:  Obstructive Sleep Apnea:  reports heavy snoring, no apnea, neg PSG in the past.  Wife reports somewhat worse   - check OPO and consider further testing   Renal parenchymal disease: Excluded  Renovascular HTN: Not Yet Assessed  Primary Aldosteronism:  stop thiazide, start doxazosin, then check ARR in 3 weeks to improve accuracy   Thyroid Function: Excluded  Pheochromocytoma: Not Yet Assessed   Cushing's:   Not Yet Assessed   Coarctation of Aorta: excluded  Other: none identified    Recommendations At This Visit: as noted in orders         3. MEDICATION USE / ADHERENCE:   Assessment: Complete  Recommendations: Instructed to Continue Taking All Medications As Prescribed         4. HTN-MEDIATED END-ORGAN DAMAGE:  Left Ventricular Hypertrophy: Absent on  ECG Date: 10/2022  Urine Albuminuria: Not Yet Assessed on Date: pending   Renal Function: G1-2  Ophthalmologic: Absent per patient report and/or eye specialist   Established Cardiovascular Disease: None    5. LIFESTYLE INTERVENTIONS:  (very important focus)     SMOKING:    reports that he has never smoked. He has never used smokeless  tobacco.   - continued complete avoidance of all tobacco products     PHYSICAL ACTIVITY: continue healthy activity to improve CV fitness.  In general, targeting >150min/week of moderate-level activity or as much as tolerated in light of functional status and co-morbidities     WEIGHT MANAGEMENT AND NUTRITION: Mediterranean style dietary approach , DASH style dietary approach , Focus on reduced sodium to <2,000mg per day , and Provide specific dietary approach handouts     6. STANDARD HTN PHARMACOTHERAPY:  - stop losartan as ineffective, start telmisartan 80mg daily  for improved BP control potency and true 24h BP coverage   - stop HCTZ for now to allow K+ to return to normal and check accurate zara/renin ratio, may restart in future along with spironolactone   - continue amlodipine 10mg daily     7. ADDITIONAL AGENTS   - add spironolactone as next agent   - start doxazosin 1mg QHS     LIPID MANAGEMENT:   Qualifies for Statin Therapy Based on 2018 ACC/AHA Guidelines: no, Primary Prevention- 20-38yo  The ASCVD Risk score (Phyllis KEVIN, et al., 2019) failed to calculate., N/A  Major ASCVD events: None    High-risk conditions: N/A  Risk-enhancers: Persistently elevated LDL-C >159  Currently on Statin: No  Tx goals: reduce LDL-C 30-49% and LDL-C <100 (consider non-HDL-C <130, apoB <90)  At goal? No, 2/2022  Plan:   - reinforced ongoing TLC measures as noted   - monitor labs   Meds:  none at this time     GLYCEMIA MANAGEMENT:  Normal  # Metabolic syndrome - 4/5 components   - higher components equates to higher system-wide inflammation and ASCVD and T2D risk   - estimated 2-fold increase ASCVD events for both primary/secondary prevention   - estimated 4-6-fold increase in development of full-blown T2D  Plan:  - focus on healthy macromolecules choices and overall reduced kcal diet, such as Med diet approach  - focus on body weight reduction of 5-7% as weight loss goal      ANTIPLATELET/ANTICOAGULANT THERAPY:  not indicated      OTHER ISSUES:     # OPAL - stable, on trintellix  Unclear if this is best med for his tx based upon current sx and HTN     Studies Ordered At This Visit: FACUNDO duplex    Blood Work to Be Obtained Prior to Next Visit: as noted below   Disposition: RTC in 6 weeks      Darryl Perez M.D.  [facility]   [facility]   [phone]

## 2023-01-10 DIAGNOSIS — I1A.0 RESISTANT HYPERTENSION: ICD-10-CM

## 2023-01-10 RX ORDER — DOXAZOSIN MESYLATE 1 MG/1
1 TABLET ORAL
Qty: 90 TABLET | Refills: 3 | Status: SHIPPED | OUTPATIENT
Start: 2023-01-10 | End: 2023-02-10

## 2023-01-10 NOTE — PROGRESS NOTES
[facility] per pt request due to shortage of medication at another pharmacy.    Joi JANG  [facility]

## 2023-02-03 ENCOUNTER — HOSPITAL ENCOUNTER (OUTPATIENT)
Dept: RADIOLOGY | Facility: MEDICAL CENTER | Age: 33
End: 2023-02-03
Attending: FAMILY MEDICINE
Payer: COMMERCIAL

## 2023-02-03 ENCOUNTER — HOSPITAL ENCOUNTER (OUTPATIENT)
Dept: LAB | Facility: MEDICAL CENTER | Age: 33
End: 2023-02-03
Attending: FAMILY MEDICINE
Payer: COMMERCIAL

## 2023-02-03 DIAGNOSIS — I1A.0 RESISTANT HYPERTENSION: ICD-10-CM

## 2023-02-03 DIAGNOSIS — E78.2 MIXED HYPERLIPIDEMIA: ICD-10-CM

## 2023-02-03 LAB
ALBUMIN SERPL BCP-MCNC: 5 G/DL (ref 3.2–4.9)
ALBUMIN/GLOB SERPL: 1.5 G/DL
ALP SERPL-CCNC: 95 U/L (ref 30–99)
ALT SERPL-CCNC: 38 U/L (ref 2–50)
ANION GAP SERPL CALC-SCNC: 14 MMOL/L (ref 7–16)
AST SERPL-CCNC: 20 U/L (ref 12–45)
BASOPHILS # BLD AUTO: 0.8 % (ref 0–1.8)
BASOPHILS # BLD: 0.04 K/UL (ref 0–0.12)
BILIRUB SERPL-MCNC: 0.5 MG/DL (ref 0.1–1.5)
BUN SERPL-MCNC: 10 MG/DL (ref 8–22)
CALCIUM ALBUM COR SERPL-MCNC: 8.7 MG/DL (ref 8.5–10.5)
CALCIUM SERPL-MCNC: 9.5 MG/DL (ref 8.5–10.5)
CHLORIDE SERPL-SCNC: 102 MMOL/L (ref 96–112)
CHOLEST SERPL-MCNC: 293 MG/DL (ref 100–199)
CO2 SERPL-SCNC: 22 MMOL/L (ref 20–33)
CORTIS SERPL-MCNC: 6.3 UG/DL (ref 0–23)
CREAT SERPL-MCNC: 0.65 MG/DL (ref 0.5–1.4)
CREAT UR-MCNC: 52.36 MG/DL
CRP SERPL HS-MCNC: 3.1 MG/L (ref 0–3)
EOSINOPHIL # BLD AUTO: 0.05 K/UL (ref 0–0.51)
EOSINOPHIL NFR BLD: 1 % (ref 0–6.9)
ERYTHROCYTE [DISTWIDTH] IN BLOOD BY AUTOMATED COUNT: 41.2 FL (ref 35.9–50)
GFR SERPLBLD CREATININE-BSD FMLA CKD-EPI: 129 ML/MIN/1.73 M 2
GLOBULIN SER CALC-MCNC: 3.3 G/DL (ref 1.9–3.5)
GLUCOSE SERPL-MCNC: 100 MG/DL (ref 65–99)
HCT VFR BLD AUTO: 47.3 % (ref 42–52)
HDLC SERPL-MCNC: 44 MG/DL
HGB BLD-MCNC: 16.9 G/DL (ref 14–18)
IMM GRANULOCYTES # BLD AUTO: 0.03 K/UL (ref 0–0.11)
IMM GRANULOCYTES NFR BLD AUTO: 0.6 % (ref 0–0.9)
LDLC SERPL CALC-MCNC: 187 MG/DL
LYMPHOCYTES # BLD AUTO: 1.28 K/UL (ref 1–4.8)
LYMPHOCYTES NFR BLD: 24.8 % (ref 22–41)
MCH RBC QN AUTO: 31.7 PG (ref 27–33)
MCHC RBC AUTO-ENTMCNC: 35.7 G/DL (ref 33.7–35.3)
MCV RBC AUTO: 88.7 FL (ref 81.4–97.8)
MICROALBUMIN UR-MCNC: <1.2 MG/DL
MICROALBUMIN/CREAT UR: NORMAL MG/G (ref 0–30)
MONOCYTES # BLD AUTO: 0.34 K/UL (ref 0–0.85)
MONOCYTES NFR BLD AUTO: 6.6 % (ref 0–13.4)
NEUTROPHILS # BLD AUTO: 3.43 K/UL (ref 1.82–7.42)
NEUTROPHILS NFR BLD: 66.2 % (ref 44–72)
NRBC # BLD AUTO: 0 K/UL
NRBC BLD-RTO: 0 /100 WBC
PLATELET # BLD AUTO: 295 K/UL (ref 164–446)
PMV BLD AUTO: 10.2 FL (ref 9–12.9)
POTASSIUM SERPL-SCNC: 4 MMOL/L (ref 3.6–5.5)
PROT SERPL-MCNC: 8.3 G/DL (ref 6–8.2)
RBC # BLD AUTO: 5.33 M/UL (ref 4.7–6.1)
SODIUM SERPL-SCNC: 138 MMOL/L (ref 135–145)
TRIGL SERPL-MCNC: 310 MG/DL (ref 0–149)
TSH SERPL DL<=0.005 MIU/L-ACNC: 2.16 UIU/ML (ref 0.38–5.33)
WBC # BLD AUTO: 5.2 K/UL (ref 4.8–10.8)

## 2023-02-03 PROCEDURE — 82043 UR ALBUMIN QUANTITATIVE: CPT

## 2023-02-03 PROCEDURE — 83835 ASSAY OF METANEPHRINES: CPT

## 2023-02-03 PROCEDURE — 82172 ASSAY OF APOLIPOPROTEIN: CPT

## 2023-02-03 PROCEDURE — 80061 LIPID PANEL: CPT

## 2023-02-03 PROCEDURE — 82533 TOTAL CORTISOL: CPT

## 2023-02-03 PROCEDURE — 82088 ASSAY OF ALDOSTERONE: CPT

## 2023-02-03 PROCEDURE — 84443 ASSAY THYROID STIM HORMONE: CPT

## 2023-02-03 PROCEDURE — 80053 COMPREHEN METABOLIC PANEL: CPT

## 2023-02-03 PROCEDURE — 36415 COLL VENOUS BLD VENIPUNCTURE: CPT

## 2023-02-03 PROCEDURE — 86141 C-REACTIVE PROTEIN HS: CPT

## 2023-02-03 PROCEDURE — 93975 VASCULAR STUDY: CPT

## 2023-02-03 PROCEDURE — 83695 ASSAY OF LIPOPROTEIN(A): CPT

## 2023-02-03 PROCEDURE — 82570 ASSAY OF URINE CREATININE: CPT

## 2023-02-03 PROCEDURE — 85025 COMPLETE CBC W/AUTO DIFF WBC: CPT

## 2023-02-03 PROCEDURE — 84244 ASSAY OF RENIN: CPT

## 2023-02-05 LAB
APO B100 SERPL-MCNC: 169 MG/DL (ref 55–140)
LDLC SERPL-MCNC: 199 MG/DL (ref 0–129)
LPA SERPL-MCNC: <6 MG/DL

## 2023-02-06 LAB — ALDOST SERPL-MCNC: 11.1 NG/DL

## 2023-02-07 LAB
METANEPHS SERPL-SCNC: 0.15 NMOL/L (ref 0–0.49)
NORMETANEPHRINE SERPL-SCNC: 0.49 NMOL/L (ref 0–0.89)
RENIN PLAS-CCNC: 0.1 NG/ML/HR

## 2023-02-10 ENCOUNTER — OFFICE VISIT (OUTPATIENT)
Dept: VASCULAR LAB | Facility: MEDICAL CENTER | Age: 33
End: 2023-02-10
Attending: FAMILY MEDICINE
Payer: COMMERCIAL

## 2023-02-10 VITALS
SYSTOLIC BLOOD PRESSURE: 133 MMHG | HEART RATE: 80 BPM | BODY MASS INDEX: 30.07 KG/M2 | DIASTOLIC BLOOD PRESSURE: 81 MMHG | HEIGHT: 69 IN | WEIGHT: 203 LBS

## 2023-02-10 DIAGNOSIS — E88.810 METABOLIC SYNDROME: ICD-10-CM

## 2023-02-10 DIAGNOSIS — E78.2 MIXED HYPERLIPIDEMIA: ICD-10-CM

## 2023-02-10 DIAGNOSIS — I1A.0 RESISTANT HYPERTENSION: ICD-10-CM

## 2023-02-10 DIAGNOSIS — F10.10 EXCESSIVE DRINKING ALCOHOL: ICD-10-CM

## 2023-02-10 DIAGNOSIS — F41.1 GENERALIZED ANXIETY DISORDER: ICD-10-CM

## 2023-02-10 PROCEDURE — 99214 OFFICE O/P EST MOD 30 MIN: CPT | Performed by: FAMILY MEDICINE

## 2023-02-10 PROCEDURE — 99212 OFFICE O/P EST SF 10 MIN: CPT

## 2023-02-10 RX ORDER — DOXAZOSIN 2 MG/1
2 TABLET ORAL DAILY
Qty: 90 TABLET | Refills: 3 | Status: SHIPPED
Start: 2023-02-10 | End: 2023-04-13

## 2023-02-10 ASSESSMENT — ENCOUNTER SYMPTOMS
HEMOPTYSIS: 0
SPUTUM PRODUCTION: 0
FEVER: 0
WHEEZING: 0
CHILLS: 0
COUGH: 0
SHORTNESS OF BREATH: 0
PALPITATIONS: 0
CLAUDICATION: 0
ORTHOPNEA: 0
PND: 0

## 2023-02-10 ASSESSMENT — FIBROSIS 4 INDEX: FIB4 SCORE: 0.34

## 2023-02-10 NOTE — PATIENT INSTRUCTIONS
INCREASE DOXAZOSIN TO 2MG AT BEDTIME  IF STILL >130/80 AFTER 4 WEEKS, THEN INCREASE TO 4MG (2 TABLETS)         Helpful links:     Https://www.nhlbi.nih.gov/files/docs/public/heart/dash_brief.pdf    Https://www.nhlbi.nih.gov/files/docs/public/heart/new_dash.pdf    http://www.[employer]/consumers/ho/nut.dash.diet.pdf    Free 7-day menu download: https://[employer]/DASH-Diet-Meal-Plan-Menu/dp/R76E2Q07AM    SODIUM RESTRICTIONS:   - consume no more than 2,300mg of sodium daily (look at food labels) ,or if you have high BP then reduce to no more than 1,500mg of sodium daily   For more information visit: https://www.[employer]/contents/low-sodium-diet-the-basics/print?lqrmoHpx=2183&source=see_link

## 2023-03-24 DIAGNOSIS — I10 ESSENTIAL HYPERTENSION: ICD-10-CM

## 2023-03-24 RX ORDER — AMLODIPINE BESYLATE 10 MG/1
10 TABLET ORAL DAILY
Qty: 90 TABLET | Refills: 0 | Status: SHIPPED | OUTPATIENT
Start: 2023-03-24 | End: 2023-06-26

## 2023-04-13 ENCOUNTER — DOCUMENTATION (OUTPATIENT)
Dept: VASCULAR LAB | Facility: MEDICAL CENTER | Age: 33
End: 2023-04-13

## 2023-04-13 ENCOUNTER — OFFICE VISIT (OUTPATIENT)
Dept: VASCULAR LAB | Facility: MEDICAL CENTER | Age: 33
End: 2023-04-13
Attending: NURSE PRACTITIONER
Payer: COMMERCIAL

## 2023-04-13 ENCOUNTER — APPOINTMENT (OUTPATIENT)
Dept: VASCULAR LAB | Facility: MEDICAL CENTER | Age: 33
End: 2023-04-13
Payer: COMMERCIAL

## 2023-04-13 VITALS
BODY MASS INDEX: 30.36 KG/M2 | DIASTOLIC BLOOD PRESSURE: 82 MMHG | WEIGHT: 205 LBS | HEART RATE: 85 BPM | SYSTOLIC BLOOD PRESSURE: 126 MMHG | HEIGHT: 69 IN

## 2023-04-13 DIAGNOSIS — I1A.0 RESISTANT HYPERTENSION: ICD-10-CM

## 2023-04-13 DIAGNOSIS — R07.9 CHEST PAIN, UNSPECIFIED TYPE: ICD-10-CM

## 2023-04-13 PROCEDURE — 99212 OFFICE O/P EST SF 10 MIN: CPT

## 2023-04-13 PROCEDURE — 99214 OFFICE O/P EST MOD 30 MIN: CPT | Performed by: NURSE PRACTITIONER

## 2023-04-13 RX ORDER — DOXAZOSIN MESYLATE 4 MG/1
4 TABLET ORAL NIGHTLY
Qty: 90 TABLET | Refills: 3 | Status: SHIPPED | OUTPATIENT
Start: 2023-04-13

## 2023-04-13 ASSESSMENT — ENCOUNTER SYMPTOMS
FOCAL WEAKNESS: 0
WEIGHT LOSS: 0
SHORTNESS OF BREATH: 0
WHEEZING: 0
NERVOUS/ANXIOUS: 1

## 2023-04-13 ASSESSMENT — FIBROSIS 4 INDEX: FIB4 SCORE: 0.34

## 2023-04-13 NOTE — PROGRESS NOTES
RESISTANT HTN CLINIC - FOLLOW-UP   23   Geremias Conte is a male seen for evaluation of resistant HTN and management.   Geremias Conte is initially referred by Teodoro Sellers P.A.-C    Subjective    HTN:  Current/interval HTN concerns:  feeling well, still feels that anxiety is stabilizing. Is taking 4mg doxazosin; increased when SBP was still >130  Home BP lo/80s although not regularly testing  Adherence to current HTN meds: compliant all of the time   Lifestyle factors:  Weight Change: stable   BMI Readings from Last 3 Encounters:   23 30.27 kg/m²   02/10/23 29.98 kg/m²   23 30.27 kg/m²      Diet pattern: common adult  Daily salt intake estimate:  High     EtOH: Yes, Details: heavier use,  3+ beers/day   Exercise habits: no regular exercise program  Perceived barriers to care: none   Pertinent HTN hx (reviewed at initial visit):   Age at HTN dx: more than >3mo ago noted very elevated 190s/?  Past HTN medications: lisinopril   HTN crises:  No prior hospitalization or crises   Interfering substances/contributing factors:  None    Hyperlipidemia:  Stable, no current concerns  Current treatment: lifestyle changes  and no current medications   Latest Reference Range & Units Most Recent   Cholesterol,Tot 100 - 199 mg/dL 292 (H)  3/28/22 10:09   Triglycerides 0 - 149 mg/dL 362 (H)  3/28/22 10:09   HDL >=40 mg/dL 40  3/28/22 10:09   LDL <100 mg/dL 180 (H)  3/28/22 10:09        Sleeping disorder/ANN: Yes, Details: heavy snoring, had PSG in past few years ago and told no sleep apena   Has order for OPO but not yet completed     Current Outpatient Medications:     doxazosin, 4 mg, Oral, Nightly    amLODIPine, 10 mg, Oral, DAILY, Taking    sertraline, , Taking    telmisartan, 80 mg, Oral, QHS, Taking    sertraline, 25 mg, Oral, DAILY, Taking    ALPRAZolam, 0.5 mg, Oral, HS PRN, PRN     Social History     Tobacco Use    Smoking status: Never    Smokeless tobacco: Never   Vaping Use    Vaping Use: Never  "used   Substance Use Topics    Alcohol use: Yes     Alcohol/week: 7.2 oz     Types: 12 Cans of beer per week     Comment: mostly weekends (5-20beers; 12pack on avg)    Drug use: No      Review of Systems   Constitutional:  Negative for malaise/fatigue and weight loss.   Respiratory:  Negative for shortness of breath and wheezing.    Cardiovascular:  Negative for chest pain and leg swelling.   Neurological:  Negative for focal weakness.   Psychiatric/Behavioral:  The patient is nervous/anxious.          Objective    Vitals:    04/13/23 1454   BP: 126/82   BP Location: Left arm   Patient Position: Sitting   BP Cuff Size: Adult   Pulse: 85   Weight: 93 kg (205 lb)   Height: 1.753 m (5' 9\")     Body mass index is 30.27 kg/m².  BP Readings from Last 5 Encounters:   04/13/23 126/82   02/10/23 133/81   01/06/23 (!) 149/91   12/09/22 120/78   11/11/22 (!) 140/98      Physical Exam  Constitutional:       General: He is not in acute distress.     Appearance: He is not diaphoretic.   Cardiovascular:      Rate and Rhythm: Normal rate and regular rhythm.      Heart sounds: Normal heart sounds. No murmur heard.  Pulmonary:      Effort: Pulmonary effort is normal. No respiratory distress.      Breath sounds: Normal breath sounds. No wheezing.   Musculoskeletal:         General: No swelling. Normal range of motion.   Neurological:      Mental Status: He is alert and oriented to person, place, and time.   Psychiatric:         Mood and Affect: Mood normal.         Behavior: Behavior normal.      Accessory Clinical Findings:     Lab Results   Component Value Date    CHOLSTRLTOT 293 (H) 02/03/2023     (H) 02/03/2023    HDL 44 02/03/2023    TRIGLYCERIDE 310 (H) 02/03/2023      Lab Results   Component Value Date/Time    LIPOPROTA <6 02/03/2023 09:32 AM      Lab Results   Component Value Date/Time    APOB 169 (H) 02/03/2023 09:32 AM            Lab Results   Component Value Date    HBA1C 4.7 03/28/2022      Lab Results   Component " Value Date    SODIUM 138 02/03/2023    POTASSIUM 4.0 02/03/2023    CHLORIDE 102 02/03/2023    CO2 22 02/03/2023    GLUCOSE 100 (H) 02/03/2023    BUN 10 02/03/2023    CREATININE 0.65 02/03/2023      Lab Results   Component Value Date    ALDOSTERONE 11.1 02/03/2023       Latest Reference Range & Units 02/03/23 09:29 02/03/23 09:31 02/03/23 09:32   Aldos Serum ng/dL   11.1   Renin Activity ng/mL/hr  0.1    Metanephrine Plasma 0.00 - 0.49 nmol/L 0.15     Normetanephrine Plasma 0.00 - 0.89 nmol/L 0.49        Latest Reference Range & Units 02/03/23 09:32   Cortisol 0.0 - 23.0 ug/dL 6.3     Lab Results   Component Value Date    URCREAT 52.36 02/03/2023    MICROALBUR <1.2 02/03/2023    MALBCRT see below 02/03/2023    METANEPHPL 0.15 02/03/2023     Lab Results   Component Value Date/Time    MALBCRT see below 02/03/2023 09:30 AM    MICROALBUR <1.2 02/03/2023 09:30 AM      VASCULAR IMAGING:     Echo 2014  Normal left ventricular systolic function.   Left ventricular ejection fraction is 60% to 65%.   No significant valve abnormalities.     CT abd 2016  CT Abdomen:  The liver, spleen, adrenal glands, pancreas, kidneys, and gallbladder are unremarkable.     RAD 2/3/23  .  There is no evidence of renal artery stenosis.          MEDICAL DECISION-MAKING:  TODAY'S ASSESSMENT / STATUS / PLAN:  1. Resistant hypertension  EC-ECHOCARDIOGRAM COMPLETE W/O CONT    RENIN PLASMA    ALDOSTERONE    doxazosin (CARDURA) 4 MG Tab      2. Chest pain, unspecified type  EC-ECHOCARDIOGRAM COMPLETE W/O CONT           Patient Type: Primary Prevention    1. BLOOD PRESSURE CONTROL   Qualifies as Resistant HTN (RH):  Yes, Uncontrolled RH - BP above goal despite concurrent use of 3 max-tolerated meds from 3 classes   Office BP Goal ACC/AHA (2017) goal <130/80  Home BP at goal:  no  Office BP at goal:  no; continued mildly elevated diastolic  24h ABPM:  not ordered to date   Device candidate? Not available at this time   Contributing factors: obesity, young  age of onset, fhx of HTN, MetS   Plan:   Monitoring:   - continue home BP monitoring  - monitor lytes/gfr routinely     2. WORK UP FOR SECONDARY CAUSES OF RH:  Obstructive Sleep Apnea:  reports heavy snoring, no apnea, neg PSG in the past.  Wife reports somewhat worse   - OPO ordered, not complete to date.  Will send Mychart msg with contact info.    Renal parenchymal disease: Excluded  Renovascular HTN: Excluded  Primary Aldosteronism:  reordered today, after med changes at last visit   Thyroid Function: Excluded  Pheochromocytoma: Excluded   Cushing's:   Excluded   Other: none identified    Recommendations At This Visit: as noted in orders         3. MEDICATION USE / ADHERENCE:   Assessment: Complete  Recommendations: Instructed to Continue Taking All Medications As Prescribed         4. HTN-MEDIATED END-ORGAN DAMAGE:  Left Ventricular Hypertrophy: Absent on  ECG Date: 10/2022  Urine Albuminuria: None on Date: 2023  Renal Function: G1-2  Ophthalmologic: Absent per patient report and/or eye specialist   Established Cardiovascular Disease: None    5. LIFESTYLE INTERVENTIONS:  (very important focus)     SMOKING:    reports that he has never smoked. He has never used smokeless tobacco.   - continued complete avoidance of all tobacco products     PHYSICAL ACTIVITY: continue healthy activity to improve CV fitness.  In general, targeting >150min/week of moderate-level activity or as much as tolerated    WEIGHT MANAGEMENT AND NUTRITION: Mediterranean style dietary approach , DASH style dietary approach , Focus on reduced sodium to <2,000mg per day , and Provide specific dietary approach handouts   - reduce etoh to 2 standard beers per day   - needs overall wt loss with a goal of 10lbs over the next 6mo    6. STANDARD HTN PHARMACOTHERAPY:  - continue telmisartan 80mg daily  for improved BP control potency and true 24h BP coverage   - continue amlodipine 10mg daily     7. ADDITIONAL AGENTS   - add spironolactone as next  agent   - continue doxazosin 4mg qhs    LIPID MANAGEMENT:   Qualifies for Statin Therapy Based on 2018 ACC/AHA Guidelines: no, Primary Prevention- 20-40yo  The ASCVD Risk score (Phyllis DK, et al., 2019) failed to calculate., N/A  Major ASCVD events: None    High-risk conditions: N/A  Risk-enhancers: Persistently elevated LDL-C >159, Persistently elevated trigs >174, and apoB >129 = severe primary HLD  Currently on Statin: No  Tx goals: reduce LDL-C 30-49% and LDL-C <100 (consider non-HDL-C <130, apoB <90)  At goal? No, 2/2022  Plan:   - reinforced ongoing TLC measures as noted   - monitor labs   Meds:  none at this time     GLYCEMIA MANAGEMENT:  Normal  # Metabolic syndrome - 4/5 components   - higher components equates to higher system-wide inflammation and ASCVD and T2D risk   - estimated 2-fold increase ASCVD events for both primary/secondary prevention   - estimated 4-6-fold increase in development of full-blown T2D  Plan:  - focus on healthy macromolecules choices and overall reduced kcal diet, such as Med diet approach  - focus on body weight reduction of 5-7% as weight loss goal      ANTIPLATELET/ANTICOAGULANT THERAPY:  not indicated     OTHER ISSUES:     1) OPAL - a barrier for HTN, certainly.  He feels this is stabilizing but he does recall a lot of anxiety when he takes his actual BP.  We discussed not worrying about the values, but just recording them for our review.  - continue current meds and f/u with PCP    2) chest discomfort and HTN- recheck echo; last done 2014.  He denies accompanying symptoms like SOB, lightheaded or dizziness, syncope, or diaphoresis.  -to ER if worsening or changed symptoms      Studies Ordered At This Visit: OPO (as previously ordered) and echo   Blood Work to Be Obtained Prior to Next Visit: renin/aldos  Disposition: RTC in 6wks with home BP log to check diastolic      LUIZ Espinosa  [facility]   [facility]    [phone]

## 2023-04-13 NOTE — PROGRESS NOTES
OPO order sent to [facility] j[facility]#[phone] Fax#[phone]    Confirmation received   Scanned to pt chart

## 2023-06-01 ENCOUNTER — HOSPITAL ENCOUNTER (OUTPATIENT)
Dept: CARDIOLOGY | Facility: MEDICAL CENTER | Age: 33
End: 2023-06-01
Attending: NURSE PRACTITIONER
Payer: COMMERCIAL

## 2023-06-01 ENCOUNTER — HOSPITAL ENCOUNTER (OUTPATIENT)
Dept: LAB | Facility: MEDICAL CENTER | Age: 33
End: 2023-06-01
Attending: NURSE PRACTITIONER
Payer: COMMERCIAL

## 2023-06-01 DIAGNOSIS — R07.9 CHEST PAIN, UNSPECIFIED TYPE: ICD-10-CM

## 2023-06-01 DIAGNOSIS — I1A.0 RESISTANT HYPERTENSION: ICD-10-CM

## 2023-06-01 LAB
LV EJECT FRACT  99904: 65
LV EJECT FRACT MOD 2C 99903: 63.5
LV EJECT FRACT MOD 4C 99902: 68.31
LV EJECT FRACT MOD BP 99901: 67.46

## 2023-06-01 PROCEDURE — 36415 COLL VENOUS BLD VENIPUNCTURE: CPT

## 2023-06-01 PROCEDURE — 93306 TTE W/DOPPLER COMPLETE: CPT | Mod: 26 | Performed by: INTERNAL MEDICINE

## 2023-06-01 PROCEDURE — 93306 TTE W/DOPPLER COMPLETE: CPT

## 2023-06-01 PROCEDURE — 82088 ASSAY OF ALDOSTERONE: CPT

## 2023-06-01 PROCEDURE — 84244 ASSAY OF RENIN: CPT

## 2023-06-02 ENCOUNTER — DOCUMENTATION (OUTPATIENT)
Dept: VASCULAR LAB | Facility: MEDICAL CENTER | Age: 33
End: 2023-06-02
Payer: COMMERCIAL

## 2023-06-02 NOTE — PROGRESS NOTES
Left message for patient to let him grzegorzw, Dr. Bloch reviewed his echo and everything looks fine. Also let patient know he is overdue for follow up and to contact our office to schedule appt. Direct clinic line left on vm.

## 2023-06-03 LAB — ALDOST SERPL-MCNC: 16.6 NG/DL

## 2023-06-04 LAB — RENIN PLAS-CCNC: 1.3 NG/ML/HR

## 2023-06-06 ENCOUNTER — DOCUMENTATION (OUTPATIENT)
Dept: VASCULAR LAB | Facility: MEDICAL CENTER | Age: 33
End: 2023-06-06
Payer: COMMERCIAL

## 2023-06-09 ENCOUNTER — DOCUMENTATION (OUTPATIENT)
Dept: VASCULAR LAB | Facility: MEDICAL CENTER | Age: 33
End: 2023-06-09
Payer: COMMERCIAL

## 2023-06-25 DIAGNOSIS — I10 ESSENTIAL HYPERTENSION: ICD-10-CM

## 2023-06-26 RX ORDER — AMLODIPINE BESYLATE 10 MG/1
TABLET ORAL
Qty: 90 TABLET | Refills: 0 | Status: SHIPPED | OUTPATIENT
Start: 2023-06-26 | End: 2023-09-23 | Stop reason: SDUPTHER

## 2023-07-23 ENCOUNTER — OFFICE VISIT (OUTPATIENT)
Dept: URGENT CARE | Facility: PHYSICIAN GROUP | Age: 33
End: 2023-07-23
Payer: COMMERCIAL

## 2023-07-23 VITALS
HEART RATE: 118 BPM | TEMPERATURE: 97.8 F | HEIGHT: 69 IN | OXYGEN SATURATION: 99 % | BODY MASS INDEX: 29.77 KG/M2 | DIASTOLIC BLOOD PRESSURE: 102 MMHG | SYSTOLIC BLOOD PRESSURE: 168 MMHG | RESPIRATION RATE: 18 BRPM | WEIGHT: 201 LBS

## 2023-07-23 DIAGNOSIS — R00.0 TACHYCARDIA: ICD-10-CM

## 2023-07-23 DIAGNOSIS — R07.9 CHEST PAIN, UNSPECIFIED TYPE: ICD-10-CM

## 2023-07-23 DIAGNOSIS — R03.0 ELEVATED BLOOD PRESSURE READING: ICD-10-CM

## 2023-07-23 PROCEDURE — 93000 ELECTROCARDIOGRAM COMPLETE: CPT | Performed by: FAMILY MEDICINE

## 2023-07-23 PROCEDURE — 3080F DIAST BP >= 90 MM HG: CPT | Performed by: FAMILY MEDICINE

## 2023-07-23 PROCEDURE — 3077F SYST BP >= 140 MM HG: CPT | Performed by: FAMILY MEDICINE

## 2023-07-23 PROCEDURE — 99215 OFFICE O/P EST HI 40 MIN: CPT | Performed by: FAMILY MEDICINE

## 2023-07-23 RX ORDER — ASPIRIN 81 MG/1
324 TABLET, CHEWABLE ORAL ONCE
Status: COMPLETED | OUTPATIENT
Start: 2023-07-23 | End: 2023-07-23

## 2023-07-23 RX ADMIN — ASPIRIN 324 MG: 81 TABLET, CHEWABLE ORAL at 13:57

## 2023-07-23 ASSESSMENT — FIBROSIS 4 INDEX: FIB4 SCORE: 0.34

## 2023-07-23 NOTE — PROGRESS NOTES
"  Subjective:      32 y.o. male presents to urgent care for chest pain and palpitations that started on Friday. There was no inciting event or trauma at that time. The pain is intermittent, he is unsure of alleviating or aggravating factors, does not seem to be activity depending, it's described as a stabbing sensation, currently rated 3/10. He has not yet taken anything for the pain. There is associated shortness of breath, nausea, and vomiting. No diaphoresis. Followed by vascular medicine for persistent hypertension for which he takes amlodipine, telmisartan, and doxazosin.    Chest pain red flags  -History of hypertension, diabetes, peripheral artery disease, hypercholesterolemia: yes  -Recent trauma, major surgery, or medical procedures: no  -Recent long periods of immobility: no  -Tobacco product use: no  -Recreational drug use such as cocaine: no  -Similar pain in the past: no  -Prior cardiac diagnostic tests (ECHO, stress test, coronary CTA): yes. Last ECHO 6/1/2023 was normal. EF 65%, no wall motion abnormality.   -Family history of cardiovascular disease: no    He denies any other questions or concerns at this time.    Current problem list, medication, and past medical/surgical history were reviewed in Epic.    ROS  See HPI     Objective:      BP (!) 168/102   Pulse (!) 118   Temp 36.6 °C (97.8 °F)   Resp 18   Ht 1.753 m (5' 9\")   Wt 91.2 kg (201 lb)   SpO2 99%   BMI 29.68 kg/m²     Physical Exam  Constitutional:       General: He is not in acute distress.     Appearance: He is not diaphoretic.   Cardiovascular:      Rate and Rhythm: Normal rate and regular rhythm.      Heart sounds: Normal heart sounds.   Pulmonary:      Effort: Pulmonary effort is normal. No respiratory distress.      Breath sounds: Normal breath sounds.   Neurological:      Mental Status: He is alert.   Psychiatric:         Mood and Affect: Affect normal.         Judgment: Judgment normal.       Assessment/Plan:     1. Chest " pain, unspecified type  2. Elevated blood pressure reading  3. Tachycardia  EKG showing a heart rate of 95 bpm, regular rhythm, no ST changes.  Despite this he is high risk given his history and abnormal vitals today.  He was given 324 mg of ASA. At this time, I feel the patient requires a higher level of care in the ED for closer monitoring, stat lab work and/or imaging for further evaluation. This has been discussed with the patient and he states agreement and understanding. The patient is stable without the need for continuous monitoring and therefore will go via private vehicle to [facility] without delay.  - EKG - Clinic Performed  - aspirin (Asa) chewable tab 324 mg      Instructed to return to Urgent Care or nearest Emergency Department if symptoms fail to improve, for any change in condition, further concerns, or new concerning symptoms. Patient states understanding of the plan of care and discharge instructions.    Mariposa Hayes M.D.

## 2023-08-03 ENCOUNTER — OFFICE VISIT (OUTPATIENT)
Dept: VASCULAR LAB | Facility: MEDICAL CENTER | Age: 33
End: 2023-08-03
Attending: FAMILY MEDICINE
Payer: COMMERCIAL

## 2023-08-03 VITALS
BODY MASS INDEX: 30.51 KG/M2 | DIASTOLIC BLOOD PRESSURE: 81 MMHG | SYSTOLIC BLOOD PRESSURE: 127 MMHG | HEART RATE: 101 BPM | HEIGHT: 69 IN | WEIGHT: 206 LBS

## 2023-08-03 DIAGNOSIS — R06.83 SNORING: ICD-10-CM

## 2023-08-03 DIAGNOSIS — E78.2 MIXED HYPERLIPIDEMIA: ICD-10-CM

## 2023-08-03 DIAGNOSIS — R00.2 PALPITATIONS: ICD-10-CM

## 2023-08-03 DIAGNOSIS — E66.09 CLASS 1 OBESITY DUE TO EXCESS CALORIES WITH SERIOUS COMORBIDITY AND BODY MASS INDEX (BMI) OF 30.0 TO 30.9 IN ADULT: ICD-10-CM

## 2023-08-03 DIAGNOSIS — E88.810 METABOLIC SYNDROME: ICD-10-CM

## 2023-08-03 DIAGNOSIS — I1A.0 RESISTANT HYPERTENSION: ICD-10-CM

## 2023-08-03 DIAGNOSIS — F41.1 GENERALIZED ANXIETY DISORDER: ICD-10-CM

## 2023-08-03 PROCEDURE — 99212 OFFICE O/P EST SF 10 MIN: CPT

## 2023-08-03 PROCEDURE — 3074F SYST BP LT 130 MM HG: CPT | Performed by: FAMILY MEDICINE

## 2023-08-03 PROCEDURE — 3079F DIAST BP 80-89 MM HG: CPT | Performed by: FAMILY MEDICINE

## 2023-08-03 PROCEDURE — 99214 OFFICE O/P EST MOD 30 MIN: CPT | Performed by: FAMILY MEDICINE

## 2023-08-03 ASSESSMENT — ENCOUNTER SYMPTOMS
WEIGHT LOSS: 0
NERVOUS/ANXIOUS: 1
FOCAL WEAKNESS: 0
WHEEZING: 0
SHORTNESS OF BREATH: 0

## 2023-08-03 ASSESSMENT — FIBROSIS 4 INDEX: FIB4 SCORE: 0.34

## 2023-08-03 NOTE — PROGRESS NOTES
RESISTANT HTN CLINIC - FOLLOW-UP   23    Geremias Conte is a male seen for evaluation of resistant HTN and management.   Geremias Conte is initially referred by Teodoro Sellers P.A.-C    Subjective      Interval hx/concerns: last seen 2023, prior chest discomfort resolved, had normal echo.  No recent labs.   Reports seen at  for palpitations, seen at ED and prior normal echo and EKG.   Still with intermittent palp, no assoc CP, SOB, sweating or nausea.  Getting tx for anxiety d/o per PCP and taking sertraline.     HTN:  Home BP lo/80s - spikes to 140-160s on occassion when panicky   Adherence to current HTN meds: compliant all of the time   Lifestyle factors:  Weight Change: stable   Wt Readings from Last 3 Encounters:   23 93.4 kg (206 lb)   23 91.2 kg (201 lb)   23 93 kg (205 lb)      Diet pattern: common adult  Daily salt intake estimate:  High     EtOH: Yes, Details: heavier use,  3+ beers/day   Exercise habits: no regular exercise program  Perceived barriers to care: none   Pertinent HTN hx (reviewed at initial visit):   Age at HTN dx: more than >3mo ago noted very elevated 190s/?  Past HTN medications: lisinopril   HTN crises:  No prior hospitalization or crises   Interfering substances/contributing factors:  None    Hyperlipidemia:  Stable, no current concerns  Current treatment: lifestyle changes  and no current medications   Latest Reference Range & Units Most Recent   Cholesterol,Tot 100 - 199 mg/dL 292 (H)  3/28/22 10:09   Triglycerides 0 - 149 mg/dL 362 (H)  3/28/22 10:09   HDL >=40 mg/dL 40  3/28/22 10:09   LDL <100 mg/dL 180 (H)  3/28/22 10:09        Sleeping disorder/ANN: Yes, Details: heavy snoring, had PSG in past few years ago and told no sleep apena   Has order for OPO but not yet completed     Current Outpatient Medications:     amLODIPine, TAKE ONE TABLET BY MOUTH EVERY DAY, Taking    doxazosin, 4 mg, Oral, Nightly, Taking    sertraline, , Taking    telmisartan, 80  "mg, Oral, QHS, Taking    sertraline, 25 mg, Oral, DAILY, Taking    ALPRAZolam, 0.5 mg, Oral, HS PRN, Taking     Social History     Tobacco Use    Smoking status: Never    Smokeless tobacco: Never   Vaping Use    Vaping Use: Never used   Substance Use Topics    Alcohol use: Yes     Alcohol/week: 7.2 oz     Types: 12 Cans of beer per week     Comment: mostly weekends (5-20beers; 12pack on avg)    Drug use: No      Review of Systems   Constitutional:  Negative for malaise/fatigue and weight loss.   Respiratory:  Negative for shortness of breath and wheezing.    Cardiovascular:  Negative for chest pain and leg swelling.   Neurological:  Negative for focal weakness.   Psychiatric/Behavioral:  The patient is nervous/anxious.            Objective    Vitals:    08/03/23 1405 08/03/23 1407   BP: 135/82 127/81   BP Location: Left arm Left arm   Patient Position: Sitting Sitting   BP Cuff Size: Adult Adult   Pulse: (!) 112 (!) 101   Weight: 93.4 kg (206 lb)    Height: 1.753 m (5' 9\")      Body mass index is 30.42 kg/m².  BP Readings from Last 5 Encounters:   08/03/23 127/81   07/23/23 (!) 168/102   04/13/23 126/82   02/10/23 133/81   01/06/23 (!) 149/91      Physical Exam  Constitutional:       General: He is not in acute distress.     Appearance: He is not diaphoretic.   Cardiovascular:      Rate and Rhythm: Normal rate and regular rhythm.      Heart sounds: Normal heart sounds. No murmur heard.  Pulmonary:      Effort: Pulmonary effort is normal. No respiratory distress.      Breath sounds: Normal breath sounds. No wheezing.   Musculoskeletal:         General: No swelling. Normal range of motion.   Neurological:      Mental Status: He is alert and oriented to person, place, and time.   Psychiatric:         Mood and Affect: Mood normal.         Behavior: Behavior normal.        Accessory Clinical Findings:     Lab Results   Component Value Date    CHOLSTRLTOT 293 (H) 02/03/2023     (H) 02/03/2023    HDL 44 02/03/2023 "    TRIGLYCERIDE 310 (H) 02/03/2023      Lab Results   Component Value Date/Time    LIPOPROTA <6 02/03/2023 09:32 AM      Lab Results   Component Value Date/Time    APOB 169 (H) 02/03/2023 09:32 AM            Lab Results   Component Value Date    HBA1C 4.7 03/28/2022      Lab Results   Component Value Date    SODIUM 138 02/03/2023    POTASSIUM 4.0 02/03/2023    CHLORIDE 102 02/03/2023    CO2 22 02/03/2023    GLUCOSE 100 (H) 02/03/2023    BUN 10 02/03/2023    CREATININE 0.65 02/03/2023      Lab Results   Component Value Date    ALDOSTERONE 16.6 06/01/2023       Latest Reference Range & Units 02/03/23 09:29 02/03/23 09:31 02/03/23 09:32   Aldos Serum ng/dL   11.1   Renin Activity ng/mL/hr  0.1    Metanephrine Plasma 0.00 - 0.49 nmol/L 0.15     Normetanephrine Plasma 0.00 - 0.89 nmol/L 0.49        Latest Reference Range & Units 02/03/23 09:32   Cortisol 0.0 - 23.0 ug/dL 6.3     Lab Results   Component Value Date    URCREAT 52.36 02/03/2023    MICROALBUR <1.2 02/03/2023    MALBCRT see below 02/03/2023    METANEPHPL 0.15 02/03/2023     Lab Results   Component Value Date/Time    MALBCRT see below 02/03/2023 09:30 AM    MICROALBUR <1.2 02/03/2023 09:30 AM      VASCULAR IMAGING:     Echo 2014  Normal left ventricular systolic function.   Left ventricular ejection fraction is 60% to 65%.   No significant valve abnormalities.     CT abd 2016  CT Abdomen:  The liver, spleen, adrenal glands, pancreas, kidneys, and gallbladder are unremarkable.     Cardiac event 2017  NO SIGNIFICANT AV BLOCK   NO SINUS PAUSE   RARE PREMATURE VENTRICULAR COMPLEX   RARE PREMATURE ATRIAL COMPLEX    RAD 2/3/23  .  There is no evidence of renal artery stenosis.    Echo 6/2023  Prior echo 8/21/14  Normal transthoracic echocardiogram.           MEDICAL DECISION-MAKING:  TODAY'S ASSESSMENT / STATUS / PLAN:  1. Resistant hypertension        2. Mixed hyperlipidemia        3. Metabolic syndrome        4. OPAL (generalized anxiety disorder)        5. Class 1  obesity due to excess calories with serious comorbidity and body mass index (BMI) of 30.0 to 30.9 in adult        6. Snoring        7. Palpitations  RIH ZIO PATCH MONITOR         Patient Type: Primary Prevention    1. BLOOD PRESSURE CONTROL   Qualifies as Resistant HTN (RH):  Yes, Uncontrolled RH - BP above goal despite concurrent use of 3 max-tolerated meds from 3 classes   Office BP Goal ACC/AHA (2017) goal <130/80  Home BP at goal:  yes  Office BP at goal:  yes  24h ABPM:  not ordered to date   Device candidate? Yes   Contributing factors: obesity, young age of onset, fhx of HTN, MetS   Presumed this is paroxysmal HTN based upon hx of anxiety/panic d/o and BP spikes with anxiety, normal metaneph and thyroid   Plan:   Monitoring:   - continue home BP monitoring  - monitor lytes/gfr routinely     2. WORK UP FOR SECONDARY CAUSES OF RH:  Obstructive Sleep Apnea:  reports heavy snoring, no apnea, neg PSG in the past.  Wife reports somewhat worse   Renal parenchymal disease: Excluded  Renovascular HTN: Excluded  Primary Aldosteronism:  reordered today, after med changes at last visit   Thyroid Function: Excluded  Pheochromocytoma: Excluded   Cushing's:   Excluded   Other: none identified    Recommendations At This Visit: as noted in orders         3. MEDICATION USE / ADHERENCE:   Assessment: Complete  Recommendations: Instructed to Continue Taking All Medications As Prescribed         4. HTN-MEDIATED END-ORGAN DAMAGE:  Left Ventricular Hypertrophy: Absent on  ECG Date: 10/2022  Urine Albuminuria: None on Date: 2023  Renal Function: G1-2  Ophthalmologic: Absent per patient report and/or eye specialist   Established Cardiovascular Disease: None    5. LIFESTYLE INTERVENTIONS:  (very important focus)     SMOKING:    reports that he has never smoked. He has never used smokeless tobacco.   - continued complete avoidance of all tobacco products     PHYSICAL ACTIVITY: continue healthy activity to improve CV fitness.  In  general, targeting >150min/week of moderate-level activity or as much as tolerated    WEIGHT MANAGEMENT AND NUTRITION: Mediterranean style dietary approach , DASH style dietary approach , Focus on reduced sodium to <2,000mg per day , and Provide specific dietary approach handouts   - reduce etoh to 2 standard beers per day   - needs overall wt loss with a goal of 10lbs over the next 6mo    6. STANDARD HTN PHARMACOTHERAPY:  - continue telmisartan 80mg daily  for improved BP control potency and true 24h BP coverage   - continue amlodipine 10mg daily     7. ADDITIONAL AGENTS   - consider metoprolol 25mg ER daily based upon zio patch   - continue doxazosin 4mg qhs    LIPID MANAGEMENT:   Qualifies for Statin Therapy Based on 2018 ACC/AHA Guidelines: no, Primary Prevention- 20-38yo  The ASCVD Risk score (Phyllis KEVIN, et al., 2019) failed to calculate., N/A  Major ASCVD events: None    High-risk conditions: N/A  Risk-enhancers: Persistently elevated LDL-C >159, Persistently elevated trigs >174, and apoB >129 = severe primary HLD  Currently on Statin: No  Tx goals: reduce LDL-C 30-49% and LDL-C <100 (consider non-HDL-C <130, apoB <90)  At goal? No, 2/2023  Plan:   - reinforced ongoing TLC measures as noted   - monitor labs   Meds:  none at this time     GLYCEMIA MANAGEMENT:  Normal  # Metabolic syndrome - 4/5 components   - higher components equates to higher system-wide inflammation and ASCVD and T2D risk   - estimated 2-fold increase ASCVD events for both primary/secondary prevention   - estimated 4-6-fold increase in development of full-blown T2D  Plan:  - focus on healthy macromolecules choices and overall reduced kcal diet, such as Med diet approach  - focus on body weight reduction of 5-7% as weight loss goal      ANTIPLATELET/ANTICOAGULANT THERAPY:  not indicated     OTHER ISSUES:     # OPAL - a barrier for HTN, certainly.  He feels this is stabilizing but he does recall a lot of anxiety when he takes his actual BP.     - We discussed not worrying about the values, but just recording them for our review.  - f/u with PCP to review further treatment    # palpitations - check zio patch  Provided reassurance of low prob of ischemic HD or pathologic arrhythmia   Prior normal echo     Studies Ordered At This Visit: zio patch   Blood Work to Be Obtained Prior to Next Visit: none   Disposition:  will call zio patch results, f/u 6mo      Darryl Perez M.D.  V[facility]   [facility]   [phone]

## 2023-08-09 ENCOUNTER — HOSPITAL ENCOUNTER (OUTPATIENT)
Facility: MEDICAL CENTER | Age: 33
End: 2023-08-09
Attending: PHYSICIAN ASSISTANT
Payer: COMMERCIAL

## 2023-08-09 ENCOUNTER — OFFICE VISIT (OUTPATIENT)
Dept: MEDICAL GROUP | Facility: MEDICAL CENTER | Age: 33
End: 2023-08-09
Payer: COMMERCIAL

## 2023-08-09 VITALS
BODY MASS INDEX: 30.07 KG/M2 | DIASTOLIC BLOOD PRESSURE: 88 MMHG | HEART RATE: 85 BPM | HEIGHT: 69 IN | SYSTOLIC BLOOD PRESSURE: 138 MMHG | TEMPERATURE: 98.1 F | WEIGHT: 203 LBS | OXYGEN SATURATION: 100 %

## 2023-08-09 DIAGNOSIS — Z79.899 CHRONIC USE OF BENZODIAZEPINE FOR THERAPEUTIC PURPOSE: ICD-10-CM

## 2023-08-09 DIAGNOSIS — H61.22 IMPACTED CERUMEN OF LEFT EAR: ICD-10-CM

## 2023-08-09 DIAGNOSIS — F41.1 GENERALIZED ANXIETY DISORDER: ICD-10-CM

## 2023-08-09 PROBLEM — Z86.16 HISTORY OF 2019 NOVEL CORONAVIRUS DISEASE (COVID-19): Status: RESOLVED | Noted: 2020-12-06 | Resolved: 2023-08-09

## 2023-08-09 PROBLEM — E66.09 CLASS 1 OBESITY DUE TO EXCESS CALORIES WITH SERIOUS COMORBIDITY AND BODY MASS INDEX (BMI) OF 30.0 TO 30.9 IN ADULT: Status: RESOLVED | Noted: 2023-01-06 | Resolved: 2023-08-09

## 2023-08-09 PROBLEM — E66.811 CLASS 1 OBESITY DUE TO EXCESS CALORIES WITH SERIOUS COMORBIDITY AND BODY MASS INDEX (BMI) OF 30.0 TO 30.9 IN ADULT: Status: RESOLVED | Noted: 2023-01-06 | Resolved: 2023-08-09

## 2023-08-09 PROCEDURE — 99214 OFFICE O/P EST MOD 30 MIN: CPT | Performed by: PHYSICIAN ASSISTANT

## 2023-08-09 PROCEDURE — 3075F SYST BP GE 130 - 139MM HG: CPT | Performed by: PHYSICIAN ASSISTANT

## 2023-08-09 PROCEDURE — 3079F DIAST BP 80-89 MM HG: CPT | Performed by: PHYSICIAN ASSISTANT

## 2023-08-09 PROCEDURE — 80307 DRUG TEST PRSMV CHEM ANLYZR: CPT

## 2023-08-09 RX ORDER — SERTRALINE HYDROCHLORIDE 100 MG/1
100 TABLET, FILM COATED ORAL DAILY
Qty: 90 TABLET | Refills: 2 | Status: SHIPPED | OUTPATIENT
Start: 2023-08-09 | End: 2024-01-10 | Stop reason: SDUPTHER

## 2023-08-09 RX ORDER — ALPRAZOLAM 0.5 MG/1
0.5 TABLET ORAL
Qty: 20 TABLET | Refills: 2 | Status: SHIPPED | OUTPATIENT
Start: 2023-08-09 | End: 2023-09-08

## 2023-08-09 ASSESSMENT — FIBROSIS 4 INDEX: FIB4 SCORE: 0.34

## 2023-08-09 ASSESSMENT — PATIENT HEALTH QUESTIONNAIRE - PHQ9: CLINICAL INTERPRETATION OF PHQ2 SCORE: 0

## 2023-08-09 NOTE — ASSESSMENT & PLAN NOTE
This is a pleasant 32-year-old male here today to follow-up on his history of anxiety and panic attacks.  No longer sees psychiatry.  He is on 100 mg of sertraline.  Received 20 tablets of 0.5 mg of alprazolam monthly to control his panic attacks.  He would like to have his sertraline increased to a higher dose.  This was also under the advice of vascular medicine and Dr. Perez.

## 2023-08-09 NOTE — PROGRESS NOTES
"Subjective:   Geremias Conte is a 32 y.o. male here today for anxiety and left ear wax.    OPAL (generalized anxiety disorder)  This is a pleasant 32-year-old male here today to follow-up on his history of anxiety and panic attacks.  No longer sees psychiatry.  He is on 100 mg of sertraline.  Received 20 tablets of 0.5 mg of alprazolam monthly to control his panic attacks.  He would like to have his sertraline increased to a higher dose.  This was also under the advice of vascular medicine and Dr. Perez.       Current medicines (including changes today)  Current Outpatient Medications   Medication Sig Dispense Refill    sertraline (ZOLOFT) 100 MG Tab Take 1 Tablet by mouth every day for 270 days. Add to 50 mg to total 150 mg. 90 Tablet 2    sertraline (ZOLOFT) 50 MG Tab Take 1 Tablet by mouth every day for 270 days. Add 100 mg to total 150 mg. 90 Tablet 2    ALPRAZolam (XANAX) 0.5 MG Tab Take 1 Tablet by mouth 1 time a day as needed for Anxiety for up to 30 days. 20 Tablet 2    amLODIPine (NORVASC) 10 MG Tab TAKE ONE TABLET BY MOUTH EVERY DAY 90 Tablet 0    doxazosin (CARDURA) 4 MG Tab Take 1 Tablet by mouth every evening. 90 Tablet 3    telmisartan (MICARDIS) 80 MG Tab Take 1 Tablet by mouth at bedtime for 360 days. 90 Tablet 3     No current facility-administered medications for this visit.     He  has a past medical history of Anxiety (8/8/2014) and Inappropriate sinus tachycardia (8/8/2014).    Social History and Family History were reviewed and updated.    ROS   No chest pain, no shortness of breath, no abdominal pain and all other systems were reviewed and are negative.       Objective:     /88 (BP Location: Left arm, Patient Position: Sitting, BP Cuff Size: Adult)   Pulse 85   Temp 36.7 °C (98.1 °F) (Temporal)   Ht 1.753 m (5' 9\")   Wt 92.1 kg (203 lb)   SpO2 100%  Body mass index is 29.98 kg/m².   Physical Exam:  Constitutional: Alert, no distress.  Skin: Warm, dry, good turgor, no rashes in " visible areas.  Eye: Equal, round and reactive, conjunctiva clear, lids normal.  Left ear with cerumen impaction.  Right side is open.  ENMT: Lips without lesions, good dentition, oropharynx clear.  Neck: Trachea midline, no masses.   Respiratory: Unlabored respiratory effort.  Cardiovascular: Normal S1, S2, no murmur, no edema.  Abdomen: Soft, non-tender, no masses.  Psych: Alert and oriented x3, normal affect and mood.        Assessment and Plan:   The following treatment plan was discussed    1. OPAL (generalized anxiety disorder)  Chronic condition.  Increase Zoloft 250 mg with 100 and a 50 mg tablet.  Renewed alprazolam with a 3-month refill of medications.  Sent over 20 tablets for each month.  DMP was reviewed.  Urine drug screen completed.  Agreement signed.    - sertraline (ZOLOFT) 100 MG Tab; Take 1 Tablet by mouth every day for 270 days. Add to 50 mg to total 150 mg.  Dispense: 90 Tablet; Refill: 2  - sertraline (ZOLOFT) 50 MG Tab; Take 1 Tablet by mouth every day for 270 days. Add 100 mg to total 150 mg.  Dispense: 90 Tablet; Refill: 2  - ALPRAZolam (XANAX) 0.5 MG Tab; Take 1 Tablet by mouth 1 time a day as needed for Anxiety for up to 30 days.  Dispense: 20 Tablet; Refill: 2    2. Chronic use of benzodiazepine for therapeutic purpose  Chronic use of benzos. Alprazolam.  Urine drug screen was completed.  Agreement signed.  - Controlled Substance Treatment Agreement  - URINE DRUG SCREEN; Future    3. Impacted cerumen of left ear  Chronic condition.  Discussed using Debrox.  Follow-up with lavage.         Followup: Return in about 3 months (around 11/9/2023), or if symptoms worsen or fail to improve, for Also follow-up for his lavage.    Please note that this dictation was created using voice recognition software. I have made every reasonable attempt to correct obvious errors, but I expect that there are errors of grammar and possibly content that I did not discover before finalizing the note.

## 2023-08-11 ENCOUNTER — TELEPHONE (OUTPATIENT)
Dept: HEALTH INFORMATION MANAGEMENT | Facility: OTHER | Age: 33
End: 2023-08-11
Payer: COMMERCIAL

## 2023-08-11 LAB
AMPHETAMINES UR QL: NEGATIVE NG/ML
BARBITURATES UR QL: NEGATIVE NG/ML
BENZODIAZ UR QL: POSITIVE NG/ML
CANNABINOIDS UR QL SCN: NEGATIVE NG/ML
COCAINE UR QL: NEGATIVE NG/ML
DRUG SCREEN COMMENT UR-IMP: NORMAL
MDMA CTO UR SCN-MCNC: NEGATIVE NG/ML
METHADONE UR QL: NEGATIVE NG/ML
OPIATES UR QL: NEGATIVE NG/ML
OXYCODONE CTO UR SCN-MCNC: NEGATIVE NG/ML
PCP UR QL SCN: NEGATIVE NG/ML
PROPOXYPH UR QL: NEGATIVE NG/ML

## 2023-08-14 ENCOUNTER — NON-PROVIDER VISIT (OUTPATIENT)
Dept: CARDIOLOGY | Facility: MEDICAL CENTER | Age: 33
End: 2023-08-14
Attending: FAMILY MEDICINE
Payer: COMMERCIAL

## 2023-08-14 DIAGNOSIS — I49.1 APC (ATRIAL PREMATURE CONTRACTIONS): ICD-10-CM

## 2023-08-14 DIAGNOSIS — R00.2 PALPITATIONS: ICD-10-CM

## 2023-08-14 NOTE — PROGRESS NOTES
Home enrollment completed in the 14 day [employer] monitoring program per Darryl Perez MD. Monitor will be shipped to patient via [employer]. Pending EOS.

## 2023-08-30 ENCOUNTER — APPOINTMENT (OUTPATIENT)
Dept: MEDICAL GROUP | Facility: MEDICAL CENTER | Age: 33
End: 2023-08-30
Payer: COMMERCIAL

## 2023-09-14 ENCOUNTER — TELEPHONE (OUTPATIENT)
Dept: CARDIOLOGY | Facility: MEDICAL CENTER | Age: 33
End: 2023-09-14
Payer: COMMERCIAL

## 2023-09-15 PROCEDURE — 93248 EXT ECG>7D<15D REV&INTERPJ: CPT | Performed by: INTERNAL MEDICINE

## 2023-09-23 DIAGNOSIS — I10 ESSENTIAL HYPERTENSION: ICD-10-CM

## 2023-09-25 RX ORDER — AMLODIPINE BESYLATE 10 MG/1
10 TABLET ORAL
Qty: 90 TABLET | Refills: 0 | Status: SHIPPED | OUTPATIENT
Start: 2023-09-25 | End: 2023-12-11

## 2023-11-03 ENCOUNTER — APPOINTMENT (OUTPATIENT)
Dept: MEDICAL GROUP | Facility: MEDICAL CENTER | Age: 33
End: 2023-11-03
Payer: COMMERCIAL

## 2023-12-09 DIAGNOSIS — I10 ESSENTIAL HYPERTENSION: ICD-10-CM

## 2023-12-11 RX ORDER — AMLODIPINE BESYLATE 10 MG/1
10 TABLET ORAL
Qty: 90 TABLET | Refills: 0 | Status: SHIPPED | OUTPATIENT
Start: 2023-12-11 | End: 2024-02-15 | Stop reason: SDUPTHER

## 2023-12-14 NOTE — TELEPHONE ENCOUNTER
12/14/2023    Video-Visit Details  Type of service:  Video Visit   Originating Location (pt. Location): Home  Distant Location (provider location):  Off-site  Platform used for Video Visit: Claudia  Length of video visit: 29 minutes    Referring Provider: self-referred    Presenting Information:   Today Lily elected for a virtual genetic counseling visit through the Cancer Risk Management Program to discuss her family history of juvenile polyposis syndrome (JPS). We reviewed this history, cancer screening recommendations, and available genetic testing options.    Personal History:  Lily is a 19 year old female. She does not have any personal history of cancer.    She had her first menstrual period at age 15, does not have biological children, and is premenopausal. Lily has her ovaries, fallopian tubes and uterus in place, and she has had no ovarian cancer screening to date. She reports that she has never used hormone replacement therapy.      Lily has not had a mammogram or colonoscopy. She has a history of splenic cysts and her most recent ultrasound in April 2023 identified splenic cysts and a pancreatic lesion. She does not regularly do any other cancer screening at this time.    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 She had no questions about the condition at this time.    Family History: (Please see scanned pedigree for detailed family history information)  Lily has two siblings.  Her sister passed away at birth, possibly related to a heart defect.  Her brother is 21 and has not had a cancer, nor has he pursued genetic testing.  Lily's mother is 49 and has not had a cancer, but has had approximately 2-5 colon polyps. 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  Thank you for doing this.  Please let me know when the patient responds.  You may also want to try my chart messaging him.   received from her mother.  Lily's maternal aunt is 47 and was diagnosed with cervical cancer at age 33. She also had a colectomy to address hundreds of colon polyps; she has not pursued genetic testing.  Lily's maternal grandmother was diagnosed with breast cancer at age 34, colon cancer at age 35, and passed away at age 37.  Lily's maternal grandfather is 77 and was diagnosed with non-metastatic prostate cancer in his 60's.  Aside from non-melanoma skin cancer, Lily reports that she has no known paternal family history of cancer.  Her maternal ethnicity is [place] and American. Her paternal ethnicity is [place] and [place]. There is no known Ashkenazi Pentec[employer]alism ancestry on either side of her family.    Discussion:  We reviewed the features of sporadic, familial, and hereditary cancers. Lily's maternal family history is consistent with hereditary cancer, as several maternal relatives have been diagnosed with related cancers and/or colon polyps; several of her relatives have also been diagnosed under age 50. The identification of the BMPR1A mutation in her mother further supports this risk assessment.  We discussed the natural history and genetics of juvenile polyposis syndrome (JPS) due to mutations in the BMPR1A gene.   We reviewed that individuals with JPS are at increased risk for juvenile-type polyps, though the amount of polyps can vary dramatically even within the same family. Individuals with JPS are also at increased risk for certain cancers, including colon and stomach cancer. Published guidelines are available to help manage these cancer risks, including colonoscopies and upper endoscopies beginning at age 18 and repeated every 1-3 years. Given her history of a connective tissue disorder and risks associated with these procedures, this screening will likely need to be discussed in detail with a GI specialist (if needed).   We reviewed that mutations in the BMPR1A gene are inherited in an  autosomal dominant pattern. This means Lily has a 50% chance to carry the mutation identified in her mother.  Based on her family history, Lily meets current National Comprehensive Cancer Network (NCCN) criteria for genetic testing of the BMPR1A gene.  A detailed handout regarding JPS and the information we discussed was provided to Lily at the end of our appointment today and can be found in the after visit summary. Topics included: inheritance pattern, cancer risks, cancer screening recommendations, and also risks, benefits and limitations of testing.  We then discussed that though her paternal family history does not currently present with features consistent with hereditary cancer, her mother's genetic testing result cannot address an potential paternally inherited cancer risks. As many of the genes associated with cancer risk present with overlapping features in a family and accurate cancer risk cannot always be established based upon the pedigree analysis alone, it is often reasonable to consider panel genetic testing to analyze multiple genes at once.  We reviewed Lily's genetic testing options: 1) BMPR1A single gene analysis, 2) [facility] Common Hereditary Cancers Panel, or 3) [facility] Multi-Cancer Panel. She opted for the [facility] Common Hereditary Cancers Panel.  Genetic testing is available for 48 genes associated with cancers of the breast, ovary, uterus, prostate, and gastrointestinal system: APC, KETURAH, AXIN2, BAP1, BARD1, BMPR1A, BRCA1, BRCA2, BRIP1, CDH1, CDK4, CDKN2A, CHEK2, CTNNA1, DICER1, EPCAM, FH, GREM1, HOXB13, KIT, MBD4, MEN1, MLH1, MSH2, MSH3, MSH6, MUTYH, NF1, NTHL1, PALB2, PDGFRA, PMS2, POLD1, POLE, PTEN, RAD51C, RAD51D, SDHA, SDHB, SDHC, SDHD, SMAD4, SMARCA4, STK11, TP53, TSC1, TSC2, VHL.  Consent was obtained over the video and Lily elected to schedule a lab appointment at her earliest convenience. Once her blood is drawn, genetic testing using the Common Hereditary  Cancers Panel will be sent to [place]. Of note, testing will begin with the BMPR1A gene with reflex to the complete panel. Turn around time: 2-3 weeks [facility].  Medical Management: For Lily, we reviewed that the information from genetic testing may determine:  additional cancer screening for which Lily may qualify (i.e. mammogram and breast MRI, more frequent colonoscopies, more frequent dermatologic exams, etc.),  options for risk reducing surgeries Lily could consider (i.e. bilateral mastectomy, surgery to remove her ovaries and/or uterus, etc.),    and targeted chemotherapies if she were to develop certain cancers in the future (i.e. immunotherapy for individuals with Aceves syndrome, PARP inhibitors, etc.).   These recommendations and possible targeted chemotherapies will be discussed in detail once genetic testing is completed.     Plan:  1) Today Lily elected to proceed with testing of the BMPR1A gene, with reflex to the [facility] Common Hereditary Cancers Panel, through [facility]. She will schedule a lab appointment at her earliest convenience.  2) The results should be available 2-3 weeks [facility].  3) Lily will be contacted to schedule a virtual return visit with me to discuss the results.    Carolin Washington MS, [facility]  Licensed, Certified Genetic Counselor  Office: [phone]  Pager: [phone]

## 2024-01-10 ENCOUNTER — TELEMEDICINE (OUTPATIENT)
Dept: MEDICAL GROUP | Facility: MEDICAL CENTER | Age: 34
End: 2024-01-10
Payer: COMMERCIAL

## 2024-01-10 VITALS — WEIGHT: 203 LBS | HEIGHT: 69 IN | BODY MASS INDEX: 30.07 KG/M2

## 2024-01-10 DIAGNOSIS — R09.81 NASAL CONGESTION: ICD-10-CM

## 2024-01-10 DIAGNOSIS — F41.1 GENERALIZED ANXIETY DISORDER: ICD-10-CM

## 2024-01-10 PROCEDURE — 99214 OFFICE O/P EST MOD 30 MIN: CPT | Mod: 95 | Performed by: PHYSICIAN ASSISTANT

## 2024-01-10 RX ORDER — FLUTICASONE PROPIONATE 50 MCG
1 SPRAY, SUSPENSION (ML) NASAL 2 TIMES DAILY
Qty: 16 G | Refills: 1 | Status: SHIPPED | OUTPATIENT
Start: 2024-01-10 | End: 2024-02-09

## 2024-01-10 RX ORDER — SERTRALINE HYDROCHLORIDE 100 MG/1
100 TABLET, FILM COATED ORAL DAILY
Qty: 90 TABLET | Refills: 2 | Status: SHIPPED | OUTPATIENT
Start: 2024-01-10 | End: 2024-10-06

## 2024-01-10 RX ORDER — ALPRAZOLAM 0.5 MG/1
0.5 TABLET ORAL NIGHTLY PRN
Qty: 20 TABLET | Refills: 0 | Status: SHIPPED | OUTPATIENT
Start: 2024-01-10 | End: 2024-02-16

## 2024-01-10 ASSESSMENT — PATIENT HEALTH QUESTIONNAIRE - PHQ9: CLINICAL INTERPRETATION OF PHQ2 SCORE: 0

## 2024-01-10 ASSESSMENT — FIBROSIS 4 INDEX: FIB4 SCORE: 0.35

## 2024-01-11 NOTE — PROGRESS NOTES
Subjective:   Geremias Conte is a 33 y.o. male here today for chronic nasal congestion and anxiety.    This evaluation was conducted via Zoom using secure and encrypted videoconferencing technology. The patient was in their home in the [facility].    The patient's identity was confirmed and verbal consent was obtained for this virtual visit.    Nasal congestion  This is a pleasant 33-year-old male on a virtual visit.  Complains of a 1 year history of nasal congestion.  Has difficulty breathing through his nasal passages.  Feels like his nose airway is always blocked.  Has been using over-the-counter sinus rinses without any improvement.  Complain when he blows his nose now that he will get bloody noses at times.    OPAL (generalized anxiety disorder)  Like a renewal of Xanax.  Last time I provided 20 tablets in well over a month to use the medication.  He also states that he is having a hard time getting Zoloft at [facility].  I provided him a 100 and a 50 mg prescription to total 150.  They always look at him like he does not know what he is talking about.  I have sent over both prescription repeatedly.       Current medicines (including changes today)  Current Outpatient Medications   Medication Sig Dispense Refill    fluticasone (FLONASE) 50 MCG/ACT nasal spray Administer 1 Spray into affected nostril(S) 2 times a day for 30 days. 16 g 1    ALPRAZolam (XANAX) 0.5 MG Tab Take 1 Tablet by mouth at bedtime as needed for Sleep for up to 30 days. 20 Tablet 0    sertraline (ZOLOFT) 50 MG Tab Take 1 Tablet by mouth every day for 270 days. Add 100 mg to total 150 mg. 90 Tablet 2    sertraline (ZOLOFT) 100 MG Tab Take 1 Tablet by mouth every day for 270 days. Add to 50 mg to total 150 mg. 90 Tablet 2    amLODIPine (NORVASC) 10 MG Tab TAKE ONE TABLET BY MOUTH EVERY DAY 90 Tablet 0    doxazosin (CARDURA) 4 MG Tab Take 1 Tablet by mouth every evening. 90 Tablet 3     No current facility-administered medications for this visit.  "    He  has a past medical history of Anxiety (8/8/2014) and Inappropriate sinus tachycardia (8/8/2014).    Social History and Family History were reviewed and updated.    ROS   No chest pain, no shortness of breath, no abdominal pain and all other systems were reviewed and are negative.       Objective:     Ht 1.753 m (5' 9\")   Wt 92.1 kg (203 lb)  Body mass index is 29.98 kg/m².   Physical Exam:  Constitutional: Alert, no distress.  Skin: Warm, dry, good turgor, no rashes in visible areas.  Eye: Equal, round and reactive, conjunctiva clear, lids normal.  ENMT: Lips without lesions, good dentition, oropharynx clear.  Psych: Alert and oriented x3, normal affect and mood.        Assessment and Plan:   The following treatment plan was discussed    1. Nasal congestion  New condition noted in chart but chronic.  Unknown cause.  Discussed use of Flonase.  Use as directed.  If cause worsening nasal bleeding to stop the medication.  Refer to ENT for examination.  - fluticasone (FLONASE) 50 MCG/ACT nasal spray; Administer 1 Spray into affected nostril(S) 2 times a day for 30 days.  Dispense: 16 g; Refill: 1  - Referral to ENT    2. OPAL (generalized anxiety disorder)  Chronic condition.  Stable.  Renewed Xanax.   reviewed.  Urine drug screen agreement up-to-date.  Renewed Zoloft at both 50 and 100 mg dosing.  Note to pharmacy that he takes both medications a total of 150 mg.  If there are any problems with the pharmacy at this time with the note to the pharmacy I will contact pharmacist directly.  Advised to contact [facility] through [facility] if this continues to be a problem.  - ALPRAZolam (XANAX) 0.5 MG Tab; Take 1 Tablet by mouth at bedtime as needed for Sleep for up to 30 days.  Dispense: 20 Tablet; Refill: 0  - sertraline (ZOLOFT) 50 MG Tab; Take 1 Tablet by mouth every day for 270 days. Add 100 mg to total 150 mg.  Dispense: 90 Tablet; Refill: 2  - sertraline (ZOLOFT) 100 MG Tab; Take 1 Tablet by mouth every day for 270 " days. Add to 50 mg to total 150 mg.  Dispense: 90 Tablet; Refill: 2         Followup: Return in about 3 months (around 4/10/2024), or if symptoms worsen or fail to improve.    Please note that this dictation was created using voice recognition software. I have made every reasonable attempt to correct obvious errors, but I expect that there are errors of grammar and possibly content that I did not discover before finalizing the note.

## 2024-01-11 NOTE — ASSESSMENT & PLAN NOTE
Like a renewal of Xanax.  Last time I provided 20 tablets in well over a month to use the medication.  He also states that he is having a hard time getting Zoloft at [facility].  I provided him a 100 and a 50 mg prescription to total 150.  They always look at him like he does not know what he is talking about.  I have sent over both prescription repeatedly.

## 2024-01-11 NOTE — ASSESSMENT & PLAN NOTE
This is a pleasant 33-year-old male on a virtual visit.  Complains of a 1 year history of nasal congestion.  Has difficulty breathing through his nasal passages.  Feels like his nose airway is always blocked.  Has been using over-the-counter sinus rinses without any improvement.  Complain when he blows his nose now that he will get bloody noses at times.

## 2024-02-02 ENCOUNTER — DOCUMENTATION (OUTPATIENT)
Dept: VASCULAR LAB | Facility: MEDICAL CENTER | Age: 34
End: 2024-02-02
Payer: COMMERCIAL

## 2024-02-02 DIAGNOSIS — I1A.0 RESISTANT HYPERTENSION: ICD-10-CM

## 2024-02-02 NOTE — PROGRESS NOTES
Established patient  Chart prep for upcoming appointment with Vascular APRN    Any pending/incomplete orders from last visit? No, all orders completed.  Were any records requested?  No  Correct appointment type (New/Established/virtual)? Yes  Referral up to date? Yes renewal ordered and sent to provider to co-sign   Referral attached to appointment (renewals and New patients only)? Yes      Vida Betancourt, Medical Assistant   [facility]   Ph: [phone]  Fx: [phone]

## 2024-02-08 ENCOUNTER — APPOINTMENT (OUTPATIENT)
Dept: VASCULAR LAB | Facility: MEDICAL CENTER | Age: 34
End: 2024-02-08
Payer: COMMERCIAL

## 2024-02-15 ENCOUNTER — OFFICE VISIT (OUTPATIENT)
Dept: VASCULAR LAB | Facility: MEDICAL CENTER | Age: 34
End: 2024-02-15
Attending: NURSE PRACTITIONER
Payer: COMMERCIAL

## 2024-02-15 VITALS
HEIGHT: 69 IN | DIASTOLIC BLOOD PRESSURE: 105 MMHG | BODY MASS INDEX: 30.51 KG/M2 | HEART RATE: 87 BPM | SYSTOLIC BLOOD PRESSURE: 157 MMHG | WEIGHT: 206 LBS

## 2024-02-15 DIAGNOSIS — E78.1 HYPERTRIGLYCERIDEMIA: ICD-10-CM

## 2024-02-15 DIAGNOSIS — F41.1 GENERALIZED ANXIETY DISORDER: ICD-10-CM

## 2024-02-15 DIAGNOSIS — E78.2 MIXED HYPERLIPIDEMIA: ICD-10-CM

## 2024-02-15 DIAGNOSIS — I10 ESSENTIAL HYPERTENSION: ICD-10-CM

## 2024-02-15 DIAGNOSIS — I1A.0 RESISTANT HYPERTENSION: ICD-10-CM

## 2024-02-15 DIAGNOSIS — E88.810 METABOLIC SYNDROME: ICD-10-CM

## 2024-02-15 PROCEDURE — 99212 OFFICE O/P EST SF 10 MIN: CPT

## 2024-02-15 PROCEDURE — 3080F DIAST BP >= 90 MM HG: CPT

## 2024-02-15 PROCEDURE — 99214 OFFICE O/P EST MOD 30 MIN: CPT

## 2024-02-15 PROCEDURE — 3077F SYST BP >= 140 MM HG: CPT

## 2024-02-15 RX ORDER — AMLODIPINE BESYLATE 10 MG/1
10 TABLET ORAL
Qty: 90 TABLET | Refills: 1 | Status: SHIPPED | OUTPATIENT
Start: 2024-02-15

## 2024-02-15 RX ORDER — TELMISARTAN 80 MG/1
80 TABLET ORAL DAILY
Qty: 90 TABLET | Refills: 1 | Status: SHIPPED | OUTPATIENT
Start: 2024-02-15

## 2024-02-15 ASSESSMENT — ENCOUNTER SYMPTOMS
FOCAL WEAKNESS: 0
DIZZINESS: 0
DOUBLE VISION: 0
PALPITATIONS: 0
NERVOUS/ANXIOUS: 1
BLURRED VISION: 0
HEADACHES: 0
SHORTNESS OF BREATH: 0
WEIGHT LOSS: 0
WHEEZING: 0

## 2024-02-15 ASSESSMENT — FIBROSIS 4 INDEX: FIB4 SCORE: 0.35

## 2024-02-15 NOTE — TELEPHONE ENCOUNTER
Received request via: Pharmacy    Was the patient seen in the last year in this department? Yes    Does the patient have an active prescription (recently filled or refills available) for medication(s) requested? No      Does the patient have long term Plus and need 100 day supply (blood pressure, diabetes and cholesterol meds only)? Patient does not have SCP

## 2024-02-15 NOTE — PROGRESS NOTES
" RESISTANT HTN CLINIC - FOLLOW-UP   02/15/2024    Geremias Conte is a 34 yo male seen for evaluation of resistant HTN and management.   Geremias Conte is initially referred by Teodoro Sellers P.A.-C    Subjective      Interval hx/concerns:   Last seen 8/3/2023  doing well, no complaints  Still working with PCP for anxiety  Anxiety still remains a significant factor  Not taking BPs at home as it is anxiety producing  Only taking amlodipine   Stopped doxazosin and telmisartan as he felt his BP was better at last appt and that he \"didn't need the other meds any more\"  Denies CP, SOB, palpitations or leg swelling  No TIA/CVA symptoms  Denies HA or vision changes    HTN:  Home BP log: not checking   Adherence to current HTN meds: stopped doxazosin and telmisartan, consistently takes amlodipine   Lifestyle factors:  Weight Change: stable   Wt Readings from Last 3 Encounters:   02/15/24 93.4 kg (206 lb)   01/10/24 92.1 kg (203 lb)   08/09/23 92.1 kg (203 lb)      Diet pattern: common adult  Daily salt intake estimate:  High     EtOH: Yes, Details: heavier use,  3+ beers/day   Exercise habits: no regular exercise program  Perceived barriers to care: none   Pertinent HTN hx (reviewed at initial visit):   Age at HTN dx: more than >3mo ago noted very elevated 190s/?  Past HTN medications: lisinopril   HTN crises:  No prior hospitalization or crises   Interfering substances/contributing factors:  None    Hyperlipidemia:  Stable, no current concerns  Current treatment: lifestyle changes  and no current medications   Latest Reference Range & Units Most Recent   Cholesterol,Tot 100 - 199 mg/dL 292 (H)  3/28/22 10:09   Triglycerides 0 - 149 mg/dL 362 (H)  3/28/22 10:09   HDL >=40 mg/dL 40  3/28/22 10:09   LDL <100 mg/dL 180 (H)  3/28/22 10:09        Sleeping disorder/ANN: Yes, Details: heavy snoring, had PSG in past few years ago and told no sleep apena   Has order for OPO but not yet completed     Current Outpatient Medications:     " "amLODIPine, 10 mg, Oral, QDAY    telmisartan, 80 mg, Oral, DAILY    sertraline, 50 mg, Oral, DAILY, Taking    sertraline, 100 mg, Oral, DAILY, Taking    doxazosin, 4 mg, Oral, Nightly, Taking     Social History     Tobacco Use    Smoking status: Never    Smokeless tobacco: Never   Vaping Use    Vaping Use: Never used   Substance Use Topics    Alcohol use: Yes     Alcohol/week: 7.2 oz     Types: 12 Cans of beer per week     Comment: mostly weekends (5-20beers; 12pack on avg)    Drug use: No      Review of Systems   Constitutional:  Negative for malaise/fatigue and weight loss.   Eyes:  Negative for blurred vision and double vision.   Respiratory:  Negative for shortness of breath and wheezing.    Cardiovascular:  Negative for chest pain, palpitations and leg swelling.   Neurological:  Negative for dizziness, focal weakness and headaches.   Psychiatric/Behavioral:  The patient is nervous/anxious.            Objective    Vitals:    02/15/24 0924 02/15/24 0929 02/15/24 0954   BP: (!) 162/107 (!) 162/111 (!) 157/105   BP Location: Left arm Left arm    Patient Position: Sitting Sitting    BP Cuff Size: Adult Adult    Pulse: 92 82 87   Weight: 93.4 kg (206 lb)     Height: 1.753 m (5' 9\")       Body mass index is 30.42 kg/m².  BP Readings from Last 5 Encounters:   02/15/24 (!) 157/105   08/09/23 138/88   08/03/23 127/81   07/23/23 (!) 168/102   04/13/23 126/82      Physical Exam  Vitals reviewed.   Constitutional:       General: He is not in acute distress.     Appearance: He is not diaphoretic.   HENT:      Head: Normocephalic and atraumatic.   Eyes:      General: No scleral icterus.  Cardiovascular:      Rate and Rhythm: Normal rate and regular rhythm.      Heart sounds: Normal heart sounds. No murmur heard.  Pulmonary:      Effort: Pulmonary effort is normal. No respiratory distress.      Breath sounds: Normal breath sounds. No wheezing.   Musculoskeletal:         General: No swelling. Normal range of motion.   Skin:     " General: Skin is warm and dry.      Coloration: Skin is not pale.   Neurological:      General: No focal deficit present.      Mental Status: He is alert and oriented to person, place, and time.      Coordination: Coordination normal.      Gait: Gait normal.   Psychiatric:         Mood and Affect: Mood normal.         Behavior: Behavior normal.        Accessory Clinical Findings:     Lab Results   Component Value Date    CHOLSTRLTOT 293 (H) 02/03/2023     (H) 02/03/2023    HDL 44 02/03/2023    TRIGLYCERIDE 310 (H) 02/03/2023      Lab Results   Component Value Date/Time    LIPOPROTA <6 02/03/2023 09:32 AM      Lab Results   Component Value Date/Time    APOB 169 (H) 02/03/2023 09:32 AM            Lab Results   Component Value Date    HBA1C 4.7 03/28/2022      Lab Results   Component Value Date    SODIUM 138 02/03/2023    POTASSIUM 4.0 02/03/2023    CHLORIDE 102 02/03/2023    CO2 22 02/03/2023    GLUCOSE 100 (H) 02/03/2023    BUN 10 02/03/2023    CREATININE 0.65 02/03/2023      Lab Results   Component Value Date    ALDOSTERONE 16.6 06/01/2023       Latest Reference Range & Units 02/03/23 09:29 02/03/23 09:31 02/03/23 09:32   Aldos Serum ng/dL   11.1   Renin Activity ng/mL/hr  0.1    Metanephrine Plasma 0.00 - 0.49 nmol/L 0.15     Normetanephrine Plasma 0.00 - 0.89 nmol/L 0.49        Latest Reference Range & Units 02/03/23 09:32   Cortisol 0.0 - 23.0 ug/dL 6.3     Lab Results   Component Value Date    URCREAT 52.36 02/03/2023    MICROALBUR <1.2 02/03/2023    MALBCRT see below 02/03/2023    METANEPHPL 0.15 02/03/2023     Lab Results   Component Value Date/Time    MALBCRT see below 02/03/2023 09:30 AM    MICROALBUR <1.2 02/03/2023 09:30 AM      VASCULAR IMAGING:     Echo 2014  Normal left ventricular systolic function.   Left ventricular ejection fraction is 60% to 65%.   No significant valve abnormalities.     CT abd 2016  CT Abdomen:  The liver, spleen, adrenal glands, pancreas, kidneys, and gallbladder are  unremarkable.     Cardiac event 2017  NO SIGNIFICANT AV BLOCK   NO SINUS PAUSE   RARE PREMATURE VENTRICULAR COMPLEX   RARE PREMATURE ATRIAL COMPLEX    RAD 2/3/23  .  There is no evidence of renal artery stenosis.    Echo 6/2023  Prior echo 8/21/14  Normal transthoracic echocardiogram.     Zio patch 9/2023  1.  Predominant rhythm was sinus rhythm with an average heart rate of 85 bpm.   2.  No episodes of ventricular tachycardia.   3.  No episodes of supraventricular tachycardia.   4.  No episodes of atrial fibrillation.   5.  No significant pauses or high-grade AV block.   6.  Rare PACs <1.0%.   7. There were no patient triggered events/symptoms during the monitoring period.           MEDICAL DECISION-MAKING:  TODAY'S ASSESSMENT / STATUS / PLAN:  1. Essential hypertension  amLODIPine (NORVASC) 10 MG Tab    telmisartan (MICARDIS) 80 MG Tab    Comp Metabolic Panel    MICROALBUMIN CREAT RATIO URINE      2. Mixed hyperlipidemia  Lipid Profile    APOLIPOPROTEIN B      3. Hypertriglyceridemia  Lipid Profile    APOLIPOPROTEIN B      4. Metabolic syndrome        5. Resistant hypertension             Patient Type: Primary Prevention    1. BLOOD PRESSURE CONTROL   Qualifies as Resistant HTN (RH):  Yes, Uncontrolled RH - BP above goal despite concurrent use of 3 max-tolerated meds from 3 classes   Office BP Goal ACC/AHA (2017) goal <130/80  Home BP at goal:  not checking  Office BP at goal:  no, elevated in office today  24h ABPM:  not ordered to date   Device candidate? Yes   Contributing factors: obesity, young age of onset, fhx of HTN, MetS   Presumed this is paroxysmal HTN based upon hx of anxiety/panic d/o and BP spikes with anxiety, normal metaneph and thyroid   Long discussion today on bp regimen, and importance of taking medications consistently as prescribed, and to not stop treatment. Reviewed possible long term complications of HTN, pt denies HA or vision changes.  Does endorse anxiety is a significant contributor to  his elevated BP.  Recommend pt continue to follow up with PCP for anxiety management and work on stress/anxiety reduction techniques.  Will resume all bp medications.    Currently asymptomatic HTN urgency, no s/s of end-organ damage based upon lack of CP, SOB, visual blurring , HA, n/v, gross hematuria.  Pt informed to call 911 or go directly to ED if any of these symptoms occur as this is considered HTN emergency and require prompt attention - pt verbalized understanding of these risks and ED precautions.    Plan:   Monitoring:   - continue home BP monitoring  - monitor lytes/gfr routinely - prior to next appt    2. WORK UP FOR SECONDARY CAUSES OF RH:  Obstructive Sleep Apnea:  reports heavy snoring, no apnea, neg PSG in the past.  Wife reports somewhat worse   Renal parenchymal disease: Excluded  Renovascular HTN: Excluded  Primary Aldosteronism:  reordered today, after med changes at last visit   Thyroid Function: Excluded  Pheochromocytoma: Excluded   Cushing's:   Excluded   Other: none identified    Recommendations At This Visit: as noted in orders         3. MEDICATION USE / ADHERENCE:   Assessment: Complete  Recommendations: Instructed to Continue Taking All Medications As Prescribed         4. HTN-MEDIATED END-ORGAN DAMAGE:  Left Ventricular Hypertrophy: Absent on  ECG Date: 10/2022  Urine Albuminuria: None on Date: 2023  Renal Function: G1-2  Ophthalmologic: Absent per patient report and/or eye specialist   Established Cardiovascular Disease: None    5. LIFESTYLE INTERVENTIONS:  (very important focus)     SMOKING:    reports that he has never smoked. He has never used smokeless tobacco.   - continued complete avoidance of all tobacco products     PHYSICAL ACTIVITY: continue healthy activity to improve CV fitness.  In general, targeting >150min/week of moderate-level activity or as much as tolerated    WEIGHT MANAGEMENT AND NUTRITION: Mediterranean style dietary approach , DASH style dietary approach , Focus  on reduced sodium to <2,000mg per day , and Provide specific dietary approach handouts   - reduce etoh to 2 standard beers per day - reviewed   - needs overall wt loss with a goal of 10lbs over the next 6mo - reviewed    6. STANDARD HTN PHARMACOTHERAPY:  - RESTART telmisartan 80mg daily  for improved BP control potency and true 24h BP coverage   - continue amlodipine 10mg daily     7. ADDITIONAL AGENTS   - consider metoprolol 25mg ER daily based upon zio patch   - RESTART doxazosin 4mg qhs    LIPID MANAGEMENT:   Qualifies for Statin Therapy Based on 2018 ACC/AHA Guidelines: no, Primary Prevention- 20-40yo  The ASCVD Risk score (Phyllis KEVIN, et al., 2019) failed to calculate., N/A  Major ASCVD events: None    High-risk conditions: N/A  Risk-enhancers: Persistently elevated LDL-C >159, Persistently elevated trigs >174, and apoB >129 = severe primary HLD  Currently on Statin: No  Tx goals: reduce LDL-C 30-49% and LDL-C <100 (consider non-HDL-C <130, apoB <90)  At goal? No, 2/2023  Plan:   - reinforced ongoing TLC measures as noted   - monitor labs - update  Meds:  none at this time     GLYCEMIA MANAGEMENT:  Normal  # Metabolic syndrome - 4/5 components   - higher components equates to higher system-wide inflammation and ASCVD and T2D risk   - estimated 2-fold increase ASCVD events for both primary/secondary prevention   - estimated 4-6-fold increase in development of full-blown T2D  Plan:  - focus on healthy macromolecules choices and overall reduced kcal diet, such as Med diet approach  - focus on body weight reduction of 5-7% as weight loss goal      ANTIPLATELET/ANTICOAGULANT THERAPY:  not indicated     OTHER ISSUES:     # OPAL - a barrier for HTN, certainly.  He feels this is stabilizing but he does recall a lot of anxiety when he takes his actual BP.    - again discussed not worrying about the values, but just recording them for our review - enlist wife help with taking bps.  - f/u with PCP to review further  treatment    # palpitations - normal zio patch 9/2023  Provided reassurance of low prob of ischemic HD or pathologic arrhythmia   Prior normal echo     Studies Ordered At This Visit: none  Blood Work to Be Obtained Prior to Next Visit: as noted above   Disposition:  4-6 weeks to review bp and labs      NIDIA Crawford  [facility]   [facility]   [phone]

## 2024-02-16 RX ORDER — ALPRAZOLAM 0.5 MG/1
TABLET ORAL
Qty: 20 TABLET | Refills: 0 | Status: SHIPPED | OUTPATIENT
Start: 2024-02-16 | End: 2024-02-20 | Stop reason: SDUPTHER

## 2024-02-20 DIAGNOSIS — F41.1 GENERALIZED ANXIETY DISORDER: ICD-10-CM

## 2024-02-21 RX ORDER — ALPRAZOLAM 0.5 MG/1
TABLET ORAL
Qty: 20 TABLET | Refills: 0 | Status: SHIPPED | OUTPATIENT
Start: 2024-02-21 | End: 2024-03-21

## 2024-03-22 ENCOUNTER — HOSPITAL ENCOUNTER (OUTPATIENT)
Dept: LAB | Facility: MEDICAL CENTER | Age: 34
End: 2024-03-22
Payer: COMMERCIAL

## 2024-03-22 DIAGNOSIS — E78.1 HYPERTRIGLYCERIDEMIA: ICD-10-CM

## 2024-03-22 DIAGNOSIS — I10 ESSENTIAL HYPERTENSION: ICD-10-CM

## 2024-03-22 DIAGNOSIS — E78.2 MIXED HYPERLIPIDEMIA: ICD-10-CM

## 2024-03-22 LAB
ALBUMIN SERPL BCP-MCNC: 5.1 G/DL (ref 3.2–4.9)
ALBUMIN/GLOB SERPL: 1.5 G/DL
ALP SERPL-CCNC: 104 U/L (ref 30–99)
ALT SERPL-CCNC: 39 U/L (ref 2–50)
ANION GAP SERPL CALC-SCNC: 15 MMOL/L (ref 7–16)
AST SERPL-CCNC: 27 U/L (ref 12–45)
BILIRUB SERPL-MCNC: 0.8 MG/DL (ref 0.1–1.5)
BUN SERPL-MCNC: 8 MG/DL (ref 8–22)
CALCIUM ALBUM COR SERPL-MCNC: 8.7 MG/DL (ref 8.5–10.5)
CALCIUM SERPL-MCNC: 9.6 MG/DL (ref 8.5–10.5)
CHLORIDE SERPL-SCNC: 99 MMOL/L (ref 96–112)
CHOLEST SERPL-MCNC: 270 MG/DL (ref 100–199)
CO2 SERPL-SCNC: 23 MMOL/L (ref 20–33)
CREAT SERPL-MCNC: 0.59 MG/DL (ref 0.5–1.4)
FASTING STATUS PATIENT QL REPORTED: NORMAL
GFR SERPLBLD CREATININE-BSD FMLA CKD-EPI: 132 ML/MIN/1.73 M 2
GLOBULIN SER CALC-MCNC: 3.4 G/DL (ref 1.9–3.5)
GLUCOSE SERPL-MCNC: 84 MG/DL (ref 65–99)
HDLC SERPL-MCNC: 35 MG/DL
LDLC SERPL CALC-MCNC: ABNORMAL MG/DL
POTASSIUM SERPL-SCNC: 3.7 MMOL/L (ref 3.6–5.5)
PROT SERPL-MCNC: 8.5 G/DL (ref 6–8.2)
SODIUM SERPL-SCNC: 137 MMOL/L (ref 135–145)
TRIGL SERPL-MCNC: 407 MG/DL (ref 0–149)

## 2024-03-22 PROCEDURE — 82172 ASSAY OF APOLIPOPROTEIN: CPT

## 2024-03-22 PROCEDURE — 80053 COMPREHEN METABOLIC PANEL: CPT

## 2024-03-22 PROCEDURE — 82043 UR ALBUMIN QUANTITATIVE: CPT

## 2024-03-22 PROCEDURE — 82570 ASSAY OF URINE CREATININE: CPT

## 2024-03-22 PROCEDURE — 36415 COLL VENOUS BLD VENIPUNCTURE: CPT

## 2024-03-22 PROCEDURE — 80061 LIPID PANEL: CPT

## 2024-03-23 LAB
CREAT UR-MCNC: 8.46 MG/DL
MICROALBUMIN UR-MCNC: <1.2 MG/DL
MICROALBUMIN/CREAT UR: NORMAL MG/G (ref 0–30)

## 2024-03-24 LAB — APO B100 SERPL-MCNC: 170 MG/DL (ref 66–133)

## 2024-03-28 ENCOUNTER — OFFICE VISIT (OUTPATIENT)
Dept: VASCULAR LAB | Facility: MEDICAL CENTER | Age: 34
End: 2024-03-28
Payer: COMMERCIAL

## 2024-03-28 VITALS
HEART RATE: 78 BPM | BODY MASS INDEX: 30.81 KG/M2 | HEIGHT: 69 IN | WEIGHT: 208 LBS | SYSTOLIC BLOOD PRESSURE: 156 MMHG | DIASTOLIC BLOOD PRESSURE: 99 MMHG

## 2024-03-28 DIAGNOSIS — E78.2 MIXED HYPERLIPIDEMIA: ICD-10-CM

## 2024-03-28 DIAGNOSIS — E78.1 HYPERTRIGLYCERIDEMIA: ICD-10-CM

## 2024-03-28 DIAGNOSIS — E88.810 METABOLIC SYNDROME: ICD-10-CM

## 2024-03-28 DIAGNOSIS — I10 ELEVATED BLOOD PRESSURE READING IN OFFICE WITH DIAGNOSIS OF HYPERTENSION: ICD-10-CM

## 2024-03-28 DIAGNOSIS — I10 ESSENTIAL HYPERTENSION: ICD-10-CM

## 2024-03-28 DIAGNOSIS — F41.1 GENERALIZED ANXIETY DISORDER: ICD-10-CM

## 2024-03-28 DIAGNOSIS — I1A.0 RESISTANT HYPERTENSION: ICD-10-CM

## 2024-03-28 DIAGNOSIS — F10.10 EXCESSIVE DRINKING ALCOHOL: ICD-10-CM

## 2024-03-28 PROCEDURE — 99214 OFFICE O/P EST MOD 30 MIN: CPT

## 2024-03-28 PROCEDURE — 3080F DIAST BP >= 90 MM HG: CPT

## 2024-03-28 PROCEDURE — 3077F SYST BP >= 140 MM HG: CPT

## 2024-03-28 PROCEDURE — 99212 OFFICE O/P EST SF 10 MIN: CPT

## 2024-03-28 RX ORDER — TRIAMCINOLONE ACETONIDE 55 UG/1
2 SPRAY, METERED NASAL DAILY
COMMUNITY

## 2024-03-28 ASSESSMENT — ENCOUNTER SYMPTOMS
SHORTNESS OF BREATH: 0
NERVOUS/ANXIOUS: 1
DOUBLE VISION: 0
WHEEZING: 0
FOCAL WEAKNESS: 0
HEADACHES: 0
BLURRED VISION: 0
ABDOMINAL PAIN: 0
PALPITATIONS: 0
WEIGHT LOSS: 0
BACK PAIN: 0
NAUSEA: 0
DIARRHEA: 0
DIZZINESS: 0
EYE REDNESS: 1

## 2024-03-28 ASSESSMENT — FIBROSIS 4 INDEX: FIB4 SCORE: 0.47

## 2024-03-28 NOTE — PROGRESS NOTES
RESISTANT HTN CLINIC - FOLLOW-UP   03/28/2024    Geremias Conte is a 34 yo male seen for evaluation of resistant HTN and management.   Geremias Conte is initially referred by Teodoro Sellers P.A.-C    Subjective      Interval hx/concerns:   Last seen 02/15/2024  Doing ok,  Still having quite a bit of stress and anxiety  Not checking bps very much at home, still remains a big source of anxiety  Only taking in evening after work, and before taking his evening meds  Bps 130-163/80-90s  Taking amlodipine in am  Doxazosin and telmisartan in evening  Denies CP, SOB, palpitations or leg swelling  No TIA/CVA symptoms  Denies HA or vision changes  Is drinking daily, typically 5-6 beers a night, trying to cut down but having hard time  Follows with PCP for anxiety  Is not seeing psychiatrist any longer    HTN:  Home BP log: not checking regularly  Adherence to current HTN meds: good adherence   Lifestyle factors:  Weight Change: stable   Wt Readings from Last 3 Encounters:   03/28/24 94.3 kg (208 lb)   02/15/24 93.4 kg (206 lb)   01/10/24 92.1 kg (203 lb)      Diet pattern: common adult  Daily salt intake estimate:  High     EtOH: Yes, Details: heavier use,  3+ beers/day   Exercise habits: no regular exercise program  Perceived barriers to care: none   Pertinent HTN hx (reviewed at initial visit):   Age at HTN dx: more than >3mo ago noted very elevated 190s/?  Past HTN medications: lisinopril   HTN crises:  No prior hospitalization or crises   Interfering substances/contributing factors:  None    Hyperlipidemia:  Stable, no current concerns  Current treatment: lifestyle changes  and no current medications   Latest Reference Range & Units Most Recent   Cholesterol,Tot 100 - 199 mg/dL 292 (H)  3/28/22 10:09   Triglycerides 0 - 149 mg/dL 362 (H)  3/28/22 10:09   HDL >=40 mg/dL 40  3/28/22 10:09   LDL <100 mg/dL 180 (H)  3/28/22 10:09        Sleeping disorder/ANN: Yes, Details: heavy snoring, had PSG in past few years ago and told  "no sleep debbie   Has order for OPO but not yet completed     Current Outpatient Medications:     triamcinolone, 2 Spray, Nasal, DAILY, Taking    amLODIPine, 10 mg, Oral, QDAY, Taking    telmisartan, 80 mg, Oral, DAILY, Taking    sertraline, 50 mg, Oral, DAILY, Taking    sertraline, 100 mg, Oral, DAILY, Taking    doxazosin, 4 mg, Oral, Nightly, Taking     Social History     Tobacco Use    Smoking status: Never    Smokeless tobacco: Never   Vaping Use    Vaping Use: Never used   Substance Use Topics    Alcohol use: Yes     Alcohol/week: 7.2 oz     Types: 12 Cans of beer per week     Comment: mostly weekends (5-20beers; 12pack on avg)    Drug use: No      Review of Systems   Constitutional:  Negative for malaise/fatigue and weight loss.   HENT:  Positive for congestion.    Eyes:  Positive for redness. Negative for blurred vision and double vision.   Respiratory:  Negative for shortness of breath and wheezing.    Cardiovascular:  Negative for chest pain, palpitations and leg swelling.   Gastrointestinal:  Negative for abdominal pain, diarrhea and nausea.   Musculoskeletal:  Negative for back pain.   Neurological:  Negative for dizziness, focal weakness and headaches.   Psychiatric/Behavioral:  The patient is nervous/anxious.            Objective    Vitals:    03/28/24 0920 03/28/24 0924   BP: (!) 152/96 (!) 156/99   BP Location: Left arm Left arm   Patient Position: Sitting Sitting   BP Cuff Size: Adult Adult   Pulse: 93 78   Weight: 94.3 kg (208 lb)    Height: 1.753 m (5' 9\")      Body mass index is 30.72 kg/m².  BP Readings from Last 5 Encounters:   03/28/24 (!) 156/99   02/15/24 (!) 157/105   08/09/23 138/88   08/03/23 127/81   07/23/23 (!) 168/102      Physical Exam  Vitals reviewed.   Constitutional:       General: He is not in acute distress.     Appearance: He is not diaphoretic.   HENT:      Head: Normocephalic and atraumatic.   Eyes:      General: No scleral icterus.     Conjunctiva/sclera:      Right eye: " Right conjunctiva is injected. Exudate present.      Left eye: Left conjunctiva is injected. Exudate present.   Cardiovascular:      Rate and Rhythm: Normal rate and regular rhythm.      Heart sounds: Normal heart sounds. No murmur heard.  Pulmonary:      Effort: Pulmonary effort is normal. No respiratory distress.      Breath sounds: Normal breath sounds. No wheezing.   Musculoskeletal:         General: No swelling. Normal range of motion.   Skin:     General: Skin is warm and dry.      Coloration: Skin is not pale.   Neurological:      General: No focal deficit present.      Mental Status: He is alert and oriented to person, place, and time.      Coordination: Coordination normal.      Gait: Gait normal.   Psychiatric:         Mood and Affect: Mood normal.         Behavior: Behavior normal.        Accessory Clinical Findings:     Lab Results   Component Value Date    CHOLSTRLTOT 270 (H) 03/22/2024    LDL see below 03/22/2024    HDL 35 (A) 03/22/2024    TRIGLYCERIDE 407 (H) 03/22/2024      Lab Results   Component Value Date/Time    LIPOPROTA <6 02/03/2023 09:32 AM      Lab Results   Component Value Date/Time    APOB 170 (H) 03/22/2024 11:42 AM                  Lab Results   Component Value Date    SODIUM 137 03/22/2024    POTASSIUM 3.7 03/22/2024    CHLORIDE 99 03/22/2024    CO2 23 03/22/2024    GLUCOSE 84 03/22/2024    BUN 8 03/22/2024    CREATININE 0.59 03/22/2024      Lab Results   Component Value Date    ALDOSTERONE 16.6 06/01/2023       Latest Reference Range & Units 02/03/23 09:29 02/03/23 09:31 02/03/23 09:32   Aldos Serum ng/dL   11.1   Renin Activity ng/mL/hr  0.1    Metanephrine Plasma 0.00 - 0.49 nmol/L 0.15     Normetanephrine Plasma 0.00 - 0.89 nmol/L 0.49        Latest Reference Range & Units 02/03/23 09:32   Cortisol 0.0 - 23.0 ug/dL 6.3     Lab Results   Component Value Date    URCREAT 8.46 03/22/2024    MICROALBUR <1.2 03/22/2024    MALBCRT see below 03/22/2024    METANEPHPL 0.15 02/03/2023     Lab  Results   Component Value Date/Time    MALBCRT see below 03/22/2024 11:42 AM    MICROALBUR <1.2 03/22/2024 11:42 AM      VASCULAR IMAGING:     Echo 2014  Normal left ventricular systolic function.   Left ventricular ejection fraction is 60% to 65%.   No significant valve abnormalities.     CT abd 2016  CT Abdomen:  The liver, spleen, adrenal glands, pancreas, kidneys, and gallbladder are unremarkable.     Cardiac event 2017  NO SIGNIFICANT AV BLOCK   NO SINUS PAUSE   RARE PREMATURE VENTRICULAR COMPLEX   RARE PREMATURE ATRIAL COMPLEX    RAD 2/3/23  .  There is no evidence of renal artery stenosis.    Echo 6/2023  Prior echo 8/21/14  Normal transthoracic echocardiogram.     Zio patch 9/2023  1.  Predominant rhythm was sinus rhythm with an average heart rate of 85 bpm.   2.  No episodes of ventricular tachycardia.   3.  No episodes of supraventricular tachycardia.   4.  No episodes of atrial fibrillation.   5.  No significant pauses or high-grade AV block.   6.  Rare PACs <1.0%.   7. There were no patient triggered events/symptoms during the monitoring period.           MEDICAL DECISION-MAKING:  TODAY'S ASSESSMENT / STATUS / PLAN:  1. Essential hypertension  Referral to 24-Hour Blood Pressure Monitoring      2. Elevated blood pressure reading in office with diagnosis of hypertension  Referral to 24-Hour Blood Pressure Monitoring      3. Mixed hyperlipidemia        4. Hypertriglyceridemia        5. Metabolic syndrome        6. Resistant hypertension        7. OPAL (generalized anxiety disorder)        8. Excessive drinking alcohol               Patient Type: Primary Prevention    1. BLOOD PRESSURE CONTROL   Qualifies as Resistant HTN (RH):  Yes, Uncontrolled RH - BP above goal despite concurrent use of 3 max-tolerated meds from 3 classes   Office BP Goal ACC/AHA (2017) goal <130/80  Home BP at goal:  130-160s/80-90s rare readings  Office BP at goal:  no, elevated in office today  24h ABPM:  ordered today  Device  candidate? Yes   Contributing factors: obesity, young age of onset, fhx of HTN, MetS, ETOH use   Presumed this is paroxysmal HTN based upon hx of anxiety/panic d/o and BP spikes with anxiety, normal metaneph and thyroid   Again had long discussion on bp regimen, and importance of taking medications consistently as prescribed, and to not stop treatment. Reviewed possible long term complications of HTN, pt denies HA or vision changes.  Does endorse anxiety is a significant contributor to his elevated BP.  Recommend pt continue to follow up with PCP for anxiety management and work on stress/anxiety reduction techniques.  Pt with significant anxiety surrounding taking bps, and rarely takes them at home.  Suspect bp in better control than reported numbers, and office bps remain elevated consistent with white coat HTN.  Will obtain ABPM to help determine overall bp control and confirm white coat HTN.    Currently asymptomatic HTN urgency, no s/s of end-organ damage based upon lack of CP, SOB, visual blurring , HA, n/v, gross hematuria.  Pt informed to call 911 or go directly to ED if any of these symptoms occur as this is considered HTN emergency and require prompt attention - pt verbalized understanding of these risks and ED precautions.    Plan:   Monitoring:   - continue home BP monitoring  - monitor lytes/gfr routinely - prior to next appt    2. WORK UP FOR SECONDARY CAUSES OF RH:  Obstructive Sleep Apnea:  reports heavy snoring, no apnea, neg PSG in the past.  Wife reports somewhat worse   Renal parenchymal disease: Excluded  Renovascular HTN: Excluded  Primary Aldosteronism:  reordered today, after med changes at last visit   Thyroid Function: Excluded  Pheochromocytoma: Excluded   Cushing's:   Excluded   Other: none identified    Recommendations At This Visit: as noted in orders         3. MEDICATION USE / ADHERENCE:   Assessment: Complete  Recommendations: Instructed to Continue Taking All Medications As  Prescribed         4. HTN-MEDIATED END-ORGAN DAMAGE:  Left Ventricular Hypertrophy: Absent on  ECG Date: 10/2022  Urine Albuminuria: None on Date: 2023  Renal Function: G1-2  Ophthalmologic: Absent per patient report and/or eye specialist   Established Cardiovascular Disease: None    5. LIFESTYLE INTERVENTIONS:  (very important focus)     SMOKING:    reports that he has never smoked. He has never used smokeless tobacco.   - continued complete avoidance of all tobacco products     PHYSICAL ACTIVITY: continue healthy activity to improve CV fitness.  In general, targeting >150min/week of moderate-level activity or as much as tolerated    WEIGHT MANAGEMENT AND NUTRITION: Mediterranean style dietary approach , DASH style dietary approach , Focus on reduced sodium to <2,000mg per day , and Provide specific dietary approach handouts   - reduce etoh to 2 standard beers per day - reviewed   - needs overall wt loss with a goal of 10lbs over the next 6mo - reviewed    6. STANDARD HTN PHARMACOTHERAPY:  - continue telmisartan 80mg daily  for improved BP control potency and true 24h BP coverage   - continue amlodipine 10mg daily     7. ADDITIONAL AGENTS   - consider metoprolol 25mg ER daily based upon zio patch   - continue doxazosin 4mg qhs    LIPID MANAGEMENT:   Qualifies for Statin Therapy Based on 2018 ACC/AHA Guidelines: no, Primary Prevention- 20-38yo  The ASCVD Risk score (Phyllis DK, et al., 2019) failed to calculate., N/A  Major ASCVD events: None    High-risk conditions: N/A  Risk-enhancers: Persistently elevated LDL-C >159, Persistently elevated trigs >174, and apoB >129 = severe primary HLD  Currently on Statin: No  Tx goals: reduce LDL-C 30-49% and LDL-C <100 (consider non-HDL-C <130, apoB <90)  At goal? No, 3/2024  Long discussion today on cholesterol, specifically elevated TGs.  Increased from last panel.  Pt endorses increased ETOH use, and poor diet.  Denies s/s of pancreatitis, and educated red flags.  Stressed  importance of changing lifestyle, including decreasing/stopping ETOH, and improving overall diet.  Encouraged pt to follow up with PCP/psych to help with contributing factors.    Plan:   - reinforced ongoing TLC measures as noted and recommend decrease/stop ETOH use  - monitor labs - order at next appt  Meds:  none at this time     GLYCEMIA MANAGEMENT:  Normal  # Metabolic syndrome - 4/5 components   - higher components equates to higher system-wide inflammation and ASCVD and T2D risk   - estimated 2-fold increase ASCVD events for both primary/secondary prevention   - estimated 4-6-fold increase in development of full-blown T2D  Plan:  - focus on healthy macromolecules choices and overall reduced kcal diet, such as Med diet approach  - focus on body weight reduction of 5-7% as weight loss goal      ANTIPLATELET/ANTICOAGULANT THERAPY:  not indicated     OTHER ISSUES:     # OPAL - a barrier for HTN, certainly.  He feels this is stabilizing but he does recall a lot of anxiety when he takes his actual BP.    - again discussed not worrying about the values, but just recording them for our review - enlist wife help with taking bps.  - f/u with PCP to review further treatment    # palpitations - normal zio patch 9/2023  Provided reassurance of low prob of ischemic HD or pathologic arrhythmia   Prior normal echo     Studies Ordered At This Visit: ABPM  Blood Work to Be Obtained Prior to Next Visit: as noted above   Disposition:  6-8 weeks to review bp and ABPM      SUHAIL Crawford.  [facility]   [facility]   [phone]

## 2024-04-17 DIAGNOSIS — F41.1 GENERALIZED ANXIETY DISORDER: ICD-10-CM

## 2024-04-18 RX ORDER — ALPRAZOLAM 0.5 MG/1
0.5 TABLET ORAL
Qty: 20 TABLET | Refills: 0 | Status: SHIPPED | OUTPATIENT
Start: 2024-04-18 | End: 2024-05-18

## 2024-05-01 ENCOUNTER — NON-PROVIDER VISIT (OUTPATIENT)
Dept: VASCULAR LAB | Facility: MEDICAL CENTER | Age: 34
End: 2024-05-01
Payer: COMMERCIAL

## 2024-05-01 PROCEDURE — 93790 AMBL BP MNTR W/SW I&R: CPT | Performed by: INTERNAL MEDICINE

## 2024-05-01 NOTE — NON-PROVIDER
ABPM placed on 05/01/2024 at 9:49AM.    Patient educated on monitor care and procedure.    Patient to return device on 05/02/2024 before 1700.    Verbal understanding provided.   N[facility] Gillespie [facility]  [facility]  Ph. [phone]  Fx. [phone]

## 2024-05-04 NOTE — PROGRESS NOTES
Ambulatory blood pressure monitor results reviewed  Full report under media tab  Reasonable data acquisition    Mean daytime: 130/89 consistent with suboptimally controlled out of office blood pressure    Nocturnal dip: Appears blunted    Clinical correlation needed.  Will discuss with patient at follow-up visit    Michael Bloch, MD  [facility]

## 2024-05-10 DIAGNOSIS — I1A.0 RESISTANT HYPERTENSION: ICD-10-CM

## 2024-05-10 RX ORDER — DOXAZOSIN MESYLATE 4 MG/1
4 TABLET ORAL NIGHTLY
Qty: 90 TABLET | Refills: 3 | Status: SHIPPED | OUTPATIENT
Start: 2024-05-10

## 2024-05-17 ENCOUNTER — OFFICE VISIT (OUTPATIENT)
Dept: VASCULAR LAB | Facility: MEDICAL CENTER | Age: 34
End: 2024-05-17
Attending: INTERNAL MEDICINE
Payer: COMMERCIAL

## 2024-05-17 VITALS
HEART RATE: 84 BPM | SYSTOLIC BLOOD PRESSURE: 152 MMHG | HEIGHT: 69 IN | WEIGHT: 213 LBS | DIASTOLIC BLOOD PRESSURE: 101 MMHG | BODY MASS INDEX: 31.55 KG/M2

## 2024-05-17 DIAGNOSIS — R53.83 FATIGUE, UNSPECIFIED TYPE: ICD-10-CM

## 2024-05-17 DIAGNOSIS — R06.83 SNORING: ICD-10-CM

## 2024-05-17 DIAGNOSIS — I1A.0 RESISTANT HYPERTENSION: ICD-10-CM

## 2024-05-17 PROCEDURE — 3080F DIAST BP >= 90 MM HG: CPT

## 2024-05-17 PROCEDURE — 99214 OFFICE O/P EST MOD 30 MIN: CPT

## 2024-05-17 PROCEDURE — 3077F SYST BP >= 140 MM HG: CPT

## 2024-05-17 RX ORDER — SPIRONOLACTONE 25 MG/1
25 TABLET ORAL DAILY
Qty: 90 TABLET | Refills: 3 | Status: SHIPPED | OUTPATIENT
Start: 2024-05-17

## 2024-05-17 ASSESSMENT — ENCOUNTER SYMPTOMS
WHEEZING: 0
BLURRED VISION: 0
DIZZINESS: 0
PALPITATIONS: 0
SHORTNESS OF BREATH: 0
DIARRHEA: 0
HEADACHES: 0
ABDOMINAL PAIN: 0
WEIGHT LOSS: 0
BACK PAIN: 0
DOUBLE VISION: 0
FOCAL WEAKNESS: 0
NAUSEA: 0

## 2024-05-17 ASSESSMENT — FIBROSIS 4 INDEX: FIB4 SCORE: 0.47

## 2024-05-17 NOTE — PROGRESS NOTES
RESISTANT HTN CLINIC - FOLLOW-UP   05/17/2024    Geremias Conte is a 34 yo male seen for evaluation of resistant HTN and management.   Geremias Conte is initially referred by Teodoro Sellers P.A.-C    Subjective      Interval hx/concerns:   Last seen 3/28/2024  Doing ok,  Still having quite a bit of stress and anxiety  Not checking bps  at home, still remains a big source of anxiety  Did ABPM  Remains on amlodipine, telmisartan and doxazosin  Denies CP, SOB, palpitations or leg swelling  No TIA/CVA symptoms  Denies HA or vision changes  Still drinking daily, typically 5-6 beers a night, trying to cut down but having hard time  Follows with PCP for anxiety  Is not seeing psychiatrist any longer    HTN:  Home BP log: not checking   Adherence to current HTN meds: good adherence   Lifestyle factors:  Weight Change: stable   Wt Readings from Last 3 Encounters:   05/17/24 96.6 kg (213 lb)   03/28/24 94.3 kg (208 lb)   02/15/24 93.4 kg (206 lb)      Diet pattern: common adult  Daily salt intake estimate:  High     EtOH: Yes, Details: heavier use,  3+ beers/day   Exercise habits: no regular exercise program, just got a bike, and has been riding  more throughout week  Perceived barriers to care: none   Pertinent HTN hx (reviewed at initial visit):   Age at HTN dx: more than >3mo ago noted very elevated 190s/?  Past HTN medications: lisinopril   HTN crises:  No prior hospitalization or crises   Interfering substances/contributing factors:  None    Hyperlipidemia:  Stable, no current concerns  Current treatment: lifestyle changes  and no current medications   Latest Reference Range & Units Most Recent   Cholesterol,Tot 100 - 199 mg/dL 292 (H)  3/28/22 10:09   Triglycerides 0 - 149 mg/dL 362 (H)  3/28/22 10:09   HDL >=40 mg/dL 40  3/28/22 10:09   LDL <100 mg/dL 180 (H)  3/28/22 10:09        Sleeping disorder/ANN: Yes, Details: heavy snoring, had PSG in past few years ago and told no sleep apena   Has order for OPO but not yet  "completed  Reports wife says snoring is worse,   Waking fatigued and not rested in am       Current Outpatient Medications:     spironolactone, 25 mg, Oral, DAILY    doxazosin, 4 mg, Oral, Nightly, Taking    ALPRAZolam, 0.5 mg, Oral, QHS PRN, Taking    triamcinolone, 2 Spray, Nasal, DAILY, Taking    amLODIPine, 10 mg, Oral, QDAY, Taking    telmisartan, 80 mg, Oral, DAILY, Taking    sertraline, 50 mg, Oral, DAILY, Taking    sertraline, 100 mg, Oral, DAILY, Taking     Social History     Tobacco Use    Smoking status: Never    Smokeless tobacco: Never   Vaping Use    Vaping status: Never Used   Substance Use Topics    Alcohol use: Yes     Alcohol/week: 7.2 oz     Types: 12 Cans of beer per week     Comment: mostly weekends (5-20beers; 12pack on avg)    Drug use: No      Review of Systems   Constitutional:  Negative for malaise/fatigue and weight loss.   HENT:  Positive for congestion.    Eyes:  Negative for blurred vision and double vision.   Respiratory:  Negative for shortness of breath and wheezing.    Cardiovascular:  Negative for chest pain, palpitations and leg swelling.   Gastrointestinal:  Negative for abdominal pain, diarrhea and nausea.   Musculoskeletal:  Negative for back pain.   Neurological:  Negative for dizziness, focal weakness and headaches.           Objective    Vitals:    05/17/24 0928 05/17/24 0932   BP: (!) 155/102 (!) 152/101   BP Location: Left arm Left arm   Patient Position: Sitting Sitting   BP Cuff Size: Adult long Adult long   Pulse: 89 84   Weight: 96.6 kg (213 lb)    Height: 1.753 m (5' 9\")      Body mass index is 31.45 kg/m².  BP Readings from Last 5 Encounters:   05/17/24 (!) 152/101   03/28/24 (!) 156/99   02/15/24 (!) 157/105   08/09/23 138/88   08/03/23 127/81      Physical Exam  Vitals reviewed.   Constitutional:       General: He is not in acute distress.     Appearance: He is not diaphoretic.   HENT:      Head: Normocephalic and atraumatic.   Eyes:      General: No scleral " icterus.     Conjunctiva/sclera:      Right eye: Right conjunctiva is injected. Exudate present.      Left eye: Left conjunctiva is injected. Exudate present.   Cardiovascular:      Rate and Rhythm: Normal rate and regular rhythm.      Heart sounds: Normal heart sounds. No murmur heard.  Pulmonary:      Effort: Pulmonary effort is normal. No respiratory distress.      Breath sounds: Normal breath sounds. No wheezing.   Musculoskeletal:         General: No swelling. Normal range of motion.   Skin:     General: Skin is warm and dry.      Coloration: Skin is not pale.   Neurological:      General: No focal deficit present.      Mental Status: He is alert and oriented to person, place, and time.      Coordination: Coordination normal.      Gait: Gait normal.   Psychiatric:         Mood and Affect: Mood normal.         Behavior: Behavior normal.        Accessory Clinical Findings:     Lab Results   Component Value Date    CHOLSTRLTOT 270 (H) 03/22/2024    LDL see below 03/22/2024    HDL 35 (A) 03/22/2024    TRIGLYCERIDE 407 (H) 03/22/2024      Lab Results   Component Value Date/Time    LIPOPROTA <6 02/03/2023 09:32 AM      Lab Results   Component Value Date/Time    APOB 170 (H) 03/22/2024 11:42 AM                  Lab Results   Component Value Date    SODIUM 137 03/22/2024    POTASSIUM 3.7 03/22/2024    CHLORIDE 99 03/22/2024    CO2 23 03/22/2024    GLUCOSE 84 03/22/2024    BUN 8 03/22/2024    CREATININE 0.59 03/22/2024      Lab Results   Component Value Date    ALDOSTERONE 16.6 06/01/2023       Latest Reference Range & Units 02/03/23 09:29 02/03/23 09:31 02/03/23 09:32   Aldos Serum ng/dL   11.1   Renin Activity ng/mL/hr  0.1    Metanephrine Plasma 0.00 - 0.49 nmol/L 0.15     Normetanephrine Plasma 0.00 - 0.89 nmol/L 0.49        Latest Reference Range & Units 02/03/23 09:32   Cortisol 0.0 - 23.0 ug/dL 6.3     Lab Results   Component Value Date    URCREAT 8.46 03/22/2024    MICROALBUR <1.2 03/22/2024    MALBCRT see below  03/22/2024    METANEPHPL 0.15 02/03/2023     Lab Results   Component Value Date/Time    MALBCRT see below 03/22/2024 11:42 AM    MICROALBUR <1.2 03/22/2024 11:42 AM      VASCULAR IMAGING:     Echo 2014  Normal left ventricular systolic function.   Left ventricular ejection fraction is 60% to 65%.   No significant valve abnormalities.     CT abd 2016  CT Abdomen:  The liver, spleen, adrenal glands, pancreas, kidneys, and gallbladder are unremarkable.     Cardiac event 2017  NO SIGNIFICANT AV BLOCK   NO SINUS PAUSE   RARE PREMATURE VENTRICULAR COMPLEX   RARE PREMATURE ATRIAL COMPLEX    RAD 2/3/23  .  There is no evidence of renal artery stenosis.    Echo 6/2023  Prior echo 8/21/14  Normal transthoracic echocardiogram.     Zio patch 9/2023  1.  Predominant rhythm was sinus rhythm with an average heart rate of 85 bpm.   2.  No episodes of ventricular tachycardia.   3.  No episodes of supraventricular tachycardia.   4.  No episodes of atrial fibrillation.   5.  No significant pauses or high-grade AV block.   6.  Rare PACs <1.0%.   7. There were no patient triggered events/symptoms during the monitoring period.     ABPM 5/2024  Mean daytime: 130/89 consistent with suboptimally controlled out of office blood pressure     Nocturnal dip: Appears blunted          MEDICAL DECISION-MAKING:  TODAY'S ASSESSMENT / STATUS / PLAN:  1. Resistant hypertension  spironolactone (ALDACTONE) 25 MG Tab    Basic Metabolic Panel      2. Snoring  Referral to Pulmonary and Sleep Medicine      3. Fatigue, unspecified type  Referral to Pulmonary and Sleep Medicine               Patient Type: Primary Prevention    1. BLOOD PRESSURE CONTROL   Qualifies as Resistant HTN (RH):  Yes, Uncontrolled RH - BP above goal despite concurrent use of 3 max-tolerated meds from 3 classes   Office BP Goal ACC/AHA (2017) goal <130/80  Home BP at goal:  130-160s/80-90s rare readings  Office BP at goal:  no, elevated in office today  24h ABPM:  5/2024:  Mean daytime:  130/89 consistent with suboptimally controlled out of office blood pressure, Nocturnal dip: Appears blunted  Device candidate? Yes   Contributing factors: obesity, young age of onset, fhx of HTN, MetS, ETOH use   Presumed this is paroxysmal HTN based upon hx of anxiety/panic d/o and BP spikes with anxiety, normal metaneph and thyroid   Again had long discussion on bp regimen, and importance of taking medications consistently as prescribed, and to not stop treatment. Reviewed possible long term complications of HTN, pt denies HA or vision changes.  Does endorse anxiety is a significant contributor to his elevated BP.  Recommend pt continue to follow up with PCP for anxiety management and work on stress/anxiety reduction techniques.  Pt with significant anxiety surrounding taking bps, and rarely takes them at home.  Suspect bp in better control than reported numbers, and office bps remain elevated consistent with white coat HTN.    ABPM 5/2024 130/89 mean BP, remains above goal, but is improving overall.   Significantly elevated in office consistent with white coat HTN.    Long discussion today on bp control, and other confounding factors which affect BPs - ANN, ETOH use, anxiety.  Pt with reported worsening snoring (per wife) and ongoing fatigue, pt agreeable to referral to sleep medicine for formal eval, referral placed.  Encouraged pt to continue to work with PCP on anxiety, and decreasing ETOH use.    Currently asymptomatic HTN urgency, no s/s of end-organ damage based upon lack of CP, SOB, visual blurring , HA, n/v, gross hematuria.  Pt informed to call 911 or go directly to ED if any of these symptoms occur as this is considered HTN emergency and require prompt attention - pt verbalized understanding of these risks and ED precautions.    Plan:   Monitoring:   - continue home BP monitoring  - monitor lytes/gfr routinely - prior to next appt    2. WORK UP FOR SECONDARY CAUSES OF RH:  Obstructive Sleep Apnea:   reports heavy snoring, no apnea, neg PSG in the past.  Wife reports somewhat worse   - pending eval by sleep med, referral today  Renal parenchymal disease: Excluded  Renovascular HTN: Excluded  Primary Aldosteronism: Excluded  Thyroid Function: Excluded  Pheochromocytoma: Excluded   Cushing's:   Excluded   Other: none identified    Recommendations At This Visit: as noted in orders         3. MEDICATION USE / ADHERENCE:   Assessment: Complete  Recommendations: Instructed to Continue Taking All Medications As Prescribed         4. HTN-MEDIATED END-ORGAN DAMAGE:  Left Ventricular Hypertrophy: Absent on  ECG Date: 10/2022  Urine Albuminuria: None on Date: 2023  Renal Function: G1-2  Ophthalmologic: Absent per patient report and/or eye specialist   Established Cardiovascular Disease: None    5. LIFESTYLE INTERVENTIONS:  (very important focus)     SMOKING:    reports that he has never smoked. He has never used smokeless tobacco.   - continued complete avoidance of all tobacco products     PHYSICAL ACTIVITY: continue healthy activity to improve CV fitness.  In general, targeting >150min/week of moderate-level activity or as much as tolerated    WEIGHT MANAGEMENT AND NUTRITION: Mediterranean style dietary approach , DASH style dietary approach , Focus on reduced sodium to <2,000mg per day , and Provide specific dietary approach handouts   - reduce etoh to 2 standard beers per day - reviewed   - needs overall wt loss with a goal of 10lbs over the next 6mo - reviewed    6. STANDARD HTN PHARMACOTHERAPY:  - continue telmisartan 80mg daily  for improved BP control potency and true 24h BP coverage   - continue amlodipine 10mg daily     7. ADDITIONAL AGENTS   - consider metoprolol 25mg ER/vs clonidine in future to decrease sympathetic drive    - continue doxazosin 4mg qhs  - START spironolactone 25mg daily - monitor lytes/GFR in 3-4 weeks    LIPID MANAGEMENT:   Qualifies for Statin Therapy Based on 2018 ACC/AHA Guidelines: no,  Primary Prevention- 20-40yo  The ASCVD Risk score (Phyllis KEVIN, et al., 2019) failed to calculate., N/A  Major ASCVD events: None    High-risk conditions: N/A  Risk-enhancers: Persistently elevated LDL-C >159, Persistently elevated trigs >174, and apoB >129 = severe primary HLD  Currently on Statin: No  Tx goals: reduce LDL-C 30-49% and LDL-C <100 (consider non-HDL-C <130, apoB <90)  At goal? No, 3/2024  As previously discussed- cholesterol, specifically elevated TGs.  Increased from last panel.  Pt endorses increased ETOH use, and poor diet.  Denies s/s of pancreatitis, and educated red flags.  Stressed importance of changing lifestyle, including decreasing/stopping ETOH, and improving overall diet.  Encouraged pt to follow up with PCP/psych to help with contributing factors.    Plan:   - reinforced ongoing TLC measures as noted and recommend decrease/stop ETOH use  - monitor labs - order at next appt  Meds:  none at this time     GLYCEMIA MANAGEMENT:  Normal  # Metabolic syndrome - 4/5 components   - higher components equates to higher system-wide inflammation and ASCVD and T2D risk   - estimated 2-fold increase ASCVD events for both primary/secondary prevention   - estimated 4-6-fold increase in development of full-blown T2D  Plan:  - focus on healthy macromolecules choices and overall reduced kcal diet, such as Med diet approach  - focus on body weight reduction of 5-7% as weight loss goal      ANTIPLATELET/ANTICOAGULANT THERAPY:  not indicated     OTHER ISSUES:     # OPAL - a barrier for HTN, certainly.  He feels this is stabilizing but he does recall a lot of anxiety when he takes his actual BP.    - again discussed not worrying about the values, but just recording them for our review - enlist wife help with taking bps.  - f/u with PCP to review further treatment    # palpitations - normal zio patch 9/2023  Provided reassurance of low prob of ischemic HD or pathologic arrhythmia   Prior normal echo      #snoring/fatigue - ongoing, worsening, prior home study completed.  Now waking fatigued and somnolence during day worsening.  Never had formal sleep study eval.  Reviewed ANN can be factor in difficult to control BPs.    Agreeable to referral to eval.  Referral placed.      Studies Ordered At This Visit: none  Blood Work to Be Obtained Prior to Next Visit: DEION in 3-4 weeks   Disposition:  8 weeks to review bp and labs      SUHAIL Crawford.  [facility]   [facility]   [phone]

## 2024-06-28 ENCOUNTER — TELEPHONE (OUTPATIENT)
Dept: HEALTH INFORMATION MANAGEMENT | Facility: OTHER | Age: 34
End: 2024-06-28
Payer: COMMERCIAL

## 2024-07-01 DIAGNOSIS — I1A.0 RESISTANT HYPERTENSION: ICD-10-CM

## 2024-07-01 DIAGNOSIS — F41.1 GENERALIZED ANXIETY DISORDER: ICD-10-CM

## 2024-07-01 DIAGNOSIS — I10 ESSENTIAL HYPERTENSION: ICD-10-CM

## 2024-07-01 RX ORDER — AMLODIPINE BESYLATE 10 MG/1
10 TABLET ORAL
Qty: 90 TABLET | Refills: 3 | Status: SHIPPED | OUTPATIENT
Start: 2024-07-01

## 2024-07-01 RX ORDER — DOXAZOSIN MESYLATE 4 MG/1
4 TABLET ORAL NIGHTLY
Qty: 90 TABLET | Refills: 3 | Status: SHIPPED | OUTPATIENT
Start: 2024-07-01

## 2024-07-01 RX ORDER — TELMISARTAN 80 MG/1
80 TABLET ORAL DAILY
Qty: 90 TABLET | Refills: 3 | Status: SHIPPED | OUTPATIENT
Start: 2024-07-01

## 2024-07-01 RX ORDER — SERTRALINE HYDROCHLORIDE 100 MG/1
100 TABLET, FILM COATED ORAL DAILY
Qty: 90 TABLET | Refills: 2 | Status: SHIPPED | OUTPATIENT
Start: 2024-07-01 | End: 2025-03-28

## 2024-07-16 ENCOUNTER — DOCUMENTATION (OUTPATIENT)
Dept: VASCULAR LAB | Facility: MEDICAL CENTER | Age: 34
End: 2024-07-16
Payer: COMMERCIAL

## 2024-07-18 ENCOUNTER — HOSPITAL ENCOUNTER (OUTPATIENT)
Dept: LAB | Facility: MEDICAL CENTER | Age: 34
End: 2024-07-18
Payer: COMMERCIAL

## 2024-07-18 DIAGNOSIS — I1A.0 RESISTANT HYPERTENSION: ICD-10-CM

## 2024-07-18 LAB
ANION GAP SERPL CALC-SCNC: 12 MMOL/L (ref 7–16)
BUN SERPL-MCNC: 11 MG/DL (ref 8–22)
CALCIUM SERPL-MCNC: 9.1 MG/DL (ref 8.5–10.5)
CHLORIDE SERPL-SCNC: 101 MMOL/L (ref 96–112)
CO2 SERPL-SCNC: 23 MMOL/L (ref 20–33)
CREAT SERPL-MCNC: 0.77 MG/DL (ref 0.5–1.4)
GFR SERPLBLD CREATININE-BSD FMLA CKD-EPI: 122 ML/MIN/1.73 M 2
GLUCOSE SERPL-MCNC: 93 MG/DL (ref 65–99)
POTASSIUM SERPL-SCNC: 4.5 MMOL/L (ref 3.6–5.5)
SODIUM SERPL-SCNC: 136 MMOL/L (ref 135–145)

## 2024-07-18 PROCEDURE — 80048 BASIC METABOLIC PNL TOTAL CA: CPT

## 2024-07-18 PROCEDURE — 36415 COLL VENOUS BLD VENIPUNCTURE: CPT

## 2024-07-25 ENCOUNTER — APPOINTMENT (OUTPATIENT)
Dept: VASCULAR LAB | Facility: MEDICAL CENTER | Age: 34
End: 2024-07-25
Payer: COMMERCIAL

## 2024-09-20 DIAGNOSIS — I10 ESSENTIAL HYPERTENSION: ICD-10-CM

## 2024-09-20 RX ORDER — AMLODIPINE BESYLATE 10 MG/1
10 TABLET ORAL
Qty: 90 TABLET | Refills: 3 | Status: SHIPPED | OUTPATIENT
Start: 2024-09-20

## 2024-10-09 ENCOUNTER — TELEPHONE (OUTPATIENT)
Dept: CARDIOLOGY | Facility: MEDICAL CENTER | Age: 34
End: 2024-10-09
Payer: COMMERCIAL

## 2024-10-18 ENCOUNTER — TELEPHONE (OUTPATIENT)
Dept: CARDIOLOGY | Facility: MEDICAL CENTER | Age: 34
End: 2024-10-18
Payer: COMMERCIAL

## 2024-10-24 ENCOUNTER — APPOINTMENT (OUTPATIENT)
Dept: CARDIOLOGY | Facility: MEDICAL CENTER | Age: 34
End: 2024-10-24
Attending: INTERNAL MEDICINE
Payer: COMMERCIAL

## 2025-05-12 DIAGNOSIS — I1A.0 RESISTANT HYPERTENSION: ICD-10-CM

## 2025-05-12 DIAGNOSIS — I10 ESSENTIAL HYPERTENSION: ICD-10-CM

## 2025-05-12 RX ORDER — TELMISARTAN 80 MG/1
80 TABLET ORAL DAILY
Qty: 90 TABLET | Refills: 0 | Status: SHIPPED | OUTPATIENT
Start: 2025-05-12

## 2025-05-12 RX ORDER — DOXAZOSIN 4 MG/1
4 TABLET ORAL NIGHTLY
Qty: 90 TABLET | Refills: 0 | Status: SHIPPED | OUTPATIENT
Start: 2025-05-12

## 2025-05-12 RX ORDER — AMLODIPINE BESYLATE 10 MG/1
10 TABLET ORAL
Qty: 90 TABLET | Refills: 0 | Status: SHIPPED | OUTPATIENT
Start: 2025-05-12

## 2025-07-21 ENCOUNTER — OFFICE VISIT (OUTPATIENT)
Dept: MEDICAL GROUP | Facility: MEDICAL CENTER | Age: 35
End: 2025-07-21
Payer: COMMERCIAL

## 2025-07-21 ENCOUNTER — HOSPITAL ENCOUNTER (OUTPATIENT)
Facility: MEDICAL CENTER | Age: 35
End: 2025-07-21
Attending: PHYSICIAN ASSISTANT
Payer: COMMERCIAL

## 2025-07-21 VITALS
DIASTOLIC BLOOD PRESSURE: 80 MMHG | HEIGHT: 70 IN | BODY MASS INDEX: 29.41 KG/M2 | SYSTOLIC BLOOD PRESSURE: 160 MMHG | WEIGHT: 205.4 LBS | TEMPERATURE: 98 F | OXYGEN SATURATION: 96 % | HEART RATE: 98 BPM

## 2025-07-21 DIAGNOSIS — R09.81 NASAL CONGESTION: ICD-10-CM

## 2025-07-21 DIAGNOSIS — Z00.00 PREVENTATIVE HEALTH CARE: ICD-10-CM

## 2025-07-21 DIAGNOSIS — Z79.899 CHRONIC USE OF BENZODIAZEPINE FOR THERAPEUTIC PURPOSE: ICD-10-CM

## 2025-07-21 DIAGNOSIS — J30.2 SEASONAL ALLERGIES: ICD-10-CM

## 2025-07-21 DIAGNOSIS — I1A.0 RESISTANT HYPERTENSION: ICD-10-CM

## 2025-07-21 DIAGNOSIS — F41.1 GENERALIZED ANXIETY DISORDER: ICD-10-CM

## 2025-07-21 PROBLEM — E88.810 METABOLIC SYNDROME: Status: RESOLVED | Noted: 2023-01-06 | Resolved: 2025-07-21

## 2025-07-21 PROCEDURE — 80307 DRUG TEST PRSMV CHEM ANLYZR: CPT

## 2025-07-21 PROCEDURE — 99214 OFFICE O/P EST MOD 30 MIN: CPT | Performed by: PHYSICIAN ASSISTANT

## 2025-07-21 PROCEDURE — 3077F SYST BP >= 140 MM HG: CPT | Performed by: PHYSICIAN ASSISTANT

## 2025-07-21 PROCEDURE — 3079F DIAST BP 80-89 MM HG: CPT | Performed by: PHYSICIAN ASSISTANT

## 2025-07-21 RX ORDER — IPRATROPIUM BROMIDE 42 UG/1
1 SPRAY, METERED NASAL 3 TIMES DAILY
COMMUNITY
Start: 2025-06-02 | End: 2025-07-21

## 2025-07-21 RX ORDER — SPIRONOLACTONE 25 MG/1
25 TABLET ORAL DAILY
Qty: 90 TABLET | Refills: 3 | Status: SHIPPED | OUTPATIENT
Start: 2025-07-21

## 2025-07-21 RX ORDER — PREDNISONE 20 MG/1
TABLET ORAL
Qty: 18 TABLET | Refills: 0 | Status: SHIPPED | OUTPATIENT
Start: 2025-07-21 | End: 2025-07-30

## 2025-07-21 RX ORDER — MONTELUKAST SODIUM 10 MG/1
10 TABLET ORAL DAILY
COMMUNITY
Start: 2025-06-02 | End: 2025-07-21

## 2025-07-21 RX ORDER — FLUTICASONE PROPIONATE 50 MCG
1 SPRAY, SUSPENSION (ML) NASAL DAILY
Qty: 16 G | Refills: 5 | Status: SHIPPED | OUTPATIENT
Start: 2025-07-21 | End: 2025-08-20

## 2025-07-21 RX ORDER — ALPRAZOLAM 0.5 MG
0.5 TABLET ORAL NIGHTLY PRN
Qty: 30 TABLET | Refills: 1 | Status: SHIPPED | OUTPATIENT
Start: 2025-07-21 | End: 2025-08-20

## 2025-07-21 ASSESSMENT — PATIENT HEALTH QUESTIONNAIRE - PHQ9: CLINICAL INTERPRETATION OF PHQ2 SCORE: 0

## 2025-07-21 NOTE — PROGRESS NOTES
"Subjective:     History of Present Illness  The patient presents for anxiety, hypertension, and nasal congestion. He is accompanied by his wife, Nissa.    He has been experiencing severe anxiety over the past few days, which has led to daily panic attacks. He is seeking a renewal of his Xanax prescription, which he has not had for some time.    Blood pressure readings have been elevated recently, although he is uncertain if this is due to his anxiety. He is currently on amlodipine, doxazosin, and telmisartan for blood pressure management. He was previously prescribed spironolactone by a vascular specialist but has not been taking it. He is attempting to schedule another appointment with the vascular specialist.    He also reports difficulty breathing due to nasal congestion. He has tried various treatments, including nasal sprays and rinses, but only Zicam nasal spray has provided relief. He uses this spray frequently as without it, he struggles to breathe. He has previously used Nasacort nasal spray.      Current medicines (including changes today)  Current Medications[1]  He  has a past medical history of Anxiety (8/8/2014) and Inappropriate sinus tachycardia (HCC) (8/8/2014).    ROS   No chest pain, no shortness of breath, no abdominal pain  Positive ROS as per HPI.  All other systems reviewed and are negative.     Objective:     BP (!) 160/80 (BP Location: Left arm, Patient Position: Sitting, BP Cuff Size: Adult)   Pulse 98   Temp 36.7 °C (98 °F) (Temporal)   Ht 1.775 m (5' 9.88\")   Wt 93.2 kg (205 lb 6.4 oz)   SpO2 96%  Body mass index is 29.57 kg/m².   Physical Exam    Constitutional: Alert, no distress.  Skin: Warm, dry, good turgor, no rashes in visible areas.  Eye: Equal, round and reactive, conjunctiva clear, lids normal.  ENMT: Lips without lesions, good dentition, oropharynx clear.  Neck: Trachea midline, no masses, no thyromegaly.   Psych: Alert and oriented x3, normal affect and " mood.      Results          Assessment and Plan:   The following treatment plan was discussed    Assessment & Plan  1. Anxiety.  Chronic.  Uncontrolled.  - Reports experiencing severe anxiety and daily panic attacks over the last few days.  - Prescription for Xanax 0.5 mg, 30 tablets with one refill, will be sent to pharmacy.  - Urine sample will be collected for testing, and a controlled substance agreement has been signed.  - PDMP checked and is as expected.    2.  Resistant hypertension.  - Blood pressure is elevated today.  - Currently taking amlodipine, doxazosin, and telmisartan; not taking spironolactone.  - Prescription for spironolactone will be renewed and sent to pharmacy.  - Advised to follow up with vascular specialist.    3.  Rebound nasal congestion.  - Reports chronic nasal congestion and reliance on Afrin based nasal spray.  - Tapering course of prednisone prescribed: 60 mg for 3 days, 40 mg for 3 days, and 20 mg for 3 days.  - Flonase nasal spray prescribed: one spray per nostril twice daily.  - Referral to ENT specialist will be considered if unable to discontinue Afrin.    4. Preventative care.  - Due for labs.  - Labs ordered to check cholesterol, A1c, liver function, kidney function, complete blood count, and thyroid level.      ORDERS:  1. Seasonal allergies      2. OPAL (generalized anxiety disorder)    - ALPRAZolam (XANAX) 0.5 MG Tab; Take 1 Tablet by mouth at bedtime as needed for Anxiety for up to 30 days.  Dispense: 30 Tablet; Refill: 1    3. Chronic use of benzodiazepine for therapeutic purpose    - Controlled Substance Treatment Agreement  - URINE DRUG SCREEN W/CONF (SEND OUT); Future    4. Resistant hypertension    - spironolactone (ALDACTONE) 25 MG Tab; Take 1 Tablet by mouth every day.  Dispense: 90 Tablet; Refill: 3    5. Nasal congestion    - predniSONE (DELTASONE) 20 MG Tab; Take 3 Tablets by mouth every day for 3 days, THEN 2 Tablets every day for 3 days, THEN 1 Tablet every day  for 3 days.  Dispense: 18 Tablet; Refill: 0  - fluticasone (FLONASE) 50 MCG/ACT nasal spray; Administer 1 Spray into affected nostril(S) every day for 30 days.  Dispense: 16 g; Refill: 5    6. Preventative health care    - CBC WITHOUT DIFFERENTIAL; Future  - Comp Metabolic Panel; Future  - HEMOGLOBIN A1C; Future  - Lipid Profile; Future  - TSH WITH REFLEX TO FT4; Future    () Today's E/M visit is associated with medical care services that serve as the continuing focal point for all needed health care services and/or with medical care services that are part of ongoing care related to a patient's single, serious condition or a complex condition: This includes furnishing services to patients on an ongoing basis that result in care that is personalized to the patient. The services result in a comprehensive, longitudinal, and continuous relationship with the patient and involve delivery of team-based care that is accessible, coordinated with other practitioners and providers, and integrated with the broader health care landscape.      Please note that this dictation was created using voice recognition software. I have made every reasonable attempt to correct obvious errors, but I expect that there are errors of grammar and possibly content that I did not discover before finalizing the note.      Attestation      Verbal consent was acquired by the patient to use iGuidersilot ambient listening note generation during this visit Yes                  [1]   Current Outpatient Medications   Medication Sig Dispense Refill    spironolactone (ALDACTONE) 25 MG Tab Take 1 Tablet by mouth every day. 90 Tablet 3    ALPRAZolam (XANAX) 0.5 MG Tab Take 1 Tablet by mouth at bedtime as needed for Anxiety for up to 30 days. 30 Tablet 1    predniSONE (DELTASONE) 20 MG Tab Take 3 Tablets by mouth every day for 3 days, THEN 2 Tablets every day for 3 days, THEN 1 Tablet every day for 3 days. 18 Tablet 0    fluticasone (FLONASE) 50 MCG/ACT  nasal spray Administer 1 Spray into affected nostril(S) every day for 30 days. 16 g 5    amLODIPine (NORVASC) 10 MG Tab Take 1 Tablet by mouth every day. Overdue for vascular follow up, needs appt for further refills.  Call [phone] to schedule. 90 Tablet 0    telmisartan (MICARDIS) 80 MG Tab Take 1 Tablet by mouth every day. Overdue for vascular follow up, needs appt for further refills.  Call [phone] to schedule. 90 Tablet 0    doxazosin (CARDURA) 4 MG Tab Take 1 Tablet by mouth every evening. Overdue for vascular follow up, needs appt for further refills.  Call [phone] to schedule. 90 Tablet 0    triamcinolone (NASACORT ALLERGY 24HR) 55 MCG/ACT nasal inhaler Administer 2 Sprays into affected nostril(S) every day.       No current facility-administered medications for this visit.

## 2025-07-21 NOTE — LETTER
July 21, 2025         Patient: Geremias Conte   YOB: 1990   Date of Visit: 7/21/2025           To Whom it May Concern:    Geremias Conte was seen in my clinic on 7/21/2025. He may return to work on 7/27/2025.    If you have any questions or concerns, please don't hesitate to call.        Sincerely,           Teodoro Sellers P.A.-C.  Electronically Signed

## 2025-07-23 LAB
AMPHET CTO UR CFM-MCNC: NEGATIVE NG/ML
BARBITURATES CTO UR CFM-MCNC: NEGATIVE NG/ML
BENZODIAZ CTO UR CFM-MCNC: NEGATIVE NG/ML
CANNABINOIDS CTO UR CFM-MCNC: NEGATIVE NG/ML
COCAINE CTO UR CFM-MCNC: NEGATIVE NG/ML
CREAT UR-MCNC: 153.6 MG/DL (ref 20–400)
DRUG COMMENT 753798: NORMAL
METHADONE CTO UR CFM-MCNC: NEGATIVE NG/ML
OPIATES CTO UR CFM-MCNC: NEGATIVE NG/ML
PCP CTO UR CFM-MCNC: NEGATIVE NG/ML
PROPOXYPH CTO UR CFM-MCNC: NEGATIVE NG/ML